# Patient Record
Sex: FEMALE | Race: WHITE | Employment: FULL TIME | URBAN - METROPOLITAN AREA
[De-identification: names, ages, dates, MRNs, and addresses within clinical notes are randomized per-mention and may not be internally consistent; named-entity substitution may affect disease eponyms.]

---

## 2017-02-23 ENCOUNTER — TELEPHONE (OUTPATIENT)
Dept: INTERNAL MEDICINE CLINIC | Facility: CLINIC | Age: 42
End: 2017-02-23

## 2017-02-24 NOTE — TELEPHONE ENCOUNTER
Pt states that she is going to be traveling this year to [facility] and [facility] and she has noticed that every time she has traveled in the past her feet get very swollen to the point where she cant put on her shoes.  Pt is asking if there is something she can t

## 2017-02-28 ENCOUNTER — OFFICE VISIT (OUTPATIENT)
Dept: INTERNAL MEDICINE CLINIC | Facility: CLINIC | Age: 42
End: 2017-02-28

## 2017-02-28 VITALS
SYSTOLIC BLOOD PRESSURE: 103 MMHG | DIASTOLIC BLOOD PRESSURE: 69 MMHG | BODY MASS INDEX: 35.55 KG/M2 | TEMPERATURE: 99 F | HEIGHT: 69 IN | HEART RATE: 75 BPM | WEIGHT: 240 LBS

## 2017-02-28 DIAGNOSIS — Z71.85 IMMUNIZATION COUNSELING: Primary | ICD-10-CM

## 2017-02-28 PROCEDURE — 99213 OFFICE O/P EST LOW 20 MIN: CPT | Performed by: INTERNAL MEDICINE

## 2017-02-28 PROCEDURE — 99212 OFFICE O/P EST SF 10 MIN: CPT | Performed by: INTERNAL MEDICINE

## 2017-02-28 NOTE — PROGRESS NOTES
HPI:    Patient ID: Lakeshia Alvarez is a 36year old female. HPI She is here today , has question regarding leg swelling . She is traveling to [place] and [place] , but she has question regarding leg swelling . According to her lately when ever she travels (ZITHROMAX) 250 MG Oral Tab Take two tablets by mouth today, then one daily. Take with food and eat some yogurt after Disp: 6 tablet Rfl: 0   Montelukast Sodium (SINGULAIR) 10 MG Oral Tab Take 1 tablet (10 mg total) by mouth nightly.  Disp: 30 tablet Rfl: 2 Eyes: Conjunctivae and EOM are normal. Pupils are equal, round, and reactive to light. Right eye exhibits no discharge. Left eye exhibits no discharge. No scleral icterus. Neck: Normal range of motion. Neck supple. No JVD present.  No tracheal tendernes Referrals:  None        [id number]

## 2017-02-28 NOTE — PATIENT INSTRUCTIONS
immunization counseling  (primary encounter diagnosis) will refer to occupational health for travel vaccination  Leg swelling during flight- advised to walk every one hour, in seat exercise to help contraction of calf muscles , elastic stocking can help to

## 2017-04-18 ENCOUNTER — OFFICE VISIT (OUTPATIENT)
Dept: INTERNAL MEDICINE CLINIC | Facility: CLINIC | Age: 42
End: 2017-04-18

## 2017-04-18 ENCOUNTER — TELEPHONE (OUTPATIENT)
Dept: INTERNAL MEDICINE CLINIC | Facility: CLINIC | Age: 42
End: 2017-04-18

## 2017-04-18 VITALS
TEMPERATURE: 99 F | DIASTOLIC BLOOD PRESSURE: 60 MMHG | HEIGHT: 69 IN | OXYGEN SATURATION: 94 % | WEIGHT: 243.81 LBS | HEART RATE: 80 BPM | SYSTOLIC BLOOD PRESSURE: 92 MMHG | BODY MASS INDEX: 36.11 KG/M2

## 2017-04-18 DIAGNOSIS — R63.5 WEIGHT GAIN: ICD-10-CM

## 2017-04-18 DIAGNOSIS — Z12.39 BREAST CANCER SCREENING: ICD-10-CM

## 2017-04-18 DIAGNOSIS — H53.8 BLURRING OF VISION: ICD-10-CM

## 2017-04-18 DIAGNOSIS — Z23 NEED FOR VACCINATION: ICD-10-CM

## 2017-04-18 DIAGNOSIS — R82.90 ABNORMAL URINE: ICD-10-CM

## 2017-04-18 DIAGNOSIS — R21 RASH: ICD-10-CM

## 2017-04-18 DIAGNOSIS — Z00.00 ROUTINE MEDICAL EXAM: Primary | ICD-10-CM

## 2017-04-18 PROCEDURE — 90471 IMMUNIZATION ADMIN: CPT | Performed by: INTERNAL MEDICINE

## 2017-04-18 PROCEDURE — 81003 URINALYSIS AUTO W/O SCOPE: CPT | Performed by: INTERNAL MEDICINE

## 2017-04-18 PROCEDURE — 99396 PREV VISIT EST AGE 40-64: CPT | Performed by: INTERNAL MEDICINE

## 2017-04-18 PROCEDURE — 90715 TDAP VACCINE 7 YRS/> IM: CPT | Performed by: INTERNAL MEDICINE

## 2017-04-18 PROCEDURE — 36415 COLL VENOUS BLD VENIPUNCTURE: CPT | Performed by: INTERNAL MEDICINE

## 2017-04-18 RX ORDER — CLOTRIMAZOLE AND BETAMETHASONE DIPROPIONATE 10; .64 MG/G; MG/G
CREAM TOPICAL
Qty: 60 G | Refills: 0 | Status: SHIPPED | OUTPATIENT
Start: 2017-04-18 | End: 2018-07-23

## 2017-04-19 DIAGNOSIS — E78.00 ELEVATED CHOLESTEROL: Primary | ICD-10-CM

## 2017-04-19 RX ORDER — TRIAMTERENE AND HYDROCHLOROTHIAZIDE 37.5; 25 MG/1; MG/1
1 CAPSULE ORAL EVERY MORNING
Qty: 20 CAPSULE | Refills: 0 | Status: SHIPPED | OUTPATIENT
Start: 2017-04-19 | End: 2018-07-23

## 2017-04-19 NOTE — PROGRESS NOTES
Quick Note:    CBC Normal (white blood cells and red blood cells and platelets), except slight anemia. Would add on ferritin, TIBC, iron, b12. To add on. Urine is clear. Lipid (choilesterol) is worse, careful with diet.  Stay away from red meat, shellfish

## 2017-04-19 NOTE — PROGRESS NOTES
HPI:    Patient ID: Trung Cadet is a 36year old female. Rash  This is a new problem. The current episode started 1 to 4 weeks ago (When returned from vacation. Wearing bathing suit and wet skin. Simiilar to fungal in the past. ).  The problem has be CREATSERUM 0.78 12/04/2015   CREATSERUM 0.85 10/24/2014       General Health        Domestic Abuse: No     Depression Screening (PHQ-2/PHQ-9): Over the LAST 2 WEEKS   Little interest or pleasure in doing things (over the last two weeks)?: Not at all    Fee Tuberculosis Screen if high risk No components found for: PPDINDURAT      ALLERGIES:   No Known Allergies    CURRENT MEDICATIONS:     Current Outpatient Prescriptions:  clotrimazole-betamethasone 1-0.05 % External Cream Q12hrs. For 7 days.  Disp: 60 g Rfl: Cardiovascular: Negative for chest pain, palpitations and leg swelling. Gastrointestinal: Negative for nausea, vomiting, abdominal pain, diarrhea, constipation, blood in stool, abdominal distention, anal bleeding, rectal pain and anorexia.    Endocrine: N BP 92/60 mmHg  Pulse 80  Temp(Src) 98.8 °F (37.1 °C) (Tympanic)  Ht 5' 9\" (1.753 m)  Wt 243 lb 12.8 oz (110.587 kg)  BMI 35.99 kg/m2  SpO2 94%  LMP 03/22/2017 (Exact Date)  Breastfeeding? No   Patient's last menstrual period was 03/22/2017 (exact date). Mouth/Throat: Uvula is midline, oropharynx is clear and moist and mucous membranes are normal. Mucous membranes are not pale, not dry and not cyanotic. She does not have dentures. No oral lesions. No trismus in the jaw.  No dental abscesses, uvula swelling Pulmonary/Chest: Effort normal and breath sounds normal. No accessory muscle usage or stridor. No apnea, no tachypnea and no bradypnea. She is not intubated. No respiratory distress. She has no decreased breath sounds. She has no wheezes.  She has no rhonch Skin: Skin is warm and dry. No rash noted. She is not diaphoretic. No erythema. No pallor. Psychiatric: She has a normal mood and affect.  Her speech is normal and behavior is normal. Judgment and thought content normal. Cognition and memory are normal. -     clotrimazole-betamethasone 1-0.05 % External Cream; Q12hrs. For 7 days. The patient indicates understanding of these issues and agrees to the plan.   Diet counseling perfomed  Exercise counseling perfomed    [id number]

## 2017-04-19 NOTE — PATIENT INSTRUCTIONS
ASSESSMENT/PLAN:   Routine medical exam  (primary encounter diagnosis) Check urine. Check blood. Blurring of vision F/U eye MD.     Weight gain Check blood first.     Breast cancer screening Check mammogram. Continue self breast exam every month.      R

## 2017-04-20 DIAGNOSIS — D50.9 IRON DEFICIENCY ANEMIA, UNSPECIFIED IRON DEFICIENCY ANEMIA TYPE: Primary | ICD-10-CM

## 2017-11-03 ENCOUNTER — TELEPHONE (OUTPATIENT)
Dept: OPHTHALMOLOGY | Facility: CLINIC | Age: 42
End: 2017-11-03

## 2017-11-03 NOTE — TELEPHONE ENCOUNTER
Pt. States that she is having blurred vision, which is getting worse and can't focus, so she is requesting to schedule an appt. Sooner then 1st avail. , for NP / EE.

## 2017-11-03 NOTE — TELEPHONE ENCOUNTER
Spoke to pt and states that she has been having blurred vision at near over the past few months; getting worse. Pt does not wear any glasses and has not tried OTC readers year. She is not sure what power to get OTC.  Pt would like to come in for a dilated e

## 2017-11-07 ENCOUNTER — HOSPITAL ENCOUNTER (OUTPATIENT)
Dept: MAMMOGRAPHY | Age: 42
Discharge: HOME | End: 2017-11-07
Attending: INTERNAL MEDICINE
Payer: COMMERCIAL

## 2017-11-07 DIAGNOSIS — Z12.39 BREAST CANCER SCREENING: ICD-10-CM

## 2017-11-07 PROCEDURE — 77067 SCR MAMMO BI INCL CAD: CPT | Performed by: INTERNAL MEDICINE

## 2017-11-16 ENCOUNTER — APPOINTMENT (OUTPATIENT)
Dept: LAB | Facility: HOSPITAL | Age: 42
End: 2017-11-16
Attending: INTERNAL MEDICINE
Payer: COMMERCIAL

## 2017-11-16 ENCOUNTER — OFFICE VISIT (OUTPATIENT)
Dept: OPHTHALMOLOGY | Facility: CLINIC | Age: 42
End: 2017-11-16

## 2017-11-16 DIAGNOSIS — H43.393 FLOATER, VITREOUS, BILATERAL: ICD-10-CM

## 2017-11-16 DIAGNOSIS — Z98.890 HX OF LASIK: ICD-10-CM

## 2017-11-16 DIAGNOSIS — H52.203 MYOPIA OF BOTH EYES WITH ASTIGMATISM AND PRESBYOPIA: ICD-10-CM

## 2017-11-16 DIAGNOSIS — E78.00 ELEVATED CHOLESTEROL: ICD-10-CM

## 2017-11-16 DIAGNOSIS — H52.4 MYOPIA OF BOTH EYES WITH ASTIGMATISM AND PRESBYOPIA: ICD-10-CM

## 2017-11-16 DIAGNOSIS — H02.886 MEIBOMIAN GLAND DYSFUNCTION (MGD) OF BOTH EYES: Primary | ICD-10-CM

## 2017-11-16 DIAGNOSIS — R82.90 ABNORMAL URINE: ICD-10-CM

## 2017-11-16 DIAGNOSIS — H52.13 MYOPIA OF BOTH EYES WITH ASTIGMATISM AND PRESBYOPIA: ICD-10-CM

## 2017-11-16 DIAGNOSIS — D50.9 IRON DEFICIENCY ANEMIA, UNSPECIFIED IRON DEFICIENCY ANEMIA TYPE: ICD-10-CM

## 2017-11-16 DIAGNOSIS — H02.883 MEIBOMIAN GLAND DYSFUNCTION (MGD) OF BOTH EYES: Primary | ICD-10-CM

## 2017-11-16 PROCEDURE — 36415 COLL VENOUS BLD VENIPUNCTURE: CPT

## 2017-11-16 PROCEDURE — 80061 LIPID PANEL: CPT

## 2017-11-16 PROCEDURE — 84466 ASSAY OF TRANSFERRIN: CPT

## 2017-11-16 PROCEDURE — 81001 URINALYSIS AUTO W/SCOPE: CPT

## 2017-11-16 PROCEDURE — 82728 ASSAY OF FERRITIN: CPT

## 2017-11-16 PROCEDURE — 83540 ASSAY OF IRON: CPT

## 2017-11-16 PROCEDURE — 99212 OFFICE O/P EST SF 10 MIN: CPT | Performed by: OPHTHALMOLOGY

## 2017-11-16 PROCEDURE — 92015 DETERMINE REFRACTIVE STATE: CPT | Performed by: OPHTHALMOLOGY

## 2017-11-16 PROCEDURE — 99243 OFF/OP CNSLTJ NEW/EST LOW 30: CPT | Performed by: OPHTHALMOLOGY

## 2017-11-16 NOTE — PATIENT INSTRUCTIONS
Myopia of both eyes with astigmatism and presbyopia  Recommend glasses; patient can try progressives, or she will just try +1.50 over the counter for reading.      Meibomian gland dysfunction (MGD) of both eyes  Patient was instructed to use warm compresses

## 2017-11-16 NOTE — PROGRESS NOTES
Laurie Blind is a 36year old female.     HPI:     HPI     Consult    Additional comments: Consult per Dr. Chasity Reed for blurred vision           Comments   Pt is here for a complete exam.  Pt complains of eyes feeling tired and  blurred vision in both eyes Base Eye Exam     Visual Acuity (Snellen - Linear)       Right Left    Dist sc 20/20 20/20 -1    Near sc 20/20 20/30          Tonometry (Applanation, 8:52 AM)       Right Left    Pressure 15 16          Pupils       Pupils    Right PERRL    Left PERRL gland dysfunction (MGD) of both eyes  Patient was instructed to use warm compresses to the eyelids twice a day everyday.     Instructions for warm compress use:   Patient should place wash compresses on both eyelids for 5 minutes every morning and every nig

## 2017-11-16 NOTE — ASSESSMENT & PLAN NOTE
Recommend glasses; patient can try progressives, or she will just try +1.50 over the counter for reading.

## 2017-11-18 PROBLEM — E78.00 HIGH CHOLESTEROL: Status: ACTIVE | Noted: 2017-11-18

## 2017-11-18 PROBLEM — D50.0 IRON DEFICIENCY ANEMIA DUE TO CHRONIC BLOOD LOSS: Status: ACTIVE | Noted: 2017-11-18

## 2018-03-26 ENCOUNTER — OFFICE VISIT (OUTPATIENT)
Dept: INTERNAL MEDICINE CLINIC | Facility: CLINIC | Age: 43
End: 2018-03-26

## 2018-03-26 VITALS
HEART RATE: 68 BPM | SYSTOLIC BLOOD PRESSURE: 123 MMHG | HEIGHT: 69 IN | BODY MASS INDEX: 34.36 KG/M2 | TEMPERATURE: 98 F | OXYGEN SATURATION: 99 % | WEIGHT: 232 LBS | DIASTOLIC BLOOD PRESSURE: 57 MMHG

## 2018-03-26 DIAGNOSIS — E78.00 HIGH CHOLESTEROL: ICD-10-CM

## 2018-03-26 DIAGNOSIS — N93.8 DUB (DYSFUNCTIONAL UTERINE BLEEDING): ICD-10-CM

## 2018-03-26 DIAGNOSIS — Z12.4 PAP SMEAR FOR CERVICAL CANCER SCREENING: Primary | ICD-10-CM

## 2018-03-26 DIAGNOSIS — D50.0 IRON DEFICIENCY ANEMIA DUE TO CHRONIC BLOOD LOSS: ICD-10-CM

## 2018-03-26 DIAGNOSIS — IMO0002 MASS: ICD-10-CM

## 2018-03-26 DIAGNOSIS — K64.8 OTHER HEMORRHOIDS: ICD-10-CM

## 2018-03-26 PROCEDURE — 99212 OFFICE O/P EST SF 10 MIN: CPT | Performed by: INTERNAL MEDICINE

## 2018-03-26 PROCEDURE — 99396 PREV VISIT EST AGE 40-64: CPT | Performed by: INTERNAL MEDICINE

## 2018-03-26 PROCEDURE — 99214 OFFICE O/P EST MOD 30 MIN: CPT | Performed by: INTERNAL MEDICINE

## 2018-03-26 PROCEDURE — 36415 COLL VENOUS BLD VENIPUNCTURE: CPT | Performed by: INTERNAL MEDICINE

## 2018-03-26 NOTE — PATIENT INSTRUCTIONS
ASSESSMENT/PLAN:   Pap smear for cervical cancer screening  (primary encounter diagnosis) Check pap and HPV. Mass R side nose. Looks benign. F/U dermatology. Dub (dysfunctional uterine bleeding) Monitor cycles. Check pap. Check blood.      High chol

## 2018-03-26 NOTE — PROGRESS NOTES
HPI:    Patient ID: Juan Pablo Chapin is a 39year old female.   Juan Pablo Chapin is a 39year old female who presents for a complete physical exam.   HPI:   Patient presents with:  Menstrual Problem: Menstrual cycle arriving too often       Wt Readings from L NL at 28 y/o. H/O hemoorhoids.        Past Surgical History:  No date: COLONOSCOPY  2012: LASIK Bilateral      Comment: does not remember 's name who did surgery  No date: TONSILLECTOMY   Family History   Problem Relation Age of Onset   • Cancer Materna The patient's primary symptoms include vaginal bleeding. The patient's pertinent negatives include no genital itching, genital lesions, genital odor, genital rash, missed menses, pelvic pain or vaginal discharge.  Primary symptoms comment: Usu. 2-18-18 to 2 Gastrointestinal: Negative for abdominal distention, abdominal pain, anal bleeding, blood in stool, constipation, diarrhea, nausea, rectal pain and vomiting. Anal itching. Especially when spicey foods. Loves spice.     Endocrine: Negative for cold in Family History   Problem Relation Age of Onset   • Cancer Maternal Grandmother    • Glaucoma Maternal Grandmother    • apnea [OTHER] Brother    • Diabetes Neg    • Macular degeneration Neg       Social History: Smoking status: Never Smoker Mouth/Throat: Uvula is midline, oropharynx is clear and moist and mucous membranes are normal. Mucous membranes are not pale, not dry and not cyanotic. She does not have dentures. No oral lesions. No trismus in the jaw.  No dental abscesses, uvula swelling Pulmonary/Chest: Effort normal and breath sounds normal. No accessory muscle usage or stridor. No apnea, no tachypnea and no bradypnea. No respiratory distress. She has no decreased breath sounds. She has no wheezes. She has no rhonchi. She has no rales.  Prasad Prophet She has no cervical adenopathy. Right cervical: No superficial cervical, no deep cervical and no posterior cervical adenopathy present. Left cervical: No superficial cervical, no deep cervical and no posterior cervical adenopathy present.

## 2018-04-02 NOTE — PROGRESS NOTES
CBC Normal (white blood cells and platelets), but red amount of red blood cells has declined and iron storage is very low. May be just from heavy cycles.   Would recommend iron 325 mg twice a day with vitamin C 500 IUs twice a day to help absorb iron and r

## 2018-04-05 ENCOUNTER — TELEPHONE (OUTPATIENT)
Dept: INTERNAL MEDICINE CLINIC | Facility: CLINIC | Age: 43
End: 2018-04-05

## 2018-04-05 NOTE — TELEPHONE ENCOUNTER
Patient would like to see you tomorrow she has a severe cough,cold and fever only is requesting to see you

## 2018-04-06 ENCOUNTER — OFFICE VISIT (OUTPATIENT)
Dept: INTERNAL MEDICINE CLINIC | Facility: CLINIC | Age: 43
End: 2018-04-06

## 2018-04-06 VITALS
OXYGEN SATURATION: 99 % | HEIGHT: 69 IN | WEIGHT: 229 LBS | HEART RATE: 80 BPM | TEMPERATURE: 98 F | BODY MASS INDEX: 33.92 KG/M2 | DIASTOLIC BLOOD PRESSURE: 56 MMHG | SYSTOLIC BLOOD PRESSURE: 90 MMHG

## 2018-04-06 DIAGNOSIS — R05.9 COUGH: ICD-10-CM

## 2018-04-06 DIAGNOSIS — D50.0 IRON DEFICIENCY ANEMIA DUE TO CHRONIC BLOOD LOSS: ICD-10-CM

## 2018-04-06 DIAGNOSIS — E78.00 HIGH CHOLESTEROL: Primary | ICD-10-CM

## 2018-04-06 PROCEDURE — 99214 OFFICE O/P EST MOD 30 MIN: CPT | Performed by: INTERNAL MEDICINE

## 2018-04-06 PROCEDURE — 99212 OFFICE O/P EST SF 10 MIN: CPT | Performed by: INTERNAL MEDICINE

## 2018-04-06 RX ORDER — AZITHROMYCIN 250 MG/1
TABLET, FILM COATED ORAL
Qty: 6 TABLET | Refills: 0 | Status: SHIPPED | OUTPATIENT
Start: 2018-04-06 | End: 2018-05-09 | Stop reason: ALTCHOICE

## 2018-04-06 RX ORDER — IPRATROPIUM BROMIDE AND ALBUTEROL SULFATE 2.5; .5 MG/3ML; MG/3ML
3 SOLUTION RESPIRATORY (INHALATION) ONCE
Status: SHIPPED | OUTPATIENT
Start: 2018-04-06

## 2018-04-06 RX ORDER — ALBUTEROL SULFATE 90 UG/1
2 AEROSOL, METERED RESPIRATORY (INHALATION) EVERY 4 HOURS PRN
Qty: 1 INHALER | Refills: 0 | Status: SHIPPED | OUTPATIENT
Start: 2018-04-06 | End: 2020-08-04

## 2018-04-06 RX ORDER — MONTELUKAST SODIUM 10 MG/1
10 TABLET ORAL NIGHTLY
Qty: 7 TABLET | Refills: 0 | Status: SHIPPED | OUTPATIENT
Start: 2018-04-06 | End: 2018-05-09

## 2018-04-06 NOTE — PATIENT INSTRUCTIONS
ASSESSMENT/PLAN:   High cholesterol  (primary encounter diagnosis) Doing good 3-18. Iron deficiency anemia due to chronic blood loss Check in 3 months. Check FOBT. F/U gyne. Dr. Julian Freeman.      CoughStart antibiotics ASAP and take as directed with food til

## 2018-04-06 NOTE — PROGRESS NOTES
HPI:    Patient ID: Opal Duenas is a 39year old female. URI    This is a new problem. The current episode started in the past 7 days. The problem has been gradually worsening.  The maximum temperature recorded prior to her arrival was 100.4 - 100.9 Eyes: Negative for photophobia, pain, discharge, redness, itching and visual disturbance. Respiratory: Positive for wheezing. Negative for apnea, cough, choking, chest tightness, shortness of breath and stridor.     Cardiovascular: Negative for chest p History:  No date: COLONOSCOPY  2012: LASIK Bilateral      Comment: does not remember 's name who did surgery  No date: TONSILLECTOMY   Family History   Problem Relation Age of Onset   • Cancer Maternal Grandmother    • Glaucoma Maternal Grandmother membranes are normal. Mucous membranes are not pale, not dry and not cyanotic. She does not have dentures. No oral lesions. No trismus in the jaw. No dental abscesses, uvula swelling or lacerations.  No oropharyngeal exudate, posterior oropharyngeal edema, to person, place, and time. Skin: Skin is warm and dry. She is not diaphoretic. Psychiatric: She has a normal mood and affect. Her behavior is normal.   Nursing note and vitals reviewed.            ASSESSMENT/PLAN:   High cholesterol  (primary encounter

## 2018-05-09 ENCOUNTER — OFFICE VISIT (OUTPATIENT)
Dept: OBGYN CLINIC | Facility: CLINIC | Age: 43
End: 2018-05-09

## 2018-05-09 VITALS
DIASTOLIC BLOOD PRESSURE: 54 MMHG | BODY MASS INDEX: 33 KG/M2 | SYSTOLIC BLOOD PRESSURE: 106 MMHG | HEART RATE: 86 BPM | WEIGHT: 225 LBS

## 2018-05-09 DIAGNOSIS — Z32.00 PREGNANCY EXAMINATION OR TEST, PREGNANCY UNCONFIRMED: ICD-10-CM

## 2018-05-09 DIAGNOSIS — N92.0 EXCESSIVE OR FREQUENT MENSTRUATION: Primary | ICD-10-CM

## 2018-05-09 PROCEDURE — 99214 OFFICE O/P EST MOD 30 MIN: CPT | Performed by: OBSTETRICS & GYNECOLOGY

## 2018-05-09 PROCEDURE — 81025 URINE PREGNANCY TEST: CPT | Performed by: OBSTETRICS & GYNECOLOGY

## 2018-05-09 NOTE — H&P
HPI:  The patient is a 76-year-old female who presents complaining of frequent menses. Patient states that since February she has been getting her period every 15 days. She states that she used to have regular monthly cycles.   However she states now sh HISTORY:  Family History   Problem Relation Age of Onset   • Cancer Maternal Grandmother    • Glaucoma Maternal Grandmother    • apnea [OTHER] Brother    • Diabetes Neg    • Macular degeneration Neg        MEDICATIONS:    Current Outpatient Prescriptions: PELVIS (TRANSVAGINAL PELVIS) (CPT=76830); Future  -     CHLAMYDIA/GONOCOCCUS, ATUL; Future  -     TRICH VAG BY ATUL;  Future  -     TRICH VAG BY ATUL  -     CHLAMYDIA/GONOCOCCUS, ATUL    Pregnancy examination or test, pregnancy unconfirmed  -     URINE PREGNANC

## 2018-07-23 ENCOUNTER — OFFICE VISIT (OUTPATIENT)
Dept: OBGYN CLINIC | Facility: CLINIC | Age: 43
End: 2018-07-23
Payer: COMMERCIAL

## 2018-07-23 VITALS
SYSTOLIC BLOOD PRESSURE: 113 MMHG | HEART RATE: 72 BPM | BODY MASS INDEX: 34 KG/M2 | WEIGHT: 227.19 LBS | DIASTOLIC BLOOD PRESSURE: 61 MMHG

## 2018-07-23 DIAGNOSIS — N92.0 MENORRHAGIA WITH REGULAR CYCLE: Primary | ICD-10-CM

## 2018-07-23 DIAGNOSIS — Z30.09 BIRTH CONTROL COUNSELING: ICD-10-CM

## 2018-07-23 PROCEDURE — 99213 OFFICE O/P EST LOW 20 MIN: CPT | Performed by: OBSTETRICS & GYNECOLOGY

## 2018-08-02 NOTE — H&P
HPI:  The patient is a 41-year-old female who presents today to discuss contraception. Patient's interested in birth control options to prevent pregnancy. I saw the patient last in May was complaining of excessive and frequent menstruation.   The plan at degeneration Neg        MEDICATIONS:    Current Outpatient Prescriptions:   •  Albuterol Sulfate HFA (PROAIR HFA) 108 (90 Base) MCG/ACT Inhalation Aero Soln, Inhale 2 puffs into the lungs every 4 (four) hours as needed for Wheezing or Shortness of Breath. ,

## 2018-08-08 ENCOUNTER — HOSPITAL ENCOUNTER (OUTPATIENT)
Dept: ULTRASOUND IMAGING | Facility: HOSPITAL | Age: 43
Discharge: HOME | End: 2018-08-08
Attending: OBSTETRICS & GYNECOLOGY
Payer: COMMERCIAL

## 2018-08-08 DIAGNOSIS — N92.0 EXCESSIVE OR FREQUENT MENSTRUATION: ICD-10-CM

## 2018-08-08 PROCEDURE — 76830 TRANSVAGINAL US NON-OB: CPT | Performed by: OBSTETRICS & GYNECOLOGY

## 2018-08-08 PROCEDURE — 76856 US EXAM PELVIC COMPLETE: CPT | Performed by: OBSTETRICS & GYNECOLOGY

## 2018-08-16 ENCOUNTER — TELEPHONE (OUTPATIENT)
Dept: OBGYN CLINIC | Facility: CLINIC | Age: 43
End: 2018-08-16

## 2018-08-16 DIAGNOSIS — Z32.00 PREGNANCY EXAMINATION OR TEST, PREGNANCY UNCONFIRMED: Primary | ICD-10-CM

## 2018-08-16 RX ORDER — MISOPROSTOL 200 UG/1
TABLET ORAL
Qty: 1 TABLET | Refills: 0 | Status: SHIPPED | OUTPATIENT
Start: 2018-08-16 | End: 2018-08-16

## 2018-08-16 RX ORDER — MISOPROSTOL 200 UG/1
TABLET ORAL
Qty: 1 TABLET | Refills: 0 | Status: SHIPPED | OUTPATIENT
Start: 2018-08-16 | End: 2019-05-01 | Stop reason: ALTCHOICE

## 2018-08-16 NOTE — TELEPHONE ENCOUNTER
PT NOTIFIED OF RESULTS AND RECS. SHE WILL COME TO [facility] FOR PREG TEST THE DAY BEFORE. PHARMACY CONFIRMED AND RX SENT. SUGGESTED TAKING 2-3 IBUPROFEN ABOUT 30-60 MINUTES PRIOR TO APPT. PT VERBALIZED UNDERSTANDING. ORDER PLACED AND RX SENT.

## 2018-08-16 NOTE — TELEPHONE ENCOUNTER
----- Message from Inna Lobo DO sent at 8/15/2018  1:14 PM CDT -----  Pelvic US normal.  Help schedule emb. Need cytotec the night before and serum HCG the day before as well.

## 2018-08-27 ENCOUNTER — TELEPHONE (OUTPATIENT)
Dept: OBGYN CLINIC | Facility: CLINIC | Age: 43
End: 2018-08-27

## 2018-08-27 NOTE — TELEPHONE ENCOUNTER
PT STATES PERIOD BEGAN A COUPLE DAYS EARLY, STARTED 8-23. HAS EMBX SCHEDULED FRI, 8-31. REASSURED PT THIS IS STILL A GOOD APPT SINCE SHE WILL BE DONE BLEEDING. REMINDED TO TAKE CYTOTEC AT BEDTIME THE NIGHT BEFORE AND DO SERUM PREG TEST THUR ANYTIME.   PT

## 2018-08-30 ENCOUNTER — APPOINTMENT (OUTPATIENT)
Dept: LAB | Facility: HOSPITAL | Age: 43
End: 2018-08-30
Attending: OBSTETRICS & GYNECOLOGY
Payer: COMMERCIAL

## 2018-08-30 DIAGNOSIS — Z32.00 PREGNANCY EXAMINATION OR TEST, PREGNANCY UNCONFIRMED: ICD-10-CM

## 2018-08-30 LAB — HCG SERPL QL: NEGATIVE

## 2018-08-30 PROCEDURE — 84703 CHORIONIC GONADOTROPIN ASSAY: CPT

## 2018-08-30 PROCEDURE — 36415 COLL VENOUS BLD VENIPUNCTURE: CPT

## 2018-08-31 ENCOUNTER — OFFICE VISIT (OUTPATIENT)
Dept: OBGYN CLINIC | Facility: CLINIC | Age: 43
End: 2018-08-31
Payer: COMMERCIAL

## 2018-08-31 VITALS
WEIGHT: 226.81 LBS | BODY MASS INDEX: 33 KG/M2 | DIASTOLIC BLOOD PRESSURE: 66 MMHG | HEART RATE: 84 BPM | SYSTOLIC BLOOD PRESSURE: 102 MMHG

## 2018-08-31 DIAGNOSIS — N92.0 MENORRHAGIA WITH REGULAR CYCLE: Primary | ICD-10-CM

## 2018-08-31 PROCEDURE — 58100 BIOPSY OF UTERUS LINING: CPT | Performed by: OBSTETRICS & GYNECOLOGY

## 2018-09-05 ENCOUNTER — TELEPHONE (OUTPATIENT)
Dept: OBGYN CLINIC | Facility: CLINIC | Age: 43
End: 2018-09-05

## 2018-09-05 NOTE — TELEPHONE ENCOUNTER
----- Message from Kim Collins DO sent at 9/5/2018  7:50 AM CDT -----  Biopsy negative. Pls notify. She needs to follow up for [facility] counseling.

## 2018-09-06 NOTE — TELEPHONE ENCOUNTER
Informed pt that Saranya Galindo stated biopsy negative. Informed pt that she needs to follow up for [facility] counseling. Pt made an appt to see Saranya Galindo.

## 2018-09-10 ENCOUNTER — OFFICE VISIT (OUTPATIENT)
Dept: OBGYN CLINIC | Facility: CLINIC | Age: 43
End: 2018-09-10
Payer: COMMERCIAL

## 2018-09-10 VITALS
WEIGHT: 229.19 LBS | SYSTOLIC BLOOD PRESSURE: 105 MMHG | BODY MASS INDEX: 34 KG/M2 | HEART RATE: 78 BPM | DIASTOLIC BLOOD PRESSURE: 68 MMHG

## 2018-09-10 DIAGNOSIS — Z12.31 SCREENING MAMMOGRAM, ENCOUNTER FOR: ICD-10-CM

## 2018-09-10 DIAGNOSIS — Z30.09 BIRTH CONTROL COUNSELING: Primary | ICD-10-CM

## 2018-09-10 PROCEDURE — 99213 OFFICE O/P EST LOW 20 MIN: CPT | Performed by: OBSTETRICS & GYNECOLOGY

## 2018-09-10 RX ORDER — ACETAMINOPHEN AND CODEINE PHOSPHATE 120; 12 MG/5ML; MG/5ML
0.35 SOLUTION ORAL DAILY
Qty: 3 PACKAGE | Refills: 0 | Status: SHIPPED | OUTPATIENT
Start: 2018-09-10 | End: 2018-10-08

## 2018-09-17 NOTE — H&P
HPI:  The patient is a 38 yo f who presents to [facility] for contraception and regulation of heavy menses. We rececently performed an EMB that was wnl. She has had a normal pelvic exam and US. She is sexually active with 1 parnter.   LPS: 3/25/18pap/hpv (apnea) Brother    • Diabetes Neg    • Macular degeneration Neg        MEDICATIONS:    Current Outpatient Medications:   •  Norethindrone (NOR-QD) 0.35 MG Oral Tab, Take 1 tablet (0.35 mg total) by mouth daily for 28 days. , Disp: 3 Package, Rfl: 0  •  reena minipill. Rx sent. To start with next cycle. Needs update mammo 1 yeare form last and f/u in 3 mo to discuss satisfaction and menses. Enforced importance of daily use, condoms during first month, and risks associated with hormones.   All questions answe

## 2018-11-05 ENCOUNTER — TELEPHONE (OUTPATIENT)
Dept: OBGYN CLINIC | Facility: CLINIC | Age: 43
End: 2018-11-05

## 2018-11-05 NOTE — TELEPHONE ENCOUNTER
C/O HAD 5 DAYS OF BLEEDING AND THEN 3 DAYS OF SPOTTING LAST PILL PACK. SAME THING HAPPENED THIS PACK BUT STARTED BACK UP WITH BRIGHT RED BLOOD YESTERDAY INSTEAD OF BROWN.   LOOKS LIKE SHE IS GOING TO GET A REGULAR BLEED AGAIN, LESS THAN 2 WEEKS FROM LAST P

## 2018-12-03 ENCOUNTER — OFFICE VISIT (OUTPATIENT)
Dept: OBGYN CLINIC | Facility: CLINIC | Age: 43
End: 2018-12-03
Payer: COMMERCIAL

## 2018-12-03 VITALS — DIASTOLIC BLOOD PRESSURE: 65 MMHG | SYSTOLIC BLOOD PRESSURE: 104 MMHG | HEART RATE: 80 BPM

## 2018-12-03 DIAGNOSIS — N92.0 MENORRHAGIA WITH REGULAR CYCLE: Primary | ICD-10-CM

## 2018-12-03 PROCEDURE — 99213 OFFICE O/P EST LOW 20 MIN: CPT | Performed by: OBSTETRICS & GYNECOLOGY

## 2018-12-03 RX ORDER — ACETAMINOPHEN AND CODEINE PHOSPHATE 120; 12 MG/5ML; MG/5ML
0.35 SOLUTION ORAL DAILY
Qty: 1 PACKAGE | Refills: 0 | Status: SHIPPED | OUTPATIENT
Start: 2018-12-03 | End: 2018-12-31

## 2018-12-08 NOTE — H&P
HPI:  The patient is a 38 yo F here to discuss satisfaction of minipill for menorrhagia treatment. Patient states that her first 2 cycles were heavy. She states that they were 18 and 24-day cycles with 5-10 days of bleeding.   She states that she is curre Social History Narrative      Not on file      FAMILY HISTORY:  Family History   Problem Relation Age of Onset   • Cancer Maternal Grandmother    • Glaucoma Maternal Grandmother    • Other (apnea) Brother    • Diabetes Neg    • Macular degeneration Neg continuing OCPs, Depakote, Nexplanon, and Mirena IUD. Risks and benefits of each approach were reviewed. After discussion patient wants to see how the rest of the cycle goes and continue one more month of minipill.   We did discuss that it can take 3-6 cy

## 2018-12-26 ENCOUNTER — TELEPHONE (OUTPATIENT)
Dept: OBGYN CLINIC | Facility: CLINIC | Age: 43
End: 2018-12-26

## 2018-12-26 RX ORDER — MISOPROSTOL 200 UG/1
200 TABLET ORAL ONCE
Qty: 1 TABLET | Refills: 0 | Status: SHIPPED | OUTPATIENT
Start: 2018-12-26 | End: 2018-12-26

## 2018-12-26 NOTE — TELEPHONE ENCOUNTER
Per JOSEPHINE's notes, pt to call for Mirena insertion. Pt is to take cytotec the night before and to a ucg in the office at the appt. Appt made on 12/28. Pharmacy verified. Rx for cytotec 200mcg sent to pharmacy.

## 2018-12-28 ENCOUNTER — TELEPHONE (OUTPATIENT)
Dept: OBGYN CLINIC | Facility: CLINIC | Age: 43
End: 2018-12-28

## 2018-12-28 RX ORDER — ACETAMINOPHEN AND CODEINE PHOSPHATE 120; 12 MG/5ML; MG/5ML
SOLUTION ORAL
Qty: 3 PACKAGE | Refills: 0 | Status: SHIPPED | OUTPATIENT
Start: 2018-12-28 | End: 2019-02-13

## 2018-12-28 NOTE — TELEPHONE ENCOUNTER
Pt was past 15 min tai and vicky could not see pt for iud insert. Pt was extremely upset and keeps insisting she needs a 90 day supply for bc then. Will not take no for an answer.

## 2018-12-28 NOTE — TELEPHONE ENCOUNTER
Okay to refill 3 month supply and she can reschedule IUD insertion if she wishes. She will need annual in February.

## 2018-12-28 NOTE — TELEPHONE ENCOUNTER
Informed pt that Saranya [address] stated okay to refill for 3 month supply and she can reschedule her IUD insertion if she wishes. Informed pt that she needs Annual in Feb.      Pt wants the rx for ocps sent. Pt will call back to reschedule IUD.   She is aware of the

## 2019-02-04 ENCOUNTER — TELEPHONE (OUTPATIENT)
Dept: OBGYN CLINIC | Facility: CLINIC | Age: 44
End: 2019-02-04

## 2019-02-04 DIAGNOSIS — Z32.00 PREGNANCY EXAMINATION OR TEST, PREGNANCY UNCONFIRMED: Primary | ICD-10-CM

## 2019-02-04 NOTE — TELEPHONE ENCOUNTER
Pt states she has been seeing [address] for this issue for some time now. Pt reports starting period on 1/21/19 and that period \"felt different and I had a lot of cramping\".  Pt reports light bleeding for the first 3 days and then heavy bleeding for the next 5

## 2019-02-06 RX ORDER — MISOPROSTOL 200 UG/1
200 TABLET ORAL ONCE
Qty: 1 TABLET | Refills: 0 | Status: SHIPPED | OUTPATIENT
Start: 2019-02-06 | End: 2019-02-06

## 2019-02-06 NOTE — TELEPHONE ENCOUNTER
She needs to be aware she can have irregular bleeding with 3-6 months with any form of BC she starts, no different than with the IUD. I can't ensure the IUD will work right away.   Since on progesterone, okay for serum HCG and placement

## 2019-02-06 NOTE — TELEPHONE ENCOUNTER
Pt informed of JOSEPHINEs recs. Pt stated \"as long as this will work, I am fine with the IUD\". Pt stated she has been bleeding since 1/21 and has been taking her pills at the same time every day and not missing any pills.  Message to JOSEPHINE--ok for pt to schedule

## 2019-02-06 NOTE — TELEPHONE ENCOUNTER
Pt informed of below. Pt stated she will go to hcg qual the day before IUD insertion after work. Order placed. Spoke with JOSEPHINE and verbal for pt to take 200mcg cytotec the night prior to insertion. RX sent.  Pt also advised to take 600mg ibuprofen 30-60 min

## 2019-02-06 NOTE — TELEPHONE ENCOUNTER
Pt informed of MAZs recs below and verbalized understanding. Pt sttaed that it is hard for her to get to [place] to do her labs and then come back the next day for the IUD insertion because she works in the city.  Pt asking if she can have blood preg test

## 2019-02-12 ENCOUNTER — APPOINTMENT (OUTPATIENT)
Dept: LAB | Facility: HOSPITAL | Age: 44
End: 2019-02-12
Attending: OBSTETRICS & GYNECOLOGY
Payer: COMMERCIAL

## 2019-02-12 ENCOUNTER — TELEPHONE (OUTPATIENT)
Dept: OBGYN CLINIC | Facility: CLINIC | Age: 44
End: 2019-02-12

## 2019-02-12 DIAGNOSIS — Z32.00 PREGNANCY EXAMINATION OR TEST, PREGNANCY UNCONFIRMED: ICD-10-CM

## 2019-02-12 LAB — HCG SERPL QL: NEGATIVE

## 2019-02-12 PROCEDURE — 84703 CHORIONIC GONADOTROPIN ASSAY: CPT

## 2019-02-12 PROCEDURE — 36415 COLL VENOUS BLD VENIPUNCTURE: CPT

## 2019-02-12 NOTE — TELEPHONE ENCOUNTER
Left message for pt to call back: needs to be reminded to get HCG done tonight in order for IUD insertion to be done tomorrow.

## 2019-02-13 ENCOUNTER — OFFICE VISIT (OUTPATIENT)
Dept: OBGYN CLINIC | Facility: CLINIC | Age: 44
End: 2019-02-13
Payer: COMMERCIAL

## 2019-02-13 ENCOUNTER — HOSPITAL ENCOUNTER (OUTPATIENT)
Dept: MAMMOGRAPHY | Age: 44
Discharge: HOME | End: 2019-02-13
Attending: OBSTETRICS & GYNECOLOGY
Payer: COMMERCIAL

## 2019-02-13 VITALS
DIASTOLIC BLOOD PRESSURE: 75 MMHG | WEIGHT: 227 LBS | SYSTOLIC BLOOD PRESSURE: 118 MMHG | HEART RATE: 85 BPM | BODY MASS INDEX: 34 KG/M2

## 2019-02-13 DIAGNOSIS — Z12.31 SCREENING MAMMOGRAM, ENCOUNTER FOR: ICD-10-CM

## 2019-02-13 DIAGNOSIS — Z30.430 ENCOUNTER FOR IUD INSERTION: Primary | ICD-10-CM

## 2019-02-13 PROCEDURE — 77067 SCR MAMMO BI INCL CAD: CPT | Performed by: OBSTETRICS & GYNECOLOGY

## 2019-02-13 PROCEDURE — 77063 BREAST TOMOSYNTHESIS BI: CPT | Performed by: OBSTETRICS & GYNECOLOGY

## 2019-02-13 PROCEDURE — 58300 INSERT INTRAUTERINE DEVICE: CPT | Performed by: OBSTETRICS & GYNECOLOGY

## 2019-02-14 NOTE — PROCEDURES
IUD Insertion     Pregnancy Results: negative from blood test   Birth control method(s) used: NorQD   LMP: 1/21/19  Consent signed. Procedure discussed with the patient in detail including indication, risks, benefits, alternatives and complications.     Pe

## 2019-02-28 ENCOUNTER — HOSPITAL ENCOUNTER (OUTPATIENT)
Dept: ULTRASOUND IMAGING | Facility: HOSPITAL | Age: 44
Discharge: HOME | End: 2019-02-28
Attending: OBSTETRICS & GYNECOLOGY
Payer: COMMERCIAL

## 2019-02-28 ENCOUNTER — HOSPITAL ENCOUNTER (OUTPATIENT)
Dept: MAMMOGRAPHY | Facility: HOSPITAL | Age: 44
Discharge: HOME | End: 2019-02-28
Attending: OBSTETRICS & GYNECOLOGY
Payer: COMMERCIAL

## 2019-02-28 DIAGNOSIS — R92.8 ABNORMAL MAMMOGRAM: ICD-10-CM

## 2019-02-28 PROCEDURE — 77061 BREAST TOMOSYNTHESIS UNI: CPT | Performed by: OBSTETRICS & GYNECOLOGY

## 2019-02-28 PROCEDURE — 76642 ULTRASOUND BREAST LIMITED: CPT | Performed by: OBSTETRICS & GYNECOLOGY

## 2019-02-28 PROCEDURE — 77065 DX MAMMO INCL CAD UNI: CPT | Performed by: OBSTETRICS & GYNECOLOGY

## 2019-02-28 NOTE — IMAGING NOTE
This nurse introduced self and role of breast coordinator. Discussed recommended breast biopsy with patient. Mrs. Heydi Rivas was recommended by Dr. Hopper Born  to have a Left Breast ultrasound guided biopsy.    Pt history discussed as below:  INDICATION DIAGNOSTIC CATEGORY 4--SUSPICIOUS       RECOMMENDATIONS:   ULTRASOUND-GUIDED CORE BIOPSY: LEFT BREAST      Dictated by (CST): Ochoa Almonte MD on 2/28/2019 at 14:07       Approved by (CST): Ochoa Almonte MD on 2/28/2019 at 17:57            Pt h preference is to hold these medications for  5days prior to biopsy. Mrs. Marylou Riggs denies taking. Reviewed US guided biopsy procedure as below and written instruction provided.   You will be lying on your back, potentially slightly toward one side, for t with comfort after the biopsy and decrease swelling. Reviewed results process with patient and shared that pathology results will be available within 2-3 business days of their biopsy.   Discussed results will be communicated by their ordering physician un

## 2019-03-04 ENCOUNTER — HOSPITAL ENCOUNTER (OUTPATIENT)
Dept: ULTRASOUND IMAGING | Facility: HOSPITAL | Age: 44
Discharge: HOME | End: 2019-03-04
Attending: OBSTETRICS & GYNECOLOGY
Payer: COMMERCIAL

## 2019-03-04 ENCOUNTER — HOSPITAL ENCOUNTER (OUTPATIENT)
Dept: MAMMOGRAPHY | Facility: HOSPITAL | Age: 44
Discharge: HOME | End: 2019-03-04
Attending: OBSTETRICS & GYNECOLOGY
Payer: COMMERCIAL

## 2019-03-04 ENCOUNTER — TELEPHONE (OUTPATIENT)
Dept: OBGYN CLINIC | Facility: CLINIC | Age: 44
End: 2019-03-04

## 2019-03-04 VITALS
HEART RATE: 81 BPM | SYSTOLIC BLOOD PRESSURE: 107 MMHG | WEIGHT: 195 LBS | BODY MASS INDEX: 28.88 KG/M2 | HEIGHT: 69 IN | DIASTOLIC BLOOD PRESSURE: 58 MMHG

## 2019-03-04 DIAGNOSIS — N63.20 LEFT BREAST MASS: ICD-10-CM

## 2019-03-04 DIAGNOSIS — N63.20 BREAST MASS, LEFT: ICD-10-CM

## 2019-03-04 DIAGNOSIS — N63.0 BREAST MASS SEEN ON MAMMOGRAM: Primary | ICD-10-CM

## 2019-03-04 NOTE — TELEPHONE ENCOUNTER
Ting Almendarez from the breast center states pt was in today for US guided bx but Dr Amena Argueta could not visualize area to determine if bx is needed. Dr Amena Argueta is recommending a left breast MRI to be done this week.  Pt is scheduled on Wed 3/6/19 (to hold slot) for M

## 2019-03-04 NOTE — IMAGING NOTE
1124:  Contacted Dr. Zacarias [place] office to request an order for MRI of the left breast.  Per RN Merlyn Del Valle, Dr. Taryn Bruce provided MRI order. Awaiting insurance authorization.   1206: Mrs. Xu Palomo contacted, requested that she contact the Radiology scheduler to com

## 2019-03-04 NOTE — TELEPHONE ENCOUNTER
[facility] breast care coordinator states pt is there now for a biopsy, needs modality of order changed.  Please advise

## 2019-03-04 NOTE — IMAGING NOTE
80: Mrs. Laverne Barbour arrived to ultrasound room #4 . Medications and allergies reviewed. Procedure explained and questions answered to  Ana Laura's satisfaction.     Hx taken: Left Breast Mass    0912:  Order verified and written consent obtained     09

## 2019-03-06 ENCOUNTER — HOSPITAL ENCOUNTER (OUTPATIENT)
Dept: MRI IMAGING | Facility: HOSPITAL | Age: 44
Discharge: HOME | End: 2019-03-06
Attending: OBSTETRICS & GYNECOLOGY
Payer: COMMERCIAL

## 2019-03-06 DIAGNOSIS — N63.0 BREAST MASS SEEN ON MAMMOGRAM: ICD-10-CM

## 2019-03-06 PROCEDURE — 77048 MRI BREAST C-+ W/CAD UNI: CPT | Performed by: OBSTETRICS & GYNECOLOGY

## 2019-03-06 PROCEDURE — A9575 INJ GADOTERATE MEGLUMI 0.1ML: HCPCS | Performed by: OBSTETRICS & GYNECOLOGY

## 2019-03-12 ENCOUNTER — TELEPHONE (OUTPATIENT)
Dept: OBGYN CLINIC | Facility: CLINIC | Age: 44
End: 2019-03-12

## 2019-03-12 DIAGNOSIS — R92.8 ABNORMAL MAMMOGRAM OF LEFT BREAST: Primary | ICD-10-CM

## 2019-03-12 NOTE — IMAGING NOTE
This rn contacted  Dr Mcwilliams office spoke with Eleanor Dumont. Informed Rut Kowalski that following recommendations by Dr García Hahn was being recommended for Monika Capone Stereotactic biopsy under tomosynthesis.  Rut Kowalski was informed that we do not do stereotactic biopsy u

## 2019-03-12 NOTE — TELEPHONE ENCOUNTER
CANDELARIA FROM BREAST CENTER CALLED TO SAY THAT PT NEEDS A STEREOTACTIC EMMIE BREAST BIOPSY BUT [facility]RAC[facility] IS UNABLE TO DO THIS. PT WILL NEED TO HAVE THIS DONE AT [facility].   WE CAN REFER PT TO CANDELARIA [phone] FOR DI

## 2019-03-12 NOTE — PROGRESS NOTES
Prob Benign appearing findings on mammogram but pt needs to have stereotactic biopsy of her left breast- please call pt and make sure that is scheduled, radiology recommends repeat bilateral MRI of breasts in 6 months,please order and call pt

## 2019-03-12 NOTE — TELEPHONE ENCOUNTER
----- Message from Raheem Bey MD sent at 3/11/2019  8:11 PM CDT -----  Prob Benign appearing findings on mammogram but pt needs to have stereotactic biopsy of her left breast- please call pt and make sure that is scheduled, radiology recommends repeat

## 2019-03-12 NOTE — TELEPHONE ENCOUNTER
PT NOTIFIED OF RESULT AND RECS AND VERBALIZED UNDERSTANDING. PT WILL CALL CANDELARIA SHEPHERD TO MAKE ARRANGEMENTS FOR OUTSIDE BIOPSY.

## 2019-03-13 ENCOUNTER — NURSE NAVIGATOR ENCOUNTER (OUTPATIENT)
Dept: MAMMOGRAPHY | Facility: HOSPITAL | Age: 44
End: 2019-03-13

## 2019-03-13 ENCOUNTER — TELEPHONE (OUTPATIENT)
Dept: MAMMOGRAPHY | Facility: HOSPITAL | Age: 44
End: 2019-03-13

## 2019-03-13 NOTE — TELEPHONE ENCOUNTER
RECEIVED CALL FROM CANDELARIA AT BREAST CENTER. THEY JUST FOUND OUT 2 HOURS AGO THAT [facility] WILL HAVE A EMMIE MACHINE TO DO TOMOSYNTHESIS BIOPSIES AS OF 4-2-19. CANDELARIA ALREADY SPOKE WITH THE PT AND SHE WANTS TO WAIT AND HAVE BIOPSY DONE AT [facility]. ORDER PLACED.   DON

## 2019-03-13 NOTE — IMAGING NOTE
This rn [facility] regarding need for stereotactic bx with ernie needed. We were  Just informed today that we have  a start date April 2 when we are having a new stereotactic tomosynthesis  machine placed at [facility].  Spoke with Dr Daria Mason OF BREAST CANCER.   A CLINICALLY SUSPICIOUS PALPABLE LUMP SHOULD BE BIOPSI     Dictated by (CST): Joshua Castanon MD on 3/07/2019 at 14:15       Approved by (CST): Joshua Castanon MD on 3/07/2019 at 15:58              Pt history discussed as below:  P located upon future breast imaging. After the clip is placed, the  Technologist  will place a dressing on the biopsy site. Additional mammography films will then be taken to assure correct placement of the marker.     Educated the patient they will be som

## 2019-03-13 NOTE — TELEPHONE ENCOUNTER
Attempted to reach Monika Capone to assist with information regarding obtaining stereotactic biopsy under ernie at [facility] or [facility] our facilty does not have this machine at this time.  Message left to return call for assistance Karla hobson

## 2019-04-08 ENCOUNTER — TELEPHONE (OUTPATIENT)
Dept: OBGYN CLINIC | Facility: CLINIC | Age: 44
End: 2019-04-08

## 2019-04-08 NOTE — TELEPHONE ENCOUNTER
Informed pt to keep appt for 4/18/19. Bleeding precautions reviewed with pt again. Pt verbalized understanding.

## 2019-04-08 NOTE — TELEPHONE ENCOUNTER
Pt had Mirena inserted on 2/13/19. Pt states she had spotting that started 1 week after having IUD inserted. Pt states the spotting lasted for 1 month. Pt states around 3/27/19 the bleeding started again with cramping.  Pt states the bleeding stopped about

## 2019-04-09 ENCOUNTER — HOSPITAL ENCOUNTER (OUTPATIENT)
Dept: MAMMOGRAPHY | Facility: HOSPITAL | Age: 44
Discharge: HOME | End: 2019-04-09
Attending: OBSTETRICS & GYNECOLOGY
Payer: COMMERCIAL

## 2019-04-16 ENCOUNTER — HOSPITAL ENCOUNTER (OUTPATIENT)
Dept: MAMMOGRAPHY | Facility: HOSPITAL | Age: 44
Discharge: HOME | End: 2019-04-16
Attending: OBSTETRICS & GYNECOLOGY
Payer: COMMERCIAL

## 2019-04-18 ENCOUNTER — OFFICE VISIT (OUTPATIENT)
Dept: OBGYN CLINIC | Facility: CLINIC | Age: 44
End: 2019-04-18
Payer: COMMERCIAL

## 2019-04-18 VITALS
DIASTOLIC BLOOD PRESSURE: 72 MMHG | BODY MASS INDEX: 34 KG/M2 | WEIGHT: 228.19 LBS | HEART RATE: 74 BPM | SYSTOLIC BLOOD PRESSURE: 115 MMHG

## 2019-04-18 DIAGNOSIS — Z30.431 IUD CHECK UP: Primary | ICD-10-CM

## 2019-04-18 PROCEDURE — 99213 OFFICE O/P EST LOW 20 MIN: CPT | Performed by: OBSTETRICS & GYNECOLOGY

## 2019-04-19 ENCOUNTER — HOSPITAL ENCOUNTER (OUTPATIENT)
Dept: MAMMOGRAPHY | Facility: HOSPITAL | Age: 44
Discharge: HOME | End: 2019-04-19
Attending: OBSTETRICS & GYNECOLOGY
Payer: COMMERCIAL

## 2019-04-19 VITALS
BODY MASS INDEX: 31.1 KG/M2 | HEIGHT: 69 IN | SYSTOLIC BLOOD PRESSURE: 123 MMHG | DIASTOLIC BLOOD PRESSURE: 52 MMHG | HEART RATE: 76 BPM | WEIGHT: 210 LBS

## 2019-04-19 DIAGNOSIS — R92.8 ABNORMAL MAMMOGRAM OF LEFT BREAST: ICD-10-CM

## 2019-04-19 PROCEDURE — 77065 DX MAMMO INCL CAD UNI: CPT | Performed by: OBSTETRICS & GYNECOLOGY

## 2019-04-19 PROCEDURE — 88342 IMHCHEM/IMCYTCHM 1ST ANTB: CPT | Performed by: OBSTETRICS & GYNECOLOGY

## 2019-04-19 PROCEDURE — 88305 TISSUE EXAM BY PATHOLOGIST: CPT | Performed by: OBSTETRICS & GYNECOLOGY

## 2019-04-19 PROCEDURE — 88360 TUMOR IMMUNOHISTOCHEM/MANUAL: CPT | Performed by: OBSTETRICS & GYNECOLOGY

## 2019-04-19 PROCEDURE — 19081 BX BREAST 1ST LESION STRTCTC: CPT | Performed by: OBSTETRICS & GYNECOLOGY

## 2019-04-19 NOTE — IMAGING NOTE
History taken:  Other abnormal and inconclusive findings on diagnostic imaging of breast.  38year-old female recalled from screening mammogram for questionable area of architectural distortion seen only on the CC view with tomosynthesis.     Dr Tamie Mccray he

## 2019-04-19 NOTE — PROCEDURES
[facility]  Procedure Note    Hugojohn Santororossana Patient Status:  Outpatient    1975 MRN [id number]   Location [address] [facility] Attending Silvia Lombardi MD   Hosp Day # 0 PCP Carlos Sanz.  Charo Hassan MD     Procedure: L

## 2019-04-22 ENCOUNTER — TELEPHONE (OUTPATIENT)
Dept: OBGYN CLINIC | Facility: CLINIC | Age: 44
End: 2019-04-22

## 2019-04-22 PROBLEM — C50.912 INFILTRATING DUCTAL CARCINOMA OF LEFT BREAST (HCC): Status: ACTIVE | Noted: 2019-04-22

## 2019-04-22 NOTE — TELEPHONE ENCOUNTER
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 Message also forwarded to [address] [place] [address]  as fyi please see message below.

## 2019-04-22 NOTE — TELEPHONE ENCOUNTER
Just notified by Dr Martin Leon in path dept that pt has infiltrating ductal breast cancer. Please call breast center to make sure that they are going to notify this patient.   I have not seen her in over 3 years

## 2019-04-22 NOTE — TELEPHONE ENCOUNTER
Message forwarded to [employer] to please call pt with results per CAP. Please refer pt to surgeon Dr Jovanny Meléndez and oncologist Dr Sylvia Conner. Thank you. (Received call from [employer] stating she will need an order to call pt to notify of result. Also needs to know which surgeon and oncologist our doctor is referring pt to.  States Dr Vale Becerra is out of clinic until mid-June.)

## 2019-04-23 ENCOUNTER — TELEPHONE (OUTPATIENT)
Dept: HEMATOLOGY/ONCOLOGY | Facility: HOSPITAL | Age: 44
End: 2019-04-23

## 2019-04-23 NOTE — TELEPHONE ENCOUNTER
Phoned patient s/p left breast tomosynthesis guided biopsy. Patient denies breast complaints at this time.   Shared with patient that Dr. Amber Cid has referred her to the Breast Biopsy Result Clinic to discuss results with the Radiologist.  Discussed with neptali

## 2019-04-23 NOTE — TELEPHONE ENCOUNTER
Phoned patient for scheduled appointment with Breast Biopsy Result Clinic. Patient spoke with Dr. Julianna Santamaria, Radiologist, who provided patient with the malignant pathology results. Patient tearful. Emotional support provided to patient.   Shared with p

## 2019-04-24 ENCOUNTER — OFFICE VISIT (OUTPATIENT)
Dept: SURGERY | Facility: CLINIC | Age: 44
End: 2019-04-24
Payer: COMMERCIAL

## 2019-04-24 ENCOUNTER — TELEPHONE (OUTPATIENT)
Dept: OBGYN CLINIC | Facility: CLINIC | Age: 44
End: 2019-04-24

## 2019-04-24 VITALS — BODY MASS INDEX: 32.58 KG/M2 | HEIGHT: 69 IN | WEIGHT: 220 LBS

## 2019-04-24 DIAGNOSIS — C50.412 CARCINOMA OF UPPER-OUTER QUADRANT OF LEFT BREAST IN FEMALE, ESTROGEN RECEPTOR POSITIVE (HCC): Primary | ICD-10-CM

## 2019-04-24 DIAGNOSIS — Z17.0 CARCINOMA OF UPPER-OUTER QUADRANT OF LEFT BREAST IN FEMALE, ESTROGEN RECEPTOR POSITIVE (HCC): Primary | ICD-10-CM

## 2019-04-24 PROCEDURE — 99244 OFF/OP CNSLTJ NEW/EST MOD 40: CPT | Performed by: SURGERY

## 2019-04-24 PROCEDURE — 99212 OFFICE O/P EST SF 10 MIN: CPT | Performed by: SURGERY

## 2019-04-24 NOTE — TELEPHONE ENCOUNTER
PT IS FOLLOWING UP ON THE IUD INSERTION, PT NEEDS TO REMOVE IUD DUE TO RESULTS OF MAMMOGRAM. PLS CALL PT TO FOLLOW UP AND ADVISE.

## 2019-04-25 ENCOUNTER — TELEPHONE (OUTPATIENT)
Dept: HEMATOLOGY/ONCOLOGY | Facility: HOSPITAL | Age: 44
End: 2019-04-25

## 2019-04-25 ENCOUNTER — TELEPHONE (OUTPATIENT)
Dept: OBGYN CLINIC | Facility: CLINIC | Age: 44
End: 2019-04-25

## 2019-04-25 NOTE — TELEPHONE ENCOUNTER
PT NEWLY DIAGNOSED WITH BREAST CANCER SO WILL NEED IUD REMOVED. PT HAS MULTIPLE APPTS NEXT WEEK ON MON, WED AND THUR. LINA WAS HOPING WE COULD FIND AN APPT WITH JOSEPHINE ON THE DAYS PT WILL BE IN THIS AREA SINCE SHE DRIVES IN FROM THE [facility].

## 2019-04-25 NOTE — TELEPHONE ENCOUNTER
Phoned patient to discuss left breast MRI biopsy recommendation and scheduling. Appointment scheduled for Thursday 5/2/19. Instructed patient to arrive at 6:30am to [facility].    Patient reports taking the following medications and over-the-counter on the biopsy site. Additional mammography films will then be taken to assure correct placement of the marker.     Educated patient to wear no deodorant, lotions, powders, or perfumes on the  day of exam.  Educated the patient they will be awake during thi

## 2019-04-25 NOTE — TELEPHONE ENCOUNTER
CALLED AND SPOKE WITH PT ABOUT POSSIBLE APPTS. SHE DECIDED TO TAKE APPT AT [facility] WITH JOSEPHINE ON 5-1 AT 4:10PM.  ENCOURAGED TO CALL BACK WITH ANY QUESTIONS OR CONCERNS.

## 2019-04-25 NOTE — TELEPHONE ENCOUNTER
Patient returned call. Patient reports her appointment with Dr. Farida Evans went well. Patient is eager to proceed with next steps in her care. Discussed scheduling appointments with Genetic Counselor, Medical Oncology, as well as left breast MRI biopsy.

## 2019-04-26 NOTE — H&P
History and Physical      Tarah Rawls is a 38year old female. HPI   Patient presents with:  Breast Cancer: Pt referred by Dr. Ila Cuba regarding left breast cancer. Pt states she went for annual mammogran, u/s then left breast bx.   Pt states she can Substance and Sexual Activity      Alcohol use:  Yes        Alcohol/week: 0.5 oz        Types: 1 Standard drinks or equivalent per week        Comment: OCC      Drug use: No      Sexual activity: Yes        Partners: Male    Family History   Problem Relatio 4/19/19: gd 2 IDC, at least 2mm, +ER and NJ, neg HER, 15% Ki67  MRI BX L 7:00 TBD      ASSESSMENT/PLAN   Assessment   Carcinoma of upper-outer quadrant of left breast in female, estrogen receptor positive (hcc)  (primary encounter diagnosis)    38year old

## 2019-04-27 NOTE — H&P
HPI:  The patient is a 25-year-old sexually active female who presents for follow-up of Mirena placement. The patient had a Mirena placed on 2/13/2018 with myself.   The patient states after placement there was 1 week without any vaginal bleeding and then Not on file        Inability: Not on file      Transportation needs:        Medical: Not on file        Non-medical: Not on file    Tobacco Use      Smoking status: Never Smoker      Smokeless tobacco: Never Used    Substance and Sexual Activity      Alcoh Allergies      Review of Systems:  Constitutional:  Denies fevers and chills   Cardiovascular:  denies chest pain or palpitations  Respiratory:  denies shortness of breath  Gastrointestinal:  denies heartburn, abdominal pain, diarrhea or constipation  Maeve questions were answered.

## 2019-04-29 ENCOUNTER — APPOINTMENT (OUTPATIENT)
Dept: HEMATOLOGY/ONCOLOGY | Facility: HOSPITAL | Age: 44
End: 2019-04-29
Attending: GENETIC COUNSELOR, MS
Payer: COMMERCIAL

## 2019-04-29 ENCOUNTER — GENETICS ENCOUNTER (OUTPATIENT)
Dept: GENETICS | Facility: HOSPITAL | Age: 44
End: 2019-04-29
Attending: GENETIC COUNSELOR, MS
Payer: COMMERCIAL

## 2019-04-29 DIAGNOSIS — C50.912 INFILTRATING DUCTAL CARCINOMA OF LEFT BREAST (HCC): Primary | ICD-10-CM

## 2019-04-29 PROCEDURE — 36415 COLL VENOUS BLD VENIPUNCTURE: CPT

## 2019-04-29 PROCEDURE — 96040 MEDICAL GENETICS COUNSELING EA 30 MIN: CPT | Performed by: GENETIC COUNSELOR, MS

## 2019-04-29 NOTE — PROGRESS NOTES
Reason for visit: Mrs. Nadiya Day is a 54-year-old woman referred to genetic counseling due to her recent diagnosis of breast cancer as well as a family history of cancer.   She was seen today to discuss the option of genetic testing and how this may help with her paternal grandfather passed away from metastatic prostate cancer in his [de-identified]. She has limited information about the rest of her paternal side of her family due to family dynamics.   Her heritage is [place] on both sides of the family and she is unaware hereditary breast cancer. Mutations in other genes have also been associated with an increased risk for breast cancer - but mutations in these other genes are statistically less often seen in hereditary breast cancer.     We discussed the benefits and Cena Aschoff the genetic testing. My office will call Mrs. Du as soon as results are received; post-test counseling can be scheduled at that time. Thank you for referring Mrs. Du to [facility]; please do not hesitate to contact my office if you have any q

## 2019-05-01 ENCOUNTER — OFFICE VISIT (OUTPATIENT)
Dept: OBGYN CLINIC | Facility: CLINIC | Age: 44
End: 2019-05-01
Payer: COMMERCIAL

## 2019-05-01 ENCOUNTER — OFFICE VISIT (OUTPATIENT)
Dept: HEMATOLOGY/ONCOLOGY | Facility: HOSPITAL | Age: 44
End: 2019-05-01
Attending: GENETIC COUNSELOR, MS
Payer: COMMERCIAL

## 2019-05-01 VITALS
BODY MASS INDEX: 33.33 KG/M2 | DIASTOLIC BLOOD PRESSURE: 45 MMHG | SYSTOLIC BLOOD PRESSURE: 120 MMHG | WEIGHT: 225 LBS | RESPIRATION RATE: 16 BRPM | TEMPERATURE: 99 F | HEART RATE: 69 BPM | HEIGHT: 69 IN | OXYGEN SATURATION: 98 %

## 2019-05-01 VITALS
SYSTOLIC BLOOD PRESSURE: 103 MMHG | HEART RATE: 70 BPM | WEIGHT: 224.81 LBS | DIASTOLIC BLOOD PRESSURE: 64 MMHG | BODY MASS INDEX: 33 KG/M2

## 2019-05-01 DIAGNOSIS — D50.0 IRON DEFICIENCY ANEMIA DUE TO CHRONIC BLOOD LOSS: ICD-10-CM

## 2019-05-01 DIAGNOSIS — Z30.432 ENCOUNTER FOR IUD REMOVAL: ICD-10-CM

## 2019-05-01 DIAGNOSIS — N92.0 MENORRHAGIA WITH REGULAR CYCLE: Primary | ICD-10-CM

## 2019-05-01 DIAGNOSIS — C50.412 MALIGNANT NEOPLASM OF UPPER-OUTER QUADRANT OF LEFT BREAST IN FEMALE, ESTROGEN RECEPTOR POSITIVE (HCC): ICD-10-CM

## 2019-05-01 DIAGNOSIS — C50.412 MALIGNANT NEOPLASM OF UPPER-OUTER QUADRANT OF LEFT BREAST IN FEMALE, ESTROGEN RECEPTOR POSITIVE (HCC): Primary | ICD-10-CM

## 2019-05-01 DIAGNOSIS — Z17.0 MALIGNANT NEOPLASM OF UPPER-OUTER QUADRANT OF LEFT BREAST IN FEMALE, ESTROGEN RECEPTOR POSITIVE (HCC): Primary | ICD-10-CM

## 2019-05-01 DIAGNOSIS — Z17.0 MALIGNANT NEOPLASM OF UPPER-OUTER QUADRANT OF LEFT BREAST IN FEMALE, ESTROGEN RECEPTOR POSITIVE (HCC): ICD-10-CM

## 2019-05-01 PROCEDURE — 99213 OFFICE O/P EST LOW 20 MIN: CPT | Performed by: OBSTETRICS & GYNECOLOGY

## 2019-05-01 PROCEDURE — 99245 OFF/OP CONSLTJ NEW/EST HI 55: CPT | Performed by: INTERNAL MEDICINE

## 2019-05-01 PROCEDURE — 58301 REMOVE INTRAUTERINE DEVICE: CPT | Performed by: OBSTETRICS & GYNECOLOGY

## 2019-05-01 NOTE — H&P
HPI:  The patient is a 39 yo sexually active female with history of HMB presents for IUD removal after recent dx of ER/NH+ infiltrating ductal breast CA of left breast.  Per Breast sx and med/onc needs IUD removed. Patient coping overall well with new dx. Yes        Alcohol/week: 4.8 oz        Types: 6 Cans of beer, 2 Glasses of wine per week        Frequency: 2-4 times a month        Drinks per session: 5 or 6        Binge frequency: Monthly      Drug use: No      Sexual activity: Yes        Partners:  Male abnormal vaginal discharge, vaginal itching  Musculoskeletal:  denies back pain. Skin/Breast:  Denies any breast pain, lumps, or discharge. Neurological:  denies headaches, extremity weakness  Psychiatric: denies depression or anxiety.        05/01/19  1 and then will d/w Hector White and Marisol Grady treatment plan. Pt reports that Dr. Marisol Grady may recommend ovarian suppression surgery.   If this is the case, we discussed that this will stop her periods, however she will be in a surgical menopause w/o the ability to hel

## 2019-05-01 NOTE — PROGRESS NOTES
CHARLENE       Vivian Lama is a 38year old female who is here today due to new diagnosis of Malignant neoplasm of upper-outer quadrant of left breast in female, estrogen receptor positive (hcc)  (primary encounter diagnosis)  Iron deficiency anemia due t Patient went for her routine mammogram on February 13, 2019 in the left breast on the lateral quadrant had an area of possible architectural distortion without any suspicious microcalcifications the patient then underwent additional views on February 28, 2 She has a Mirena IUD in place. This was placed due to menorrhagia. She has iron deficiency anemia due to the menorrhagia and irregular menses. Taking iron supplement QOD. States tired and fatigued and at times dizzy.   States that the iron pills did cau Current Outpatient Medications:  Albuterol Sulfate HFA (PROAIR HFA) 108 (90 Base) MCG/ACT Inhalation Aero Soln Inhale 2 puffs into the lungs every 4 (four) hours as needed for Wheezing or Shortness of Breath.  Disp: 1 Inhaler Rfl: 0     Allergies:   No Know Attends meetings of clubs or organizations: Not on file        Relationship status: Not on file      Intimate partner violence:        Fear of current or ex partner: Not on file        Emotionally abused: Not on file        Physically abused: Not on Neurological: She is alert. Skin: Skin is warm. She is not diaphoretic. No erythema. Psychiatric: She has a normal mood and affect. Vitals reviewed.           ASSESSMENT/PLAN:   Malignant neoplasm of upper-outer quadrant of left breast in female, estr Discussed NCCN guidelines for ER/NE + breast cancer that is LN negative and up to 3 LN positive with primary tumor 5 mm or greater that  recommend to have Oncotype Dx to determine if benefit from adjuvant chemotherapy in addition to adjuvant hormonal thera COMPARISON: [facility], [address] BREAST LEFT TARGETED (PXU=[id number]), 2/28/2019, 14:50. [address] [facility], [facility] SCREENING BILAT (CBP=[id number]), 11/07/2017, 15:14.   [facility], [facility]. [facility], [facility] not within the mammographic finding  further evaluation with MRI would be recommended. The axilla was scanned and was grossly unremarkable.   A small cyst is seen at 4 o'clock about 5 cm from the nipple measuring about 6 mm and another small complicated INDICATIONS:  Encounter for screening mammogram for malignant neoplasm of breast     TECHNIQUE:    Full field direct screening mammography was performed and images were reviewed with the R2 SHERRY [de-identified] CAD device.   3D tomosynthesis was performed and review Surgical Pathology                                Case: [id number]                                 Authorizing Provider:  Nick Daily MD        Collected:           04/19/2019 12:25 PM         Ordering Location:     [facility] 1+ (Negative): Weak and incomplete membrane staining in more than 10% of tumor cells  2+ (Equivocal): Weak to moderate complete membranous staining in more than 10% of tumor cells.   3+ (Positive): Strong complete staining in more than 10% of tumor cells

## 2019-05-02 ENCOUNTER — HOSPITAL ENCOUNTER (OUTPATIENT)
Dept: MRI IMAGING | Facility: HOSPITAL | Age: 44
Discharge: HOME | End: 2019-05-02
Attending: SURGERY
Payer: COMMERCIAL

## 2019-05-02 ENCOUNTER — HOSPITAL ENCOUNTER (OUTPATIENT)
Dept: MAMMOGRAPHY | Facility: HOSPITAL | Age: 44
Discharge: HOME | End: 2019-05-02
Attending: SURGERY
Payer: COMMERCIAL

## 2019-05-02 ENCOUNTER — TELEPHONE (OUTPATIENT)
Dept: HEMATOLOGY/ONCOLOGY | Facility: HOSPITAL | Age: 44
End: 2019-05-02

## 2019-05-02 VITALS — DIASTOLIC BLOOD PRESSURE: 51 MMHG | SYSTOLIC BLOOD PRESSURE: 105 MMHG | HEART RATE: 62 BPM | RESPIRATION RATE: 15 BRPM

## 2019-05-02 DIAGNOSIS — R92.8 ABNORMAL MAGNETIC RESONANCE IMAGING OF LEFT BREAST: ICD-10-CM

## 2019-05-02 DIAGNOSIS — C50.912 INFILTRATING DUCTAL CARCINOMA OF BREAST, LEFT (HCC): ICD-10-CM

## 2019-05-02 PROCEDURE — 19085 BX BREAST 1ST LESION MR IMAG: CPT | Performed by: SURGERY

## 2019-05-02 PROCEDURE — 88360 TUMOR IMMUNOHISTOCHEM/MANUAL: CPT | Performed by: SURGERY

## 2019-05-02 PROCEDURE — 77065 DX MAMMO INCL CAD UNI: CPT | Performed by: SURGERY

## 2019-05-02 PROCEDURE — 88305 TISSUE EXAM BY PATHOLOGIST: CPT | Performed by: SURGERY

## 2019-05-02 PROCEDURE — A9575 INJ GADOTERATE MEGLUMI 0.1ML: HCPCS | Performed by: SURGERY

## 2019-05-02 RX ORDER — LIDOCAINE HYDROCHLORIDE AND EPINEPHRINE 10; 10 MG/ML; UG/ML
20 INJECTION, SOLUTION INFILTRATION; PERINEURAL ONCE
Status: COMPLETED | OUTPATIENT
Start: 2019-05-02 | End: 2019-05-02

## 2019-05-02 RX ORDER — LIDOCAINE HYDROCHLORIDE 10 MG/ML
INJECTION, SOLUTION EPIDURAL; INFILTRATION; INTRACAUDAL; PERINEURAL
Status: COMPLETED
Start: 2019-05-02 | End: 2019-05-02

## 2019-05-02 RX ORDER — LIDOCAINE HYDROCHLORIDE AND EPINEPHRINE 10; 10 MG/ML; UG/ML
INJECTION, SOLUTION INFILTRATION; PERINEURAL
Status: COMPLETED
Start: 2019-05-02 | End: 2019-05-02

## 2019-05-02 RX ORDER — LIDOCAINE HYDROCHLORIDE 10 MG/ML
5 INJECTION, SOLUTION EPIDURAL; INFILTRATION; INTRACAUDAL; PERINEURAL ONCE
Status: COMPLETED | OUTPATIENT
Start: 2019-05-02 | End: 2019-05-02

## 2019-05-02 RX ORDER — SODIUM CHLORIDE 9 MG/ML
INJECTION, SOLUTION INTRAVENOUS
Status: DISCONTINUED
Start: 2019-05-02 | End: 2019-05-02

## 2019-05-02 RX ADMIN — LIDOCAINE HYDROCHLORIDE 2.5 ML: 10 INJECTION, SOLUTION EPIDURAL; INFILTRATION; INTRACAUDAL; PERINEURAL at 07:27:00

## 2019-05-02 RX ADMIN — LIDOCAINE HYDROCHLORIDE AND EPINEPHRINE 10 ML: 10; 10 INJECTION, SOLUTION INFILTRATION; PERINEURAL at 07:28:00

## 2019-05-02 NOTE — PROCEDURES
[facility]  Procedure Note    Rodolfo Palisades Patient Status:  Outpatient    1975 MRN [id number]   Location [facility] MRI Attending Della Olmos MD   Hosp Day # 0 PCP Tracey Escobar MD     Procedure: Left breast MRI guid

## 2019-05-02 NOTE — IMAGING NOTE
0171 To Radiology holding area hx as follow: HX LEFT BREAST BIOPSY WITH BREAST CA.     0650  IV 22 gauge started by Jay Hernandez, RN    3663 Dr Emma Aguilar here procedure explained questions answered .     1991 Consent verified and obtained  Site marked LEFT Breast

## 2019-05-02 NOTE — TELEPHONE ENCOUNTER
Pt called per Dr. Dori Pham request and notified to take OTC iron pill daily and OTC folic acid 185 mcg daily. Pt verbalizes understanding.

## 2019-05-06 ENCOUNTER — TELEPHONE (OUTPATIENT)
Dept: HEMATOLOGY/ONCOLOGY | Facility: HOSPITAL | Age: 44
End: 2019-05-06

## 2019-05-06 NOTE — TELEPHONE ENCOUNTER
Phoned patient to provide support s/p MRI guided biopsy. Patient shares that she is \"doing better than Friday\". Discussed next steps with patient. She shares that she is seeking a second opinion at [facility] in [facility] later this week.

## 2019-05-09 ENCOUNTER — TELEPHONE (OUTPATIENT)
Dept: GENETICS | Facility: HOSPITAL | Age: 44
End: 2019-05-09

## 2019-05-09 NOTE — TELEPHONE ENCOUNTER
LM for Karla that her genetic testing results yielded negative results for all 83 genes studied ([employer] Multi-Cancer Panel, [employer]) aside from 2 VUS. One VUS in NBN (c.2099C>T) and one VUS in PTCH1 (c.2015C>T).  Neither of these change any management mavis

## 2019-05-13 ENCOUNTER — TELEPHONE (OUTPATIENT)
Dept: HEMATOLOGY/ONCOLOGY | Facility: HOSPITAL | Age: 44
End: 2019-05-13

## 2019-05-13 NOTE — TELEPHONE ENCOUNTER
Phoned patient for continued support and follow up. Patient shares she spoke with her daughter over the weekend and shared her new diagnosis. Patient shares this went well, and that her daughter is coping well with the news.     Patient shares she had her

## 2019-06-04 ENCOUNTER — TELEPHONE (OUTPATIENT)
Dept: HEMATOLOGY/ONCOLOGY | Facility: HOSPITAL | Age: 44
End: 2019-06-04

## 2019-06-04 NOTE — TELEPHONE ENCOUNTER
Patient returned call. She shares that she has decided to pursue her care with [facility]. Emotional support provided. Encouraged patient to contact me should she need anything in the future.

## 2019-06-21 ENCOUNTER — OFFICE VISIT (OUTPATIENT)
Dept: INTERNAL MEDICINE CLINIC | Facility: CLINIC | Age: 44
End: 2019-06-21
Payer: COMMERCIAL

## 2019-06-21 VITALS
BODY MASS INDEX: 34 KG/M2 | OXYGEN SATURATION: 97 % | WEIGHT: 228 LBS | DIASTOLIC BLOOD PRESSURE: 78 MMHG | TEMPERATURE: 99 F | SYSTOLIC BLOOD PRESSURE: 110 MMHG | HEART RATE: 72 BPM

## 2019-06-21 DIAGNOSIS — E78.00 HIGH CHOLESTEROL: Primary | ICD-10-CM

## 2019-06-21 DIAGNOSIS — R21 RASH: ICD-10-CM

## 2019-06-21 DIAGNOSIS — D50.0 IRON DEFICIENCY ANEMIA DUE TO CHRONIC BLOOD LOSS: ICD-10-CM

## 2019-06-21 PROCEDURE — 99213 OFFICE O/P EST LOW 20 MIN: CPT | Performed by: INTERNAL MEDICINE

## 2019-06-21 PROCEDURE — 99396 PREV VISIT EST AGE 40-64: CPT | Performed by: INTERNAL MEDICINE

## 2019-06-21 RX ORDER — HYDROCODONE BITARTRATE AND ACETAMINOPHEN 5; 325 MG/1; MG/1
1 TABLET ORAL EVERY 6 HOURS PRN
COMMUNITY
End: 2020-08-04

## 2019-06-21 RX ORDER — GABAPENTIN 300 MG/1
300 CAPSULE ORAL NIGHTLY
COMMUNITY
End: 2020-08-04

## 2019-06-21 RX ORDER — AMOXICILLIN AND CLAVULANATE POTASSIUM 875; 125 MG/1; MG/1
1 TABLET, FILM COATED ORAL 2 TIMES DAILY
COMMUNITY
End: 2020-08-04

## 2019-06-21 RX ORDER — IBUPROFEN 600 MG/1
600 TABLET ORAL EVERY 6 HOURS PRN
COMMUNITY

## 2019-06-21 NOTE — PATIENT INSTRUCTIONS
ASSESSMENT/PLAN:   High cholesterol  (primary encounter diagnosis) Check blood. Iron deficiency anemia due to chronic blood loss if cannot tolerate iron take iron 325 mg twice a day with vitamin C 500 IUs twice a day and recheck levels in 3 months.

## 2019-06-21 NOTE — PROGRESS NOTES
HPI:    Patient ID: Trung Cadet is a 38year old female. Trung Cadet is a 38year old female who presents for a complete physical exam.   HPI:   Patient presents with:  Physical: breast cancer/surgery       No LMP recorded.      Wt Readings from [employer] Albuterol Sulfate HFA (PROAIR HFA) 108 (90 Base) MCG/ACT Inhalation Aero Soln Inhale 2 puffs into the lungs every 4 (four) hours as needed for Wheezing or Shortness of Breath.  Disp: 1 Inhaler Rfl: 0      Past Medical History:   Diagnosis Date   • Breast ca Eye exam done 7 yrs. Diifculty reading close, has readers. Hypertension  Patient is here for follow up of hypertension. BP at home: lower at home. Has been compliant with medications.   Exercise level: somewhat active and has been following low salt d Gastrointestinal: Negative for abdominal distention, abdominal pain, anal bleeding, blood in stool, constipation, diarrhea, nausea, rectal pain and vomiting. Endocrine: Negative for cold intolerance, heat intolerance, polydipsia, polyphagia and polyuria. • Breast cancer ([facility] [address] 04/19/2019   • Iron deficiency anemia 2017   • Varicose veins of both lower extremities       Past Surgical History:   Procedure Laterality Date   • BIOPSY OF BREAST, NEEDLE CORE Left 04/19/2019   • COLONOSCOPY  2009   • IR VARICOSE V Left Ear: Tympanic membrane, external ear and ear canal normal. No lacerations. No drainage, swelling or tenderness. No foreign bodies. No mastoid tenderness.  Tympanic membrane is not injected, not scarred, not perforated, not erythematous, not retracted a Neck: Trachea normal and phonation normal. Neck supple. Normal carotid pulses, no hepatojugular reflux and no JVD present. No tracheal tenderness present. Carotid bruit is not present. No tracheal deviation present.  No thyroid mass and no thyromegaly prese Head (left side): No submental, no submandibular, no preauricular, no posterior auricular and no occipital adenopathy present. She has no cervical adenopathy.         Right cervical: No superficial cervical, no deep cervical and no posterior cervic

## 2019-06-23 ENCOUNTER — APPOINTMENT (OUTPATIENT)
Dept: LAB | Facility: HOSPITAL | Age: 44
End: 2019-06-23
Attending: INTERNAL MEDICINE
Payer: COMMERCIAL

## 2019-06-23 PROCEDURE — 80053 COMPREHEN METABOLIC PANEL: CPT | Performed by: INTERNAL MEDICINE

## 2019-06-23 PROCEDURE — 84443 ASSAY THYROID STIM HORMONE: CPT | Performed by: INTERNAL MEDICINE

## 2019-06-23 PROCEDURE — 36415 COLL VENOUS BLD VENIPUNCTURE: CPT | Performed by: INTERNAL MEDICINE

## 2019-06-23 PROCEDURE — 82607 VITAMIN B-12: CPT | Performed by: INTERNAL MEDICINE

## 2019-06-23 PROCEDURE — 80061 LIPID PANEL: CPT | Performed by: INTERNAL MEDICINE

## 2019-09-27 ENCOUNTER — OFFICE VISIT (OUTPATIENT)
Dept: INTERNAL MEDICINE CLINIC | Facility: CLINIC | Age: 44
End: 2019-09-27
Payer: COMMERCIAL

## 2019-09-27 ENCOUNTER — TELEPHONE (OUTPATIENT)
Dept: INTERNAL MEDICINE CLINIC | Facility: CLINIC | Age: 44
End: 2019-09-27

## 2019-09-27 VITALS
SYSTOLIC BLOOD PRESSURE: 97 MMHG | TEMPERATURE: 98 F | HEART RATE: 69 BPM | OXYGEN SATURATION: 98 % | WEIGHT: 226 LBS | DIASTOLIC BLOOD PRESSURE: 62 MMHG | BODY MASS INDEX: 33 KG/M2

## 2019-09-27 DIAGNOSIS — D50.0 IRON DEFICIENCY ANEMIA DUE TO CHRONIC BLOOD LOSS: ICD-10-CM

## 2019-09-27 DIAGNOSIS — E78.00 HIGH CHOLESTEROL: Primary | ICD-10-CM

## 2019-09-27 DIAGNOSIS — E53.8 VITAMIN B 12 DEFICIENCY: ICD-10-CM

## 2019-09-27 PROCEDURE — 96372 THER/PROPH/DIAG INJ SC/IM: CPT | Performed by: INTERNAL MEDICINE

## 2019-09-27 PROCEDURE — 99214 OFFICE O/P EST MOD 30 MIN: CPT | Performed by: INTERNAL MEDICINE

## 2019-09-27 RX ORDER — TAMOXIFEN CITRATE 20 MG/1
20 TABLET ORAL DAILY
COMMUNITY
End: 2020-08-04

## 2019-09-27 RX ORDER — CYANOCOBALAMIN 1000 UG/ML
1000 INJECTION INTRAMUSCULAR; SUBCUTANEOUS ONCE
Status: COMPLETED | OUTPATIENT
Start: 2019-09-27 | End: 2019-09-27

## 2019-09-27 RX ADMIN — CYANOCOBALAMIN 1000 MCG: 1000 INJECTION INTRAMUSCULAR; SUBCUTANEOUS at 15:57:00

## 2019-09-27 NOTE — PROGRESS NOTES
HPI:    Patient ID: Cecile Khan is a 38year old female. HPI   2nd surgery at end of Oct. For breast with reconstruction. When wears bra, L side rubs. Has underwire. Exercise level: moderately active and has been following low salt diet.   Weight ibuprofen 600 MG Oral Tab Take 600 mg by mouth every 6 (six) hours as needed for Pain. Disp:  Rfl:    Amoxicillin-Pot Clavulanate 875-125 MG Oral Tab Take 1 tablet by mouth 2 (two) times daily.  Disp:  Rfl:    gabapentin 300 MG Oral Cap Take 300 mg by mouth Constitutional: She is oriented to person, place, and time. Vital signs are normal. She appears well-developed and well-nourished. She is active. Non-toxic appearance. She does not have a sickly appearance. She does not appear ill. No distress.  She is not Requested Prescriptions      No prescriptions requested or ordered in this encounter       Imaging & Referrals:  None     Refused flu vaccine.         AM#[id number]

## 2019-09-27 NOTE — PATIENT INSTRUCTIONS
ASSESSMENT/PLAN:   High cholesterol  (primary encounter diagnosis) Doing good 6-19. Iron deficiency anemia due to chronic blood loss Take iron 325 mg every 12 hrs. With vitamin C 500 iu 2 times a day til check blood in 3 months.      Vitamin b 12

## 2019-12-27 ENCOUNTER — OFFICE VISIT (OUTPATIENT)
Dept: INTERNAL MEDICINE CLINIC | Facility: CLINIC | Age: 44
End: 2019-12-27
Payer: COMMERCIAL

## 2019-12-27 VITALS
HEART RATE: 73 BPM | RESPIRATION RATE: 18 BRPM | TEMPERATURE: 98 F | DIASTOLIC BLOOD PRESSURE: 69 MMHG | HEIGHT: 69 IN | BODY MASS INDEX: 33.83 KG/M2 | WEIGHT: 228.38 LBS | SYSTOLIC BLOOD PRESSURE: 109 MMHG | OXYGEN SATURATION: 99 %

## 2019-12-27 DIAGNOSIS — Z71.85 IMMUNIZATION COUNSELING: ICD-10-CM

## 2019-12-27 DIAGNOSIS — Z17.0 MALIGNANT NEOPLASM OF UPPER-OUTER QUADRANT OF LEFT BREAST IN FEMALE, ESTROGEN RECEPTOR POSITIVE (HCC): ICD-10-CM

## 2019-12-27 DIAGNOSIS — C50.412 MALIGNANT NEOPLASM OF UPPER-OUTER QUADRANT OF LEFT BREAST IN FEMALE, ESTROGEN RECEPTOR POSITIVE (HCC): ICD-10-CM

## 2019-12-27 DIAGNOSIS — E78.00 HIGH CHOLESTEROL: Primary | ICD-10-CM

## 2019-12-27 PROCEDURE — 99213 OFFICE O/P EST LOW 20 MIN: CPT | Performed by: INTERNAL MEDICINE

## 2019-12-27 NOTE — PROGRESS NOTES
HPI:    Patient ID: Trung Cadet is a 38year old female. HPI   Saw oncology 10-18-19 [facility]. Lupron shot q3 months. On supplements and B 12. On 5 vitamins:  MVI, B 12, D.      Exercise level: not active and has not been follow Citrate 20 MG Oral Tab Take 20 mg by mouth daily. • ibuprofen 600 MG Oral Tab Take 600 mg by mouth every 6 (six) hours as needed for Pain. • Amoxicillin-Pot Clavulanate 875-125 MG Oral Tab Take 1 tablet by mouth 2 (two) times daily.      • gabapenti 73   Temp 98.2 °F (36.8 °C) (Oral)   Resp 18   Ht 5' 9\" (1.753 m)   Wt 228 lb 6.4 oz (103.6 kg)   LMP 10/27/2019   SpO2 99%   BMI 33.73 kg/m²   BP Readings from Last 3 Encounters:  12/27/19 : 109/69  09/27/19 : 97/62  06/21/19 : 110/78    Wt Readings from neoplasm of upper-outer quadrant of left breast in female, estrogen receptor positive (hcc) Doing good. Follow up with cancer centers. Immunization counseling Refused flu. No orders of the defined types were placed in this encounter.       Meds This

## 2019-12-27 NOTE — PATIENT INSTRUCTIONS
ASSESSMENT/PLAN:   High cholesterol  (primary encounter diagnosis) Stable levels. Malignant neoplasm of upper-outer quadrant of left breast in female, estrogen receptor positive (hcc) Doing good. Follow up with cancer centers.      Immunization coun

## 2020-03-12 ENCOUNTER — NURSE TRIAGE (OUTPATIENT)
Dept: OTHER | Age: 45
End: 2020-03-12

## 2020-03-12 ENCOUNTER — OFFICE VISIT (OUTPATIENT)
Dept: INTERNAL MEDICINE CLINIC | Facility: CLINIC | Age: 45
End: 2020-03-12
Payer: COMMERCIAL

## 2020-03-12 VITALS
RESPIRATION RATE: 18 BRPM | WEIGHT: 230 LBS | SYSTOLIC BLOOD PRESSURE: 94 MMHG | HEIGHT: 69 IN | DIASTOLIC BLOOD PRESSURE: 59 MMHG | TEMPERATURE: 98 F | OXYGEN SATURATION: 98 % | HEART RATE: 83 BPM | BODY MASS INDEX: 34.07 KG/M2

## 2020-03-12 DIAGNOSIS — I95.0 IDIOPATHIC HYPOTENSION: ICD-10-CM

## 2020-03-12 DIAGNOSIS — J02.9 SORE THROAT: Primary | ICD-10-CM

## 2020-03-12 DIAGNOSIS — R05.9 COUGH: ICD-10-CM

## 2020-03-12 LAB
CONTROL LINE PRESENT WITH A CLEAR BACKGROUND (YES/NO): YES YES/NO
KIT LOT #: NORMAL NUMERIC

## 2020-03-12 PROCEDURE — 87880 STREP A ASSAY W/OPTIC: CPT | Performed by: INTERNAL MEDICINE

## 2020-03-12 PROCEDURE — 99213 OFFICE O/P EST LOW 20 MIN: CPT | Performed by: INTERNAL MEDICINE

## 2020-03-12 NOTE — TELEPHONE ENCOUNTER
Action Requested: Summary for Provider     []  Critical Lab, Recommendations Needed  [] Need Additional Advice  []   FYI    []   Need Orders  [] Need Medications Sent to Pharmacy  []  Other     SUMMARY: Per protocol advised : OV today has appt at 3pm with

## 2020-03-12 NOTE — PROGRESS NOTES
HPI:    Patient ID: Zaria Layton is a 54year old female.     HPI  Comes in today with complaint of sore throat initially started few weeks ago after she came back from [place] then that subsided felt better than 2 days ago started with sore throat a HENT:   Head: Normocephalic and atraumatic. Mouth/Throat: No oropharyngeal exudate. Eyes: Pupils are equal, round, and reactive to light. Conjunctivae are normal. No scleral icterus. Neck: Normal range of motion. Neck supple.    Cardiovascular: Norm

## 2020-06-25 PROBLEM — Z00.00 ADULT GENERAL MEDICAL EXAMINATION: Status: ACTIVE | Noted: 2020-06-25

## 2020-06-25 PROBLEM — I83.10 VARICOSE VEINS OF LOWER EXTREMITY WITH INFLAMMATION: Status: ACTIVE | Noted: 2020-06-25

## 2020-06-25 RX ORDER — ANASTROZOLE 1 MG/1
TABLET ORAL
COMMUNITY
Start: 2020-02-23

## 2020-06-26 ENCOUNTER — OFFICE VISIT (OUTPATIENT)
Dept: INTERNAL MEDICINE CLINIC | Facility: CLINIC | Age: 45
End: 2020-06-26
Payer: COMMERCIAL

## 2020-06-26 VITALS
DIASTOLIC BLOOD PRESSURE: 68 MMHG | HEART RATE: 60 BPM | OXYGEN SATURATION: 98 % | TEMPERATURE: 98 F | WEIGHT: 230.38 LBS | RESPIRATION RATE: 18 BRPM | SYSTOLIC BLOOD PRESSURE: 109 MMHG | HEIGHT: 69 IN | BODY MASS INDEX: 34.12 KG/M2

## 2020-06-26 DIAGNOSIS — G25.81 RESTLESS LEG SYNDROME: ICD-10-CM

## 2020-06-26 DIAGNOSIS — Z17.0 MALIGNANT NEOPLASM OF UPPER-OUTER QUADRANT OF LEFT BREAST IN FEMALE, ESTROGEN RECEPTOR POSITIVE (HCC): ICD-10-CM

## 2020-06-26 DIAGNOSIS — D50.0 IRON DEFICIENCY ANEMIA DUE TO CHRONIC BLOOD LOSS: ICD-10-CM

## 2020-06-26 DIAGNOSIS — R21 RASH: ICD-10-CM

## 2020-06-26 DIAGNOSIS — G47.09 OTHER INSOMNIA: ICD-10-CM

## 2020-06-26 DIAGNOSIS — Z00.00 ADULT GENERAL MEDICAL EXAMINATION: ICD-10-CM

## 2020-06-26 DIAGNOSIS — K46.9 HERNIA OF ABDOMINAL CAVITY: ICD-10-CM

## 2020-06-26 DIAGNOSIS — E53.8 VITAMIN B 12 DEFICIENCY: ICD-10-CM

## 2020-06-26 DIAGNOSIS — H53.9 VISION CHANGES: ICD-10-CM

## 2020-06-26 DIAGNOSIS — E78.00 HIGH CHOLESTEROL: Primary | ICD-10-CM

## 2020-06-26 DIAGNOSIS — Z71.85 IMMUNIZATION COUNSELING: ICD-10-CM

## 2020-06-26 DIAGNOSIS — M79.672 PAIN OF LEFT HEEL: ICD-10-CM

## 2020-06-26 DIAGNOSIS — C50.412 MALIGNANT NEOPLASM OF UPPER-OUTER QUADRANT OF LEFT BREAST IN FEMALE, ESTROGEN RECEPTOR POSITIVE (HCC): ICD-10-CM

## 2020-06-26 PROCEDURE — 99396 PREV VISIT EST AGE 40-64: CPT | Performed by: INTERNAL MEDICINE

## 2020-06-26 PROCEDURE — 81003 URINALYSIS AUTO W/O SCOPE: CPT | Performed by: INTERNAL MEDICINE

## 2020-06-26 PROCEDURE — 99214 OFFICE O/P EST MOD 30 MIN: CPT | Performed by: INTERNAL MEDICINE

## 2020-06-26 RX ORDER — MULTIVIT WITH MINERALS/LUTEIN
1000 TABLET ORAL DAILY
COMMUNITY

## 2020-06-26 RX ORDER — ERGOCALCIFEROL 1.25 MG/1
CAPSULE ORAL DAILY
COMMUNITY

## 2020-06-26 RX ORDER — BETAMETHASONE DIPROPIONATE 0.5 MG/G
1 CREAM TOPICAL 2 TIMES DAILY
Qty: 45 G | Refills: 1 | Status: SHIPPED | OUTPATIENT
Start: 2020-06-26

## 2020-06-26 NOTE — PROGRESS NOTES
HPI:    Patient ID: Monroe Rangel is a 54year old female. Monroe Rangel is a 54year old female who presents for a complete physical exam.   HPI:   Patient presents with:  Physical: anual exam       Patient's last menstrual period was 10/27/2019.  O • betamethasone dipropionate 0.05 % External Cream Apply 1 Application topically 2 (two) times daily. 45 g 1   • anastrozole 1 MG Oral Tab tab TK 1 T PO ONE TIME D     • ibuprofen 600 MG Oral Tab Take 600 mg by mouth every 6 (six) hours as needed for Pain. Smokeless tobacco: Never Used    Substance and Sexual Activity      Alcohol use:  Yes        Alcohol/week: 8.0 standard drinks        Types: 6 Cans of beer, 2 Glasses of wine per week        Frequency: 2-4 times a month        Drinks per session: 5 or 6 This is a new problem. The current episode started more than 1 month ago. The problem occurs intermittently. The problem has been unchanged. Associated symptoms include arthralgias, a rash and a visual change.  Pertinent negatives include no abdominal pain, Endocrine: Negative for cold intolerance, heat intolerance, polydipsia, polyphagia and polyuria.    Genitourinary: Negative for decreased urine volume, difficulty urinating, dysuria, flank pain, frequency, hematuria, menstrual problem, pelvic pain, urgency, • mupirocin 2% Nasal Ointment 1 Application by Each Nare route 2 (two) times daily.      • Albuterol Sulfate HFA (PROAIR HFA) 108 (90 Base) MCG/ACT Inhalation Aero Soln Inhale 2 puffs into the lungs every 4 (four) hours as needed for Wheezing or Shortness o Constitutional: She is oriented to person, place, and time. Vital signs are normal. She appears well-developed and well-nourished. She is active. Non-toxic appearance. She does not have a sickly appearance. She does not appear ill. No distress.  She is not Eyes: Pupils are equal, round, and reactive to light. Conjunctivae, EOM and lids are normal. Right eye exhibits no chemosis, no discharge, no exudate and no hordeolum. No foreign body present in the right eye.  Left eye exhibits no chemosis, no discharge, n Abdominal: Soft. Bowel sounds are normal. She exhibits no distension, no fluid wave, no ascites and no mass. There is no hepatosplenomegaly, splenomegaly or hepatomegaly. There is no tenderness.  There is no rigidity, no rebound, no guarding and no CVA tend Left axillary: No pectoral and no lateral adenopathy present. Right: No supraclavicular adenopathy present. Left: No supraclavicular adenopathy present. Neurological: She is alert and oriented to person, place, and time.    Skin: Skin is Sig: Apply 1 Application topically 2 (two) times daily. Imaging & Referrals:  OPHTHALMOLOGY - INTERNAL  DERM - INTERNAL     RTC 6 months for follow up.

## 2020-06-26 NOTE — PATIENT INSTRUCTIONS
ASSESSMENT/PLAN:   High cholesterol  (primary encounter diagnosis) Check blood. Iron deficiency anemia due to chronic blood loss Check blood.      Malignant neoplasm of upper-outer quadrant of left breast in female, estrogen receptor positive (hcc) Stab more pain. What causes heel pain? Wearing shoes with poor cushioning can irritate the tissue in your heel (plantar fascia). Being overweight or standing for long periods can also irritate the tissue.  Running, walking, tennis, and other sports that put st bottom of your foot. It holds the foot bones in an arched position. Plantar fasciitis is a painful swelling of the plantar fascia. A heel spur is an overgrowth of bone where the plantar fascia attaches to the heel bone.  The heel spur itself usually doesn’ the toes move toward your knee. · Icing may help control heel pain. Apply an ice pack to the heel for 10 to 20 minutes as a preventive. Or ice your heel after a severe flare-up of symptoms. You may repeat this every 1 to 2 hours as needed.   · You may use  flat foot, high arch. Wearing high heels, loose shoes, or shoes with poor arch support for long periods of time adds to the risk. This problem is commonly found in runners and dancers.  It also found in people who stand on hard surfaces for long periods of medicines. Follow-up care  Follow up with your healthcare provider, or as advised. Call for an appointment if pain worsens or there is no relief after a few weeks of home treatment. Shoe inserts, a night splint, or a special boot may be required.   If X-r stretched.  You can lessen the strain on the plantar fascia and the chance of overuse by:  · Losing any excess weight  · Not running on hard or uneven ground  · Using orthoses, if recommended, in your shoes and house slippers  If surgery is needed  Your hea

## 2020-06-27 NOTE — PROGRESS NOTES
CBC Normal (white blood cells and red blood cells and platelets), 12 is okay. Iron and iron storage better but still low. Can you iron 325 once a day over-the-counter with vitamin C 500 IUs once a day to help absorb iron and recheck in 3 months again.   O

## 2020-08-04 ENCOUNTER — OFFICE VISIT (OUTPATIENT)
Dept: INTERNAL MEDICINE CLINIC | Facility: CLINIC | Age: 45
End: 2020-08-04
Payer: COMMERCIAL

## 2020-08-04 VITALS
HEIGHT: 67.75 IN | DIASTOLIC BLOOD PRESSURE: 62 MMHG | OXYGEN SATURATION: 96 % | SYSTOLIC BLOOD PRESSURE: 92 MMHG | BODY MASS INDEX: 35.17 KG/M2 | TEMPERATURE: 98 F | HEART RATE: 60 BPM | WEIGHT: 229.38 LBS

## 2020-08-04 DIAGNOSIS — E78.00 HIGH CHOLESTEROL: ICD-10-CM

## 2020-08-04 DIAGNOSIS — Z17.0 MALIGNANT NEOPLASM OF UPPER-OUTER QUADRANT OF LEFT BREAST IN FEMALE, ESTROGEN RECEPTOR POSITIVE (HCC): ICD-10-CM

## 2020-08-04 DIAGNOSIS — H92.02 LEFT EAR PAIN: ICD-10-CM

## 2020-08-04 DIAGNOSIS — D50.0 IRON DEFICIENCY ANEMIA DUE TO CHRONIC BLOOD LOSS: Primary | ICD-10-CM

## 2020-08-04 DIAGNOSIS — C50.412 MALIGNANT NEOPLASM OF UPPER-OUTER QUADRANT OF LEFT BREAST IN FEMALE, ESTROGEN RECEPTOR POSITIVE (HCC): ICD-10-CM

## 2020-08-04 PROCEDURE — 99214 OFFICE O/P EST MOD 30 MIN: CPT | Performed by: INTERNAL MEDICINE

## 2020-08-04 PROCEDURE — 3078F DIAST BP <80 MM HG: CPT | Performed by: INTERNAL MEDICINE

## 2020-08-04 PROCEDURE — 3074F SYST BP LT 130 MM HG: CPT | Performed by: INTERNAL MEDICINE

## 2020-08-04 PROCEDURE — 3008F BODY MASS INDEX DOCD: CPT | Performed by: INTERNAL MEDICINE

## 2020-08-04 RX ORDER — FOLIC ACID 1 MG/1
TABLET ORAL
COMMUNITY
Start: 2020-08-03

## 2020-08-04 RX ORDER — NEOMYCIN SULFATE, POLYMYXIN B SULFATE, HYDROCORTISONE 3.5; 10000; 1 MG/ML; [USP'U]/ML; MG/ML
SOLUTION/ DROPS AURICULAR (OTIC)
Qty: 1 BOTTLE | Refills: 0 | Status: SHIPPED | OUTPATIENT
Start: 2020-08-04

## 2020-08-04 RX ORDER — AMOXICILLIN AND CLAVULANATE POTASSIUM 875; 125 MG/1; MG/1
1 TABLET, FILM COATED ORAL 2 TIMES DAILY
Qty: 14 TABLET | Refills: 0 | Status: SHIPPED | OUTPATIENT
Start: 2020-08-04 | End: 2020-08-11

## 2020-08-04 NOTE — PROGRESS NOTES
HPI:    Patient ID: Amairani Naranjo is a 54year old female. Exercise level: moderately active and has been following low salt diet. Weight has been stable.     Wt Readings from Last 3 Encounters:  08/04/20 : 229 lb 6.4 oz (104.1 kg)  06/26/20 : 230 l sinus pressure, sinus pain, sneezing, sore throat, tinnitus, trouble swallowing and voice change. Eyes: Negative for photophobia, pain, discharge, redness, itching and visual disturbance.    Respiratory: Negative for apnea, cough, choking, chest tightnes ABLATION(CPT=36478) Bilateral 2019   • LASIK Bilateral 2012   • MIRENA, IUD  02/13/2019   • TONSILLECTOMY  1984      Family History   Problem Relation Age of Onset   • Glaucoma Maternal Grandmother    • Heart Disorder Maternal Grandmother    • Colon Cancer hearing is noted. Left Ear: Tympanic membrane and ear canal normal. No lacerations. There is swelling. No drainage or tenderness. No foreign bodies. No mastoid tenderness.  Tympanic membrane is not injected, not scarred, not perforated, not erythematous, tenderness. Abdominal: Soft. There is no tenderness. Musculoskeletal:         General: No edema. Lymphadenopathy:        Head (right side): No submental, no submandibular, no preauricular, no posterior auricular and no occipital adenopathy present. mouth 2 (two) times daily for 7 days. Imaging & Referrals:  None      Copy of mammogram from cancer centers . From 2-20.

## 2020-08-04 NOTE — PATIENT INSTRUCTIONS
ASSESSMENT/PLAN:   Iron deficiency anemia due to chronic blood loss  (primary encounter diagnosis) Check blood 12-20.      Malignant neoplasm of upper-outer quadrant of left breast in female, estrogen receptor positive (hcc) Check records of mammogram.

## 2020-08-07 ENCOUNTER — OFFICE VISIT (OUTPATIENT)
Dept: OPTOMETRY | Facility: CLINIC | Age: 45
End: 2020-08-07
Payer: COMMERCIAL

## 2020-08-07 DIAGNOSIS — H02.883 MEIBOMIAN GLAND DYSFUNCTION (MGD) OF BOTH EYES: Primary | ICD-10-CM

## 2020-08-07 DIAGNOSIS — H02.886 MEIBOMIAN GLAND DYSFUNCTION (MGD) OF BOTH EYES: Primary | ICD-10-CM

## 2020-08-07 DIAGNOSIS — H52.4 ASTIGMATISM WITH PRESBYOPIA, BILATERAL: ICD-10-CM

## 2020-08-07 DIAGNOSIS — H52.203 ASTIGMATISM WITH PRESBYOPIA, BILATERAL: ICD-10-CM

## 2020-08-07 PROCEDURE — 92012 INTRM OPH EXAM EST PATIENT: CPT | Performed by: OPTOMETRIST

## 2020-08-07 NOTE — PROGRESS NOTES
Zenon West is a 54year old female. HPI:     HPI     Patient is in for an annual eye exam. Feels that she needs more power in her progressives. She is on new medications for cancer treatment and wanted to know if they could affect her eyes.     Last Cream Apply 1 Application topically 2 (two) times daily. 45 g 1   • anastrozole 1 MG Oral Tab tab TK 1 T PO ONE TIME D     • ibuprofen 600 MG Oral Tab Take 600 mg by mouth every 6 (six) hours as needed for Pain.          Allergies:  No Known Allergies    RO Sphere Cylinder Axis Dist VA Add    Right +0.50 -1.00 010 20/20 +1.50    Left +0.75 -1.25 005 20/20 +1.50          Final Rx       Sphere Cylinder Axis Dist VA Add    Right +0.50 -1.00 010 20/20 +1.50    Left +0.75 -1.25 005 20/20 +1.50    Type:  Progressi

## 2020-08-07 NOTE — PATIENT INSTRUCTIONS
Astigmatism with presbyopia, bilateral  Copy of current RX given. Meibomian gland dysfunction (MGD) of both eyes  I instructed patient to use warm compresses to keep their lids and lashes clear of oil and debris.

## 2021-03-29 ENCOUNTER — IMMUNIZATION (OUTPATIENT)
Dept: LAB | Facility: HOSPITAL | Age: 46
End: 2021-03-29
Attending: HOSPITALIST
Payer: COMMERCIAL

## 2021-03-29 DIAGNOSIS — Z23 NEED FOR VACCINATION: Primary | ICD-10-CM

## 2021-03-29 PROCEDURE — 0011A SARSCOV2 VAC 100MCG/0.5ML IM: CPT

## 2021-04-26 ENCOUNTER — IMMUNIZATION (OUTPATIENT)
Dept: LAB | Facility: HOSPITAL | Age: 46
End: 2021-04-26
Attending: EMERGENCY MEDICINE
Payer: COMMERCIAL

## 2021-04-26 DIAGNOSIS — Z23 NEED FOR VACCINATION: Primary | ICD-10-CM

## 2021-04-26 PROCEDURE — 0012A SARSCOV2 VAC 100MCG/0.5ML IM: CPT

## 2021-10-30 ENCOUNTER — E-VISIT (OUTPATIENT)
Dept: TELEHEALTH | Age: 46
End: 2021-10-30

## 2021-10-30 DIAGNOSIS — H10.30 ACUTE BACTERIAL CONJUNCTIVITIS, UNSPECIFIED LATERALITY: Primary | ICD-10-CM

## 2021-10-30 PROCEDURE — 99421 OL DIG E/M SVC 5-10 MIN: CPT | Performed by: NURSE PRACTITIONER

## 2021-10-31 RX ORDER — POLYMYXIN B SULFATE AND TRIMETHOPRIM 1; 10000 MG/ML; [USP'U]/ML
1 SOLUTION OPHTHALMIC EVERY 4 HOURS
Qty: 1 EACH | Refills: 0 | Status: SHIPPED | OUTPATIENT
Start: 2021-10-31 | End: 2021-11-03

## 2021-10-31 NOTE — PROGRESS NOTES
Patient requested an E-Visit. After reviewing history, symptoms and issuing a prescription (9) minutes of time was accrued. Please see E-Visit for further information.

## 2021-10-31 NOTE — PATIENT INSTRUCTIONS
Bacterial Conjunctivitis    You have an infection in the membranes covering the white part of the eye. This part of the eye is called the conjunctiva. The infection is called conjunctivitis.  The most common symptoms of conjunctivitis include a thick, pus educational content on 4/1/2020  © 6710-6155 [employer]. All rights reserved. This information is not intended as a substitute for professional medical care. Always follow your healthcare professional's instructions.

## 2021-11-02 ENCOUNTER — NURSE TRIAGE (OUTPATIENT)
Dept: INTERNAL MEDICINE CLINIC | Facility: CLINIC | Age: 46
End: 2021-11-02

## 2021-11-02 ENCOUNTER — TELEPHONE (OUTPATIENT)
Dept: OPHTHALMOLOGY | Facility: CLINIC | Age: 46
End: 2021-11-02

## 2021-11-02 NOTE — TELEPHONE ENCOUNTER
Spoke to patient. States she received a voicemail from ophthalmology department. Patient tried to call ophthalmology back and office was closed. Patient will try to call again in the morning. Patient was also advised ER and verbalized understanding.

## 2021-11-02 NOTE — TELEPHONE ENCOUNTER
LMTCB regarding nurse triage phone call, see below:        EMILIE Shine    11/2/21 2:44 PM  Note  Action Requested: Summary for Provider      []? Critical Lab, Recommendations Needed  []? Need Additional Advice  []? FYI    []?    Need O

## 2021-11-02 NOTE — TELEPHONE ENCOUNTER
Action Requested: Summary for Provider     []  Critical Lab, Recommendations Needed  [] Need Additional Advice  []   FYI    []   Need Orders  [] Need Medications Sent to Pharmacy  []  Other     SUMMARY: left eye felt \"off\" Thursday, like something was in

## 2021-11-03 ENCOUNTER — OFFICE VISIT (OUTPATIENT)
Dept: OPHTHALMOLOGY | Facility: CLINIC | Age: 46
End: 2021-11-03
Payer: COMMERCIAL

## 2021-11-03 DIAGNOSIS — B30.9 VIRAL CONJUNCTIVITIS OF BOTH EYES: Primary | ICD-10-CM

## 2021-11-03 PROCEDURE — 99214 OFFICE O/P EST MOD 30 MIN: CPT | Performed by: OPHTHALMOLOGY

## 2021-11-03 RX ORDER — PREDNISOLONE ACETATE 10 MG/ML
SUSPENSION/ DROPS OPHTHALMIC
Qty: 1 EACH | Refills: 0 | Status: SHIPPED | OUTPATIENT
Start: 2021-11-03 | End: 2021-11-09

## 2021-11-03 NOTE — ASSESSMENT & PLAN NOTE
Stop Polymixin drops. Use warm compresses for discharge and crusting. Use cool compresses as needed for swelling and comfort. Start Pred forte drops:   Use one drop 4 times a day in both eyes for one week, then discontinue. Warned of contagion.   Riya Rodriguez

## 2021-11-03 NOTE — PATIENT INSTRUCTIONS
Viral conjunctivitis of both eyes  Stop Polymixin drops. Use warm compresses for discharge and crusting. Use cool compresses as needed for swelling and comfort.   Start Pred forte drops:   Use one drop 4 times a day in both eyes for one week, then disco

## 2021-11-03 NOTE — TELEPHONE ENCOUNTER
Conversation: Acute  (Newest Message First)  November 3, 2021    Lilia Cain    8:45 AM  Note  Start of symptoms: 10/28/21   Which eye?   Left eye  Redness- red  Discharge - no, tearing eye  Pain- did on 10/30/21, but okay  Photophobia- yes  Blurred

## 2021-11-03 NOTE — TELEPHONE ENCOUNTER
Start of symptoms: 10/28/21   Which eye? Left eye  Redness- red  Discharge - no, tearing eye  Pain- did on 10/30/21, but okay  Photophobia- yes  Blurred vision? Distorted vision   Contacts? no  Recent cold? No   Recent contact with conjunctivitis?  No   It

## 2021-11-03 NOTE — PROGRESS NOTES
Evie Hamilton is a 51year old female. HPI:     HPI     Consult     Comments: Consult per Dr. Peterson Ledezma patient here for an urgent visit per Dr. Maira Silva.   She complains of redness, and discomfort in the left eye since 10/30/ Medications   Medication Sig Dispense Refill   • prednisoLONE 1 % Ophthalmic Suspension Use 1 drop 4 times a day in both eyes for 7 days, then discontinue 1 each 0   • folic acid 1 MG Oral Tab      • Leuprolide Acetate, 3 Month, (LUPRON DEPOT, 3-MONTH, IM) Wearing Rx       Sphere Cylinder Axis Add    Right -0.50 +1.00 106 +1.50    Left -0.50 +1.25 091 +1.50    Type: Progressive bifocal                 ASSESSMENT/PLAN:     Diagnoses and Plan:     Viral conjunctivitis of both eyes  Stop Polymixin drops.     Use

## 2021-11-08 ENCOUNTER — TELEPHONE (OUTPATIENT)
Dept: OPHTHALMOLOGY | Facility: CLINIC | Age: 46
End: 2021-11-08

## 2021-11-08 NOTE — TELEPHONE ENCOUNTER
Spoke to pt and asked what the issue was and pt states she saw [facility] on 11/3/21 and was prescribed Pred Forte drops for viral conjunctivitis and pt states the drops helped clear the redness, pain and swelling but pt feels vision is \"off\".  Pt states she can

## 2021-11-09 ENCOUNTER — OFFICE VISIT (OUTPATIENT)
Dept: OPHTHALMOLOGY | Facility: CLINIC | Age: 46
End: 2021-11-09
Payer: COMMERCIAL

## 2021-11-09 DIAGNOSIS — H52.4 ASTIGMATISM WITH PRESBYOPIA, BILATERAL: ICD-10-CM

## 2021-11-09 DIAGNOSIS — H52.203 ASTIGMATISM WITH PRESBYOPIA, BILATERAL: ICD-10-CM

## 2021-11-09 DIAGNOSIS — B30.9 VIRAL CONJUNCTIVITIS OF BOTH EYES: Primary | ICD-10-CM

## 2021-11-09 PROCEDURE — 99213 OFFICE O/P EST LOW 20 MIN: CPT | Performed by: OPHTHALMOLOGY

## 2021-11-09 PROCEDURE — 92015 DETERMINE REFRACTIVE STATE: CPT | Performed by: OPHTHALMOLOGY

## 2021-11-09 RX ORDER — PREDNISOLONE ACETATE 10 MG/ML
SUSPENSION/ DROPS OPHTHALMIC
Qty: 1 EACH | Refills: 0 | Status: SHIPPED | OUTPATIENT
Start: 2021-11-09

## 2021-11-09 NOTE — PROGRESS NOTES
Alfredo Dobbins is a 51year old female. HPI:     HPI     Patient is here for an urgent visit. She is using Pred Forte OU QID as directed for viral conjunctivitis OU. She says pain, redness, swelling and tearing OU have resolved.     She complains that ONE TIME D     • Neomycin-Polymyxin-HC 1 % Otic Solution 1 gtt qid X 5 days. 1 Bottle 0   • betamethasone dipropionate 0.05 % External Cream Apply 1 Application topically 2 (two) times daily.  45 g 1   • ibuprofen 600 MG Oral Tab Take 600 mg by mouth every Right -0.75 +0.75 105 20/20 +1.75 20/20    Left -0.75 +1.25 090 20/20 +1.75 20/20    Type: Progressive bifocal                 ASSESSMENT/PLAN:     Diagnoses and Plan:     Viral conjunctivitis of both eyes  Resolved. Stop Pred forte in the right eye.

## 2021-11-09 NOTE — PATIENT INSTRUCTIONS
Viral conjunctivitis of both eyes  Resolved. Stop Pred forte in the right eye. Continue Pred forte 2-3 times per day in the left eye (up to 4 times a day maximally) ; continue Pred forte 2-3 times a day in the left eye until next visit in 1 month.

## 2021-11-09 NOTE — ASSESSMENT & PLAN NOTE
Resolved. Stop Pred forte in the right eye. Continue Pred forte 2-3 times per day in the left eye (up to 4 times a day maximally) ; continue Pred forte 2-3 times a day in the left eye until next visit in 1 month.       Patient told she can use the Pred f

## 2021-12-07 ENCOUNTER — TELEPHONE (OUTPATIENT)
Dept: OPHTHALMOLOGY | Facility: CLINIC | Age: 46
End: 2021-12-07

## 2021-12-07 NOTE — TELEPHONE ENCOUNTER
Patient says she canceled today's appointment due to work, but she says that her left eye is 100% back to normal.  She weaned off of Pred forte in the left eye as directed and stopped them about 1 week ago.   I told her that I will let Dr. Emerita Arce know her

## 2023-08-13 NOTE — TELEPHONE ENCOUNTER
Called to confirm pt to have stereotactic  Under ernie biopsy  Tuesday April 9 2019 check in at 1045 am . Kaela informed to change appt to April 9 Health Maintenance Due   Topic Date Due   • COVID-19 Vaccine (1) Never done   • DTaP/Tdap/Td Vaccine (1 - Tdap) Never done   • Shingles Vaccine (1 of 2) Never done   • Hepatitis C Screening  Never done   • Breast Cancer Screening  05/11/2020   • Cervical Cancer Screen 30-64 -  10/04/2021       Patient is due for topics as listed above but is not proceeding with Immunization(s) COVID-19, Dtap/Tdap/Td and Shingles at this time. Patient declined Mammogram.   29-year-old female, presents with acute onset of right flank pain rating to the right lower quadrant today.  With severe.  Patient nontoxic-appearing, vital signs stable, afebrile.  On exam no abdominal tenderness with mild right CVA tenderness.  Labs grossly unremarkable.  Urinalysis shows large amount of blood.  The patient is LMP 1 week ago so unlikely to be her menstruation.  No signs of infection on the urine.  CT shows no obstructive stone and no signs of appendicitis.  Ultrasound shows a right adnexal cyst without signs of torsion.  Clinical picture of low suspicion for PID.  History and exam suggestive most likely recently passed stone.  On reexam patient feels significant improvement of pain.  Will discharge with PMD follow-up in 2 to 3 days and ibuprofen as needed.  Discussed red flags to come back to the hospital.

## 2023-11-30 LAB
AMB EXT BILIRUBIN, TOTAL: 0.4 MG/DL
AMB EXT BUN: 13 MG/DL
AMB EXT CALCIUM: 6.7
AMB EXT CARBON DIOXIDE: 19
AMB EXT CHLORIDE: 108
AMB EXT CHOL/HDL RATIO: 5.1
AMB EXT CHOLESTEROL, TOTAL: 230 MG/DL
AMB EXT CMP ALT: 28 U/L
AMB EXT CMP AST: 19 U/L
AMB EXT CREATININE: 0.88 MG/DL
AMB EXT EGFR NON-AA: 81
AMB EXT GLUCOSE: 98 MG/DL
AMB EXT HDL CHOLESTEROL: 45 MG/DL
AMB EXT HEMATOCRIT: 40.7
AMB EXT HEMOGLOBIN: 13.6
AMB EXT LDL CHOLESTEROL, DIRECT: 161 MG/DL
AMB EXT MCV: 93
AMB EXT PLATELETS: 186
AMB EXT POSTASSIUM: 4.3 MMOL/L
AMB EXT SODIUM: 141 MMOL/L
AMB EXT TOTAL PROTEIN: 6.7
AMB EXT TRIGLYCERIDES: 1 MG/DL
AMB EXT TSH: 1.74 MIU/ML
AMB EXT WBC: 4.4 X10(3)UL

## 2023-12-01 ENCOUNTER — MED REC SCAN ONLY (OUTPATIENT)
Dept: INTERNAL MEDICINE CLINIC | Facility: CLINIC | Age: 48
End: 2023-12-01

## 2023-12-02 ENCOUNTER — TELEPHONE (OUTPATIENT)
Dept: INTERNAL MEDICINE CLINIC | Facility: CLINIC | Age: 48
End: 2023-12-02

## 2023-12-02 DIAGNOSIS — E78.00 HIGH CHOLESTEROL: Primary | ICD-10-CM

## 2023-12-02 NOTE — TELEPHONE ENCOUNTER
Received labs from health physical Sugar is normal kidney and liver functions look okay. Electrolytes look okay. White blood cells and cell counts look okay. Thyroid looks okay. However cholesterol is elevated. Total cholesterol 230 should be less than 200. HDL is low which is score \"the good cholesterol should be above 50. HDL improves with cardiovascular sustained aerobic exercise at least 30 minutes 3-4 times a week. LDL is very high at 161 normal should be less than 100. Triglycerides are good at 130. Normal should be less than 150. Would recommend a cholesterol-lowering medicine i.e. atorvastatin 20 mg at night. Recheck lipid in 3 months. Orders written. Not sure if she wants me to send atorvastatin to the pharmacy?

## 2023-12-04 NOTE — TELEPHONE ENCOUNTER
Okay to do blood work in April. Before the appointment if she wants that we will have the results back. Orders written.

## 2023-12-04 NOTE — TELEPHONE ENCOUNTER
LAST VISIT with Dr Alessandra Alvares  8/4/2020. Informed of Dr Alberto Torres note below, patient prefers to do DIET, EXERCISE and lose weight , instead of the cholesterol medication. She also asked if she could do the repeat blood test on her office visit next year , which she is scheduled on 4/1/24.            Future Appointments   Date Time Provider Chasidy Eedn   4/1/2024  2:00 PM Donte Zepeda MD [id number] [place] [id number]

## 2024-02-17 ENCOUNTER — TELEPHONE (OUTPATIENT)
Dept: INTERNAL MEDICINE CLINIC | Facility: CLINIC | Age: 49
End: 2024-02-17

## 2024-02-17 ENCOUNTER — OFFICE VISIT (OUTPATIENT)
Dept: INTERNAL MEDICINE CLINIC | Facility: CLINIC | Age: 49
End: 2024-02-17

## 2024-02-17 VITALS
TEMPERATURE: 98 F | HEIGHT: 69 IN | DIASTOLIC BLOOD PRESSURE: 77 MMHG | BODY MASS INDEX: 36.68 KG/M2 | SYSTOLIC BLOOD PRESSURE: 125 MMHG | WEIGHT: 247.63 LBS | HEART RATE: 61 BPM

## 2024-02-17 DIAGNOSIS — R63.4 WEIGHT LOSS: ICD-10-CM

## 2024-02-17 DIAGNOSIS — M79.641 BILATERAL HAND PAIN: ICD-10-CM

## 2024-02-17 DIAGNOSIS — H52.203 ASTIGMATISM WITH PRESBYOPIA, BILATERAL: ICD-10-CM

## 2024-02-17 DIAGNOSIS — M79.642 BILATERAL HAND PAIN: ICD-10-CM

## 2024-02-17 DIAGNOSIS — Z00.00 ADULT GENERAL MEDICAL EXAMINATION: ICD-10-CM

## 2024-02-17 DIAGNOSIS — H52.13 MYOPIA OF BOTH EYES WITH ASTIGMATISM AND PRESBYOPIA: ICD-10-CM

## 2024-02-17 DIAGNOSIS — H93.8X3 CRACKLING SOUND IN BOTH EARS: ICD-10-CM

## 2024-02-17 DIAGNOSIS — Z17.0 MALIGNANT NEOPLASM OF UPPER-OUTER QUADRANT OF LEFT BREAST IN FEMALE, ESTROGEN RECEPTOR POSITIVE  (HCC): ICD-10-CM

## 2024-02-17 DIAGNOSIS — H02.886 MEIBOMIAN GLAND DYSFUNCTION (MGD) OF BOTH EYES: ICD-10-CM

## 2024-02-17 DIAGNOSIS — E78.00 HIGH CHOLESTEROL: ICD-10-CM

## 2024-02-17 DIAGNOSIS — Z12.4 PAP SMEAR FOR CERVICAL CANCER SCREENING: ICD-10-CM

## 2024-02-17 DIAGNOSIS — C50.412 MALIGNANT NEOPLASM OF UPPER-OUTER QUADRANT OF LEFT BREAST IN FEMALE, ESTROGEN RECEPTOR POSITIVE  (HCC): ICD-10-CM

## 2024-02-17 DIAGNOSIS — D50.0 IRON DEFICIENCY ANEMIA DUE TO CHRONIC BLOOD LOSS: ICD-10-CM

## 2024-02-17 DIAGNOSIS — Z12.11 COLON CANCER SCREENING: Primary | ICD-10-CM

## 2024-02-17 DIAGNOSIS — H52.203 MYOPIA OF BOTH EYES WITH ASTIGMATISM AND PRESBYOPIA: ICD-10-CM

## 2024-02-17 DIAGNOSIS — H43.393 FLOATER, VITREOUS, BILATERAL: ICD-10-CM

## 2024-02-17 DIAGNOSIS — H52.4 MYOPIA OF BOTH EYES WITH ASTIGMATISM AND PRESBYOPIA: ICD-10-CM

## 2024-02-17 DIAGNOSIS — H02.883 MEIBOMIAN GLAND DYSFUNCTION (MGD) OF BOTH EYES: ICD-10-CM

## 2024-02-17 DIAGNOSIS — H52.4 ASTIGMATISM WITH PRESBYOPIA, BILATERAL: ICD-10-CM

## 2024-02-17 DIAGNOSIS — I83.11 VARICOSE VEINS OF RIGHT LOWER EXTREMITY WITH INFLAMMATION: ICD-10-CM

## 2024-02-17 PROBLEM — B30.9 VIRAL CONJUNCTIVITIS OF BOTH EYES: Status: RESOLVED | Noted: 2021-11-03 | Resolved: 2024-02-17

## 2024-02-17 LAB
APPEARANCE: CLEAR
BILIRUBIN: NEGATIVE
CRP SERPL-MCNC: 0.4 MG/DL (ref ?–1)
ERYTHROCYTE [SEDIMENTATION RATE] IN BLOOD: 35 MM/HR
GLUCOSE (URINE DIPSTICK): NEGATIVE MG/DL
KETONES (URINE DIPSTICK): NEGATIVE MG/DL
MULTISTIX LOT#: ABNORMAL NUMERIC
NITRITE, URINE: NEGATIVE
OCCULT BLOOD: NEGATIVE
PH, URINE: 5.5 (ref 4.5–8)
PROTEIN (URINE DIPSTICK): NEGATIVE MG/DL
RHEUMATOID FACT SERPL-ACNC: <10 IU/ML (ref ?–14)
SPECIFIC GRAVITY: 1.03 (ref 1–1.03)
URINE-COLOR: YELLOW
UROBILINOGEN,SEMI-QN: 0.2 MG/DL (ref 0–1.9)

## 2024-02-17 PROCEDURE — 99214 OFFICE O/P EST MOD 30 MIN: CPT | Performed by: INTERNAL MEDICINE

## 2024-02-17 PROCEDURE — 90677 PCV20 VACCINE IM: CPT | Performed by: INTERNAL MEDICINE

## 2024-02-17 PROCEDURE — 90471 IMMUNIZATION ADMIN: CPT | Performed by: INTERNAL MEDICINE

## 2024-02-17 PROCEDURE — 81003 URINALYSIS AUTO W/O SCOPE: CPT | Performed by: INTERNAL MEDICINE

## 2024-02-17 PROCEDURE — 99396 PREV VISIT EST AGE 40-64: CPT | Performed by: INTERNAL MEDICINE

## 2024-02-17 RX ORDER — TOPIRAMATE 25 MG/1
50 TABLET ORAL EVERY EVENING
Qty: 60 TABLET | Refills: 3 | Status: SHIPPED | OUTPATIENT
Start: 2024-02-17

## 2024-02-17 RX ORDER — AMOXICILLIN 500 MG/1
500 TABLET, FILM COATED ORAL 2 TIMES DAILY
COMMUNITY
Start: 2024-02-05

## 2024-02-17 NOTE — PROGRESS NOTES
HPI:    Patient ID: Karla Du is a 49 year old female.    Karla Du is a 49 year old female who presents for a complete physical exam.   HPI:     Chief Complaint   Patient presents with    Physical     Patient states she is here for Annual Physical Examination.         No LMP recorded. (Menstrual status: Other).  No more.  Still vaginal bleeding since diagnosed with breast cancer and started on medications.     /77 (BP Location: Right arm, Patient Position: Sitting, Cuff Size: adult)   Pulse 61   Temp 98.4 °F (36.9 °C) (Oral)   Ht 5' 9\" (1.753 m)   Wt 247 lb 9.6 oz (112.3 kg)   BMI 36.56 kg/m²    Wt Readings from Last 4 Encounters:   02/17/24 247 lb 9.6 oz (112.3 kg)   08/04/20 229 lb 6.4 oz (104.1 kg)   06/26/20 230 lb 6.4 oz (104.5 kg)   03/12/20 230 lb (104.3 kg)     Body mass index is 36.56 kg/m².     Labs:   Lab Results   Component Value Date/Time    WBC 4.4 11/30/2023 12:00 AM    HGB 13.6 11/30/2023 12:00 AM     11/30/2023 12:00 AM      Lab Results   Component Value Date/Time    GLU 98 11/30/2023 12:00 AM     06/26/2020 11:28 AM    K 4.3 11/30/2023 12:00 AM     11/30/2023 12:00 AM    CO2 19 11/30/2023 12:00 AM    CREATSERUM 0.88 11/30/2023 12:00 AM    CA 6.7 11/30/2023 12:00 AM    ALB 3.5 06/26/2020 11:28 AM    TP 6.7 11/30/2023 12:00 AM    ALKPHO 96 06/26/2020 11:28 AM    AST 19 11/30/2023 12:00 AM    ALT 28 11/30/2023 12:00 AM    BILT 0.4 11/30/2023 12:00 AM    TSH 1.740 11/30/2023 12:00 AM        Lab Results   Component Value Date/Time    CHOLEST 230 11/30/2023 12:00 AM    HDL 45 11/30/2023 12:00 AM    TRIG 1 11/30/2023 12:00 AM     (H) 06/26/2020 11:28 AM    NONHDLC 155 (H) 06/26/2020 11:28 AM       No results found for: \"A1C\"   No results found for: \"VITD\"      Health Maintenance   Topic Date Due    Mammogram  03/06/2020       Current Outpatient Medications   Medication Sig Dispense Refill    amoxicillin 500 MG Oral Tab Take 1 tablet (500 mg total) by mouth  2 (two) times daily.      topiramate 25 MG Oral Tab Take 2 tablets (50 mg total) by mouth every evening. 60 tablet 3    folic acid 1 MG Oral Tab       Leuprolide Acetate, 3 Month, (LUPRON DEPOT, 3-MONTH, IM) Inject into the muscle.      anastrozole 1 MG Oral Tab tab TK 1 T PO ONE TIME D        Past Medical History:   Diagnosis Date    Viral conjunctivitis of both eyes 11/3/2021      Past Surgical History:   Procedure Laterality Date    BIOPSY OF BREAST, NEEDLE CORE Left 2019    COLONOSCOPY  2009    IR VARICOSE VEIN ENDOVENOUS LASER ABLATION(CPT=36478) Bilateral 2019    LASIK Bilateral     MIRENA, IUD  2019    TONSILLECTOMY  1984      Family History   Problem Relation Age of Onset    Glaucoma Maternal Grandmother     Heart Disorder Maternal Grandmother     Colon Cancer Maternal Grandmother 65    Other (apnea) Brother     Other (Other) Father         unsure if cancer.    Prostate Cancer Paternal Grandfather     Diabetes Neg     Macular degeneration Neg       Social History:  Social History     Socioeconomic History    Marital status:     Number of children: 1   Occupational History    Occupation:    Tobacco Use    Smoking status: Never    Smokeless tobacco: Never   Substance and Sexual Activity    Alcohol use: Yes     Alcohol/week: 8.0 standard drinks of alcohol     Types: 6 Cans of beer, 2 Glasses of wine per week    Drug use: No    Sexual activity: Yes     Partners: Male         OB History    Para Term  AB Living   1 1 1 0 0 1   SAB IAB Ectopic Multiple Live Births   0 0 0 0 1        Hx of Pap: all Paps normal           Labs:   Lab Results   Component Value Date/Time    GLU 98 2023 12:00 AM     2020 11:28 AM    K 4.3 2023 12:00 AM     2023 12:00 AM    CO2 19 2023 12:00 AM    CREATSERUM 0.88 2023 12:00 AM    CA 6.7 2023 12:00 AM    AST 19 2023 12:00 AM    ALT 28 2023 12:00 AM    TSH 1.740 2023 12:00  AM        Lab Results   Component Value Date/Time    CHOLEST 230 11/30/2023 12:00 AM    HDL 45 11/30/2023 12:00 AM    TRIG 1 11/30/2023 12:00 AM     (H) 06/26/2020 11:28 AM    NONHDLC 155 (H) 06/26/2020 11:28 AM           Saw dermatologist in 2020 get a full skin exam and nothing concerning.  Has not noticed any changes with months.    Saw eye doctor recently.  Check pressure everything.    Was exercising an hour 3 times a week and eating very healthy.  Eats clean.  Does not eat out much.  Cooks mostly.  Yet was not losing much weight.  Did notice leg muscles are Tomur but close are tighter.  Frustrated at not being able to lose weight.  Wants some help..    Finger Pain   The pain is present in the left fingers and right fingers. This is a new problem. The current episode started 1 to 4 weeks ago. There has been no history of extremity trauma. The problem occurs constantly. The problem has been rapidly improving. The quality of the pain is described as aching. The pain is at a severity of 5/10. The pain is moderate. Associated symptoms include an inability to bear weight, joint locking, joint swelling, a limited range of motion and stiffness. Pertinent negatives include no fever, itching, numbness or tingling. The symptoms are aggravated by activity. She has tried nothing for the symptoms. Family history does not include gout or rheumatoid arthritis. Her past medical history is significant for osteoarthritis. There is no history of diabetes, gout or rheumatoid arthritis.   Ear Problem   This is a chronic (Not sure which ear.  Last was 2 months ago.  Off-and-on episodes where hears crackling in the ear.  Saw ear nose and throat in the past and everything was normal per patient.) problem. The current episode started more than 1 year ago. The problem has been waxing and waning. There has been no fever. The pain is at a severity of 0/10. The patient is experiencing no pain. Pertinent negatives include no  abdominal pain, coughing, diarrhea, ear discharge, headaches, hearing loss, neck pain, rash, rhinorrhea, sore throat or vomiting. She has tried nothing for the symptoms.         Review of Systems   Constitutional:  Positive for unexpected weight change. Negative for activity change, appetite change, chills, diaphoresis, fatigue and fever.   HENT:  Negative for congestion, dental problem, drooling, ear discharge, ear pain, facial swelling, hearing loss, mouth sores, nosebleeds, postnasal drip, rhinorrhea, sinus pressure, sinus pain, sneezing, sore throat, tinnitus, trouble swallowing and voice change.    Eyes:  Negative for photophobia, pain, discharge, redness, itching and visual disturbance.   Respiratory:  Negative for apnea, cough, choking, chest tightness, shortness of breath, wheezing and stridor.    Cardiovascular:  Negative for chest pain, palpitations and leg swelling.   Gastrointestinal:  Negative for abdominal distention, abdominal pain, anal bleeding, blood in stool, constipation, diarrhea, nausea, rectal pain and vomiting.   Endocrine: Negative for cold intolerance, heat intolerance, polydipsia, polyphagia and polyuria.   Genitourinary:  Negative for decreased urine volume, difficulty urinating, dysuria, flank pain, frequency, hematuria, menstrual problem, pelvic pain, urgency, vaginal bleeding, vaginal discharge and vaginal pain.   Musculoskeletal:  Positive for arthralgias, joint swelling and stiffness. Negative for gout and neck pain.   Skin:  Negative for itching and rash.   Neurological:  Negative for dizziness, tingling, tremors, seizures, syncope, facial asymmetry, speech difficulty, weakness, light-headedness, numbness and headaches.   Psychiatric/Behavioral:  Negative for agitation, behavioral problems, confusion, decreased concentration, dysphoric mood, hallucinations, self-injury, sleep disturbance and suicidal ideas. The patient is not nervous/anxious and is not hyperactive.    All other  systems reviewed and are negative.        Current Outpatient Medications   Medication Sig Dispense Refill    amoxicillin 500 MG Oral Tab Take 1 tablet (500 mg total) by mouth 2 (two) times daily.      topiramate 25 MG Oral Tab Take 2 tablets (50 mg total) by mouth every evening. 60 tablet 3    folic acid 1 MG Oral Tab       Leuprolide Acetate, 3 Month, (LUPRON DEPOT, 3-MONTH, IM) Inject into the muscle.      anastrozole 1 MG Oral Tab tab TK 1 T PO ONE TIME D       Allergies:No Known Allergies    HISTORY:  Past Medical History:   Diagnosis Date    Viral conjunctivitis of both eyes 11/3/2021      Past Surgical History:   Procedure Laterality Date    BIOPSY OF BREAST, NEEDLE CORE Left 04/19/2019    COLONOSCOPY  2009    IR VARICOSE VEIN ENDOVENOUS LASER ABLATION(CPT=36478) Bilateral 2019    LASIK Bilateral 2012    MIRENA, IUD  02/13/2019    TONSILLECTOMY  1984      Family History   Problem Relation Age of Onset    Glaucoma Maternal Grandmother     Heart Disorder Maternal Grandmother     Colon Cancer Maternal Grandmother 65    Other (apnea) Brother     Other (Other) Father         unsure if cancer.    Prostate Cancer Paternal Grandfather     Diabetes Neg     Macular degeneration Neg       Social History:   Social History     Socioeconomic History    Marital status:     Number of children: 1   Occupational History    Occupation:    Tobacco Use    Smoking status: Never    Smokeless tobacco: Never   Substance and Sexual Activity    Alcohol use: Yes     Alcohol/week: 8.0 standard drinks of alcohol     Types: 6 Cans of beer, 2 Glasses of wine per week    Drug use: No    Sexual activity: Yes     Partners: Male        Exercise level: exercises 1 times a  week (was working out with [email or web address] and cut back recently) and has been following low salt diet.  Weight has been up. Cooks more. Eatting . Eats later in PM. Gets home from gym and eats afterwards and bed at 10 PM. No sweets but occ. chips.   Wt  Readings from Last 3 Encounters:   02/17/24 247 lb 9.6 oz (112.3 kg)   08/04/20 229 lb 6.4 oz (104.1 kg)   06/26/20 230 lb 6.4 oz (104.5 kg)     BP Readings from Last 3 Encounters:   02/17/24 125/77   08/04/20 92/62   06/26/20 109/68       Labs:   Lab Results   Component Value Date/Time    GLU 98 11/30/2023 12:00 AM     06/26/2020 11:28 AM    K 4.3 11/30/2023 12:00 AM     11/30/2023 12:00 AM    CO2 19 11/30/2023 12:00 AM    CREATSERUM 0.88 11/30/2023 12:00 AM    CA 6.7 11/30/2023 12:00 AM    AST 19 11/30/2023 12:00 AM    ALT 28 11/30/2023 12:00 AM    TSH 1.740 11/30/2023 12:00 AM        Lab Results   Component Value Date/Time    CHOLEST 230 11/30/2023 12:00 AM    HDL 45 11/30/2023 12:00 AM    TRIG 1 11/30/2023 12:00 AM     (H) 06/26/2020 11:28 AM    NONHDLC 155 (H) 06/26/2020 11:28 AM          PHYSICAL EXAM:   /77 (BP Location: Right arm, Patient Position: Sitting, Cuff Size: adult)   Pulse 61   Temp 98.4 °F (36.9 °C) (Oral)   Ht 5' 9\" (1.753 m)   Wt 247 lb 9.6 oz (112.3 kg)   BMI 36.56 kg/m²   BP Readings from Last 3 Encounters:   02/17/24 125/77   08/04/20 92/62   06/26/20 109/68     Wt Readings from Last 3 Encounters:   02/17/24 247 lb 9.6 oz (112.3 kg)   08/04/20 229 lb 6.4 oz (104.1 kg)   06/26/20 230 lb 6.4 oz (104.5 kg)       Physical Exam  Vitals and nursing note reviewed.   Constitutional:       General: She is not in acute distress.     Appearance: Normal appearance. She is well-developed and well-groomed. She is not ill-appearing, toxic-appearing or diaphoretic.      Interventions: She is not intubated.  HENT:      Head: Normocephalic and atraumatic.      Right Ear: Tympanic membrane, ear canal and external ear normal. No decreased hearing noted. No laceration, drainage, swelling or tenderness. No middle ear effusion. There is no impacted cerumen. No foreign body. No mastoid tenderness. No PE tube. No hemotympanum. Tympanic membrane is not injected, scarred, perforated,  erythematous, retracted or bulging. Tympanic membrane has normal mobility.      Left Ear: Tympanic membrane, ear canal and external ear normal. No decreased hearing noted. No laceration, drainage, swelling or tenderness.  No middle ear effusion. There is no impacted cerumen. No foreign body. No mastoid tenderness. No PE tube. No hemotympanum. Tympanic membrane is not injected, scarred, perforated, erythematous, retracted or bulging. Tympanic membrane has normal mobility.      Nose:      Right Sinus: No maxillary sinus tenderness or frontal sinus tenderness.      Left Sinus: No maxillary sinus tenderness or frontal sinus tenderness.      Mouth/Throat:      Lips: Pink. No lesions.      Mouth: Mucous membranes are moist. No injury, lacerations, oral lesions or angioedema.      Dentition: Normal dentition. Does not have dentures. No dental tenderness, gingival swelling, dental abscesses or gum lesions.      Tongue: No lesions. Tongue does not deviate from midline.      Palate: No mass and lesions.      Pharynx: Oropharynx is clear. Uvula midline. No pharyngeal swelling, oropharyngeal exudate, posterior oropharyngeal erythema or uvula swelling.      Tonsils: No tonsillar exudate or tonsillar abscesses.   Eyes:      General: Lids are normal. No scleral icterus.        Right eye: No foreign body, discharge or hordeolum.         Left eye: No foreign body, discharge or hordeolum.      Extraocular Movements: Extraocular movements intact.      Right eye: Normal extraocular motion and no nystagmus.      Left eye: Normal extraocular motion and no nystagmus.      Conjunctiva/sclera: Conjunctivae normal.      Right eye: Right conjunctiva is not injected. No chemosis, exudate or hemorrhage.     Left eye: Left conjunctiva is not injected. No chemosis, exudate or hemorrhage.     Pupils: Pupils are equal, round, and reactive to light.   Neck:      Thyroid: No thyroid mass, thyromegaly or thyroid tenderness.      Vascular: Normal  carotid pulses. No carotid bruit, hepatojugular reflux or JVD.      Trachea: Trachea and phonation normal. No tracheal tenderness, tracheostomy, abnormal tracheal secretions or tracheal deviation.   Cardiovascular:      Rate and Rhythm: Normal rate and regular rhythm.      Pulses: Normal pulses.           Carotid pulses are 2+ on the right side and 2+ on the left side.       Radial pulses are 2+ on the right side and 2+ on the left side.        Dorsalis pedis pulses are 2+ on the right side and 2+ on the left side.        Posterior tibial pulses are 2+ on the right side and 2+ on the left side.      Heart sounds: Normal heart sounds, S1 normal and S2 normal.   Pulmonary:      Effort: Pulmonary effort is normal. No tachypnea, bradypnea, accessory muscle usage, prolonged expiration, respiratory distress or retractions. She is not intubated.      Breath sounds: Normal breath sounds and air entry. No stridor, decreased air movement or transmitted upper airway sounds. No decreased breath sounds, wheezing, rhonchi or rales.   Chest:      Chest wall: No tenderness.   Breasts:     Breasts are symmetrical.      Right: No swelling, bleeding, inverted nipple, mass, nipple discharge, skin change or tenderness.      Left: No swelling, bleeding, inverted nipple, mass, nipple discharge, skin change or tenderness.       Abdominal:      General: Bowel sounds are normal. There is no distension.      Palpations: Abdomen is soft. Abdomen is not rigid. There is no fluid wave, hepatomegaly, splenomegaly or mass.      Tenderness: There is no abdominal tenderness. There is no right CVA tenderness, left CVA tenderness, guarding or rebound.   Genitourinary:     Exam position: Supine.      Pubic Area: No rash.       Labia:         Right: No rash, tenderness, lesion or injury.         Left: No rash, tenderness, lesion or injury.       Urethra: No prolapse, urethral pain, urethral swelling or urethral lesion.      Vagina: Normal. No signs of  injury and foreign body. No vaginal discharge, erythema, tenderness, bleeding or lesions.      Cervix: No cervical motion tenderness, discharge, friability, lesion, erythema, cervical bleeding or eversion.      Uterus: Not deviated, not enlarged, not fixed, not tender and no uterine prolapse.       Adnexa:         Right: No mass, tenderness or fullness.          Left: No mass, tenderness or fullness.     Musculoskeletal:      Right wrist: No swelling, deformity, effusion, lacerations, tenderness, bony tenderness, snuff box tenderness or crepitus. Normal range of motion. Normal pulse.      Left wrist: No swelling, deformity, effusion, lacerations, tenderness, bony tenderness, snuff box tenderness or crepitus. Normal range of motion. Normal pulse.      Cervical back: Neck supple. No tenderness.      Right lower leg: No edema.      Left lower leg: No edema.      Comments: Positive Finkelstein sign bilateral thumbs.  Swelling of DIP bilateral thumbs.  No erythema no warmth.   Lymphadenopathy:      Head:      Right side of head: No submental, submandibular, tonsillar, preauricular, posterior auricular or occipital adenopathy.      Left side of head: No submental, submandibular, tonsillar, preauricular, posterior auricular or occipital adenopathy.      Cervical: No cervical adenopathy.      Right cervical: No superficial, deep or posterior cervical adenopathy.     Left cervical: No superficial, deep or posterior cervical adenopathy.      Upper Body:      Right upper body: No supraclavicular, axillary or pectoral adenopathy.      Left upper body: No supraclavicular, axillary or pectoral adenopathy.   Skin:     General: Skin is warm and dry.      Coloration: Skin is not pale.      Findings: No erythema or rash.   Neurological:      Mental Status: She is alert and oriented to person, place, and time.   Psychiatric:         Mood and Affect: Mood normal.         Speech: Speech normal.         Behavior: Behavior normal.  Behavior is cooperative.              ASSESSMENT/PLAN:     Encounter Diagnoses   Name Primary?    Colon cancer screening FU GI colonoscopy.    Yes    Astigmatism with presbyopia, bilateral Stable. FU optho.        Adult general medical examination Check urine.        Floater, vitreous, bilateral Stable.  No new vision changes.  Follow-up with ophthalmology.       High cholesterol Check blood.        Iron deficiency anemia due to chronic blood loss Stable.        Malignant neoplasm of upper-o acute uter quadrant of left breast in female, estrogen receptor positive  (HCC) Check mammogram.  Patient gets through [facility] [place] formally call cancer centers of [place].  Will need to schedule with them after May 2024.  Continue self breast exam every month.         Meibomian gland dysfunction (MGD) of both eyes Stable.        Myopia of both eyes with astigmatism and presbyopia Stable.        Varicose veins of right lower extremity with inflammation Stable.        Crackling sound in both ears Stable.        Pap smear for cervical cancer screening Check pap and HPV.        Weight loss DW pt. of options. Try topamax 25 mg at night. Can  increase in 2 week to 50 mg at night. Discussed with patient of side effects and use of these medications.        Hand pain. Check Xrays and blood.     Vaccine counseling. PCV 20 today.Recommend shingles vaccine.  There is 2 doses of the vaccine  by 2 months 6 months apart.  Check on insurance coverage on shingles vaccine.  May be covered better at the pharmacy.  Separate shingles vaccine from all of the vaccines by at least 1 to 2 weeks.  Discussed about side effects of vaccine.     Orders Placed This Encounter   Procedures    Rheumatoid Arthritis Factor    Sed Rate, Westergren (Automated)    Cyclic Citrullinate Pep. IGG    C-Reactive Protein [E]    URINALYSIS, AUTO, W/O SCOPE    Prevnar 20 (PCV20) [90782]    ThinPrep PAP Smear [E]       Meds This Visit:  Requested Prescriptions      Signed Prescriptions Disp Refills    topiramate 25 MG Oral Tab 60 tablet 3     Sig: Take 2 tablets (50 mg total) by mouth every evening.       Imaging & Referrals:  GASTRO - INTERNAL  PCV20 VACCINE FOR INTRAMUSCULAR USE  XR HAND (2 VIEWS), RIGHT (CPT=73120)  XR HAND (2 VIEWS), LEFT (CPT=73120)      RTC 3 months for FU weight loss.

## 2024-02-17 NOTE — PATIENT INSTRUCTIONS
ASSESSMENT/PLAN:     Encounter Diagnoses   Name Primary?    Colon cancer screening FU GI colonoscopy.    Yes    Astigmatism with presbyopia, bilateral Stable. FU optho.        Adult general medical examination Check urine.        Floater, vitreous, bilateral Stable.        High cholesterol Check blood.        Iron deficiency anemia due to chronic blood loss Stable.        Malignant neoplasm of upper-o acute uter quadrant of left breast in female, estrogen receptor positive  (HCC) Check mammogram. Continue self breast exam every month.         Meibomian gland dysfunction (MGD) of both eyes Stable.        Myopia of both eyes with astigmatism and presbyopia Stable.        Varicose veins of right lower extremity with inflammation Stable.        Crackling sound in both ears Stable.        Pap smear for cervical cancer screening Check pap and HPV.        Weight loss DW pt. of options. Try topamax 25 mg at night. Can  increase in 2 week to 50 mg at night. Discussed with patient of side effects and use of these medications.        Hand pain. Check Xrays and blood.     Vaccine counseling. PCV 20 today. Recommend shingles vaccine.  There is 2 doses of the vaccine  by 2 months 6 months apart.  Check on insurance coverage on shingles vaccine.  May be covered better at the pharmacy.  Separate shingles vaccine from all of the vaccines by at least 1 to 2 weeks.  Discussed about side effects of vaccine.   Shingles (Herpes Zoster)     Talk to your healthcare provider about the shingles vaccine.   Shingles is also called herpes zoster. It's a painful skin rash caused by the herpes zoster virus. This is the same virus that causes chickenpox. After a person has chickenpox, the virus stays inactive in the nerve cells. Years later, the virus can become active again and travel along the nerve to the skin. Most people have shingles only once. But it's possible to have it more than once.  Who is at risk for shingles?  Anyone who  has ever had chickenpox can get shingles. But your risk is greater if you:  Are age 50 or older  Have an illness that weakens your immune system, such as HIV/AIDS  Have cancer, especially Hodgkin disease or lymphoma  Take medicines that weaken your immune system  What are the symptoms of shingles?  The first sign of shingles is often pain, burning, tingling, or itching on one part of your face or body. You may also feel as if you have the flu, with fever and chills.  A red rash with small blisters appears in a few days. The rash may look as follows:   The blisters can occur anywhere, but they’re most common on the back, chest, or belly (abdomen).  They usually appear on only one side of the body, spreading along the nerve pathway where the virus is reactivating.   The rash can also form around an eye, along one side of the face or neck, or in the mouth.  In a few people, often those with a weak immune system, shingles appear on more than one part of the body at once.  After a few days, the blisters become dry and form a crust. The crust falls off in days to weeks. The blisters generally don't leave scars. But they can in severe cases. Or if someone has a weak immune system.    How is shingles treated?  For most people, shingles heals on its own in a few days or weeks. But treatment is advised to help ease pain, speed healing, and reduce the risk of complications. Antiviral medicines are most often only prescribed if you are seen by a healthcare provider within the first 72 hours of having the rash. But antiviral medicines may be prescribed even after 72 hours if someone's immune system is weak. Or if the infection is extensive, severe, or isn't going away. To reduce symptoms:  Apply ice packs or cool compresses, or soak in a cool bath. To make an ice pack, put ice cubes in a plastic bag that seals at the top. Wrap the bag in a clean, thin towel or cloth. Never put ice or an ice pack directly on the skin.  Use  calamine lotion to calm itchy skin.  Ask your provider about over-the-counter pain relievers. If your pain is severe, your provider may prescribe stronger pain medicines.  What are possible complications of shingles?  Shingles often goes away with no lasting effects. But some people have complications during or after the infection comes out:  Postherpetic neuralgia. This is the most common complication. It's more likely as people age, especially after age 60. It' is nerve pain at the place where the rash used to be. It can range from mild to severe. It can last for only a few days, or for months or even years after you have had shingles. Antiviral medicines given during the first 72 hours of the rash can reduce the chance of postherpetic neuralgia. Other medicines can be prescribed to help ease the pain and improve quality of life.  Bacterial infection. Shingles blisters may get infected with bacteria. Depending on the severity of the infection, topical, oral or IV (intravenous) antibiotic medicine is used to treat the infection.  Eye problems. If you have shingles on the face, see your healthcare provider right away. Shingles can cause serious problems with vision, and even blindness.  In very rare cases, shingles can also lead to pneumonia, hearing problems, brain inflammation, or even death.   When to get medical care  Call your healthcare provider if you have any of these:  New symptoms or symptoms that don’t go away with treatment  A rash or blisters near your eye  More drainage, fever, or rash after treatment  Severe pain that doesn’t go away  How can shingles be prevented?  You can only get shingles if you have had chickenpox in the past. Someone who never had chickenpox can get the virus from you. But instead of have shingles, the person may get chickenpox. Until your blisters form scabs, don't have any contact with others, especially the following:  Pregnant women who have never had chickenpox or the  vaccine  Babies who were born early (premature) or who had low weight at birth  People with weak immune systems (such as people getting chemotherapy for cancer, people who have had organ transplants, or people with HIV infections), particularly if they have never had chicken pox.  The shingles vaccine  Two shingles vaccines are available to help prevent shingles or make it less painful:  Zoster vaccine live (ZVL)  Recombinant zoster vaccine (RZV). This is a newer vaccine that has been available since 2017.  You should get the shingles vaccine if you are healthy and age 50 or older, even if you've had shingles in the past. Two shots of the RZV vaccine are recommended. You should get the second RZV shot 2 to 6 months after the first. The vaccine makes it less likely that you will develop shingles. If you do develop shingles, your symptoms will likely be milder than if you hadn’t been vaccinated. RZV is also advised even if you had the older shingles vaccine in the past. That's because the RZV vaccine works better and protects you from shingles longer.  Talk with your healthcare provider about the best time to get vaccinated. Ask which vaccine is best for you based on your age and health conditions.  [employer] last reviewed this educational content on 6/1/2019  © 3138-7996 [employer]. All rights reserved. This information is not intended as a substitute for professional medical care. Always follow your healthcare professional's instructions.        Text SUPPORT1 to 62888 to learn if you may be eligible for financial support with your medication(s).    Msg & Data Rates May Apply. Msg freq varies. Terms apply. Text HELP for help. Text STOP to end.   Zoster Vaccine, Recombinant injection  Brand Name: SHINGRIX  What is this medicine?  ZOSTER VACCINE (ZOS ter vak SEEN) is used to prevent shingles in adults 50 years old and over. This vaccine is not used to treat shingles or nerve pain from shingles.  How  should I use this medicine?  This vaccine is for injection in a muscle. It is given by a health care professional.  Talk to your pediatrician regarding the use of this medicine in children. This medicine is not approved for use in children.  What side effects may I notice from receiving this medicine?  Side effects that you should report to your doctor or health care professional as soon as possible:  allergic reactions like skin rash, itching or hives, swelling of the face, lips, or tongue  breathing problems  Side effects that usually do not require medical attention (report these to your doctor or health care professional if they continue or are bothersome):  chills  headache  fever  nausea, vomiting  redness, warmth, pain, swelling or itching at site where injected  tiredness  What may interact with this medicine?    medicines that suppress your immune system  medicines to treat cancer  steroid medicines like prednisone or cortisone  What if I miss a dose?  Keep appointments for follow-up (booster) doses as directed. It is important not to miss your dose. Call your doctor or health care professional if you are unable to keep an appointment.  Where should I keep my medicine?  This vaccine is only given in a clinic, pharmacy, doctor's office, or other health care setting and will not be stored at home.  What should I tell my health care provider before I take this medicine?  They need to know if you have any of these conditions:  blood disorders or disease  cancer like leukemia or lymphoma  immune system problems or therapy  an unusual or allergic reaction to vaccines, other medications, foods, dyes, or preservatives  pregnant or trying to get pregnant  breast-feeding  What should I watch for while using this medicine?  Visit your doctor for regular check ups.  This vaccine, like all vaccines, may not fully protect everyone.  NOTE:This sheet is a summary. It may not cover all possible information. If you have  questions about this medicine, talk to your doctor, pharmacist, or health care provider. Copyright© 2020 [employer]         Orders Placed This Encounter   Procedures    Rheumatoid Arthritis Factor    Sed Rate, Westergren (Automated)    Cyclic Citrullinate Pep. IGG    C-Reactive Protein [E]    URINALYSIS, AUTO, W/O SCOPE    Prevnar 20 (PCV20) [75754]    ThinPrep PAP Smear [E]       Meds This Visit:  Requested Prescriptions     Signed Prescriptions Disp Refills    topiramate 25 MG Oral Tab 60 tablet 3     Sig: Take 2 tablets (50 mg total) by mouth every evening.       Imaging & Referrals:  GASTRO - INTERNAL  PCV20 VACCINE FOR INTRAMUSCULAR USE  XR HAND (2 VIEWS), RIGHT (CPT=73120)  XR HAND (2 VIEWS), LEFT (CPT=73120)      RTC 3 months for FU weight loss.

## 2024-02-18 NOTE — PROGRESS NOTES
Urine looks okay.  Sed rate which is a nonspecific marker of inflammation is mildly elevated.  Rheumatoid factor is negative.  C-reactive protein which is also a nonspecific marker of inflammation is normal.

## 2024-02-19 LAB — CCP IGG SERPL-ACNC: 1 U/ML (ref 0–6.9)

## 2024-02-29 ENCOUNTER — HOSPITAL ENCOUNTER (OUTPATIENT)
Dept: GENERAL RADIOLOGY | Facility: HOSPITAL | Age: 49
Discharge: HOME | End: 2024-02-29
Attending: INTERNAL MEDICINE
Payer: COMMERCIAL

## 2024-02-29 DIAGNOSIS — M79.642 BILATERAL HAND PAIN: ICD-10-CM

## 2024-02-29 DIAGNOSIS — M79.641 BILATERAL HAND PAIN: ICD-10-CM

## 2024-02-29 PROCEDURE — 73130 X-RAY EXAM OF HAND: CPT | Performed by: INTERNAL MEDICINE

## 2024-03-01 LAB
LAST PAP RESULT: NORMAL
PAP HISTORY (OTHER THAN LAST PAP): NORMAL

## 2024-03-22 ENCOUNTER — TELEPHONE (OUTPATIENT)
Dept: INTERNAL MEDICINE CLINIC | Facility: CLINIC | Age: 49
End: 2024-03-22

## 2024-03-22 DIAGNOSIS — Z78.9 WEIGHT LOSS ADVISED: Primary | ICD-10-CM

## 2024-03-22 NOTE — TELEPHONE ENCOUNTER
Action Requested: Summary for Provider     []  Critical Lab, Recommendations Needed  [x] Need Additional Advice  []   FYI    []   Need Orders  [] Need Medications Sent to Pharmacy  []  Other     SUMMARY: Patient stated Topiramate at 50 mg is not working    Current weight is  245    Asking for further guidance on other medication.    Pharmacy verified    Future Appointments   Date Time Provider Department Center   6/7/2024 12:00 PM [facility]   2/20/2025 10:00 AM Karis Ruff MD [id number] [place] [id number]

## 2024-03-25 NOTE — TELEPHONE ENCOUNTER
Can try Glucophage 250 mg which is a half of the 500 mg twice a day premeals a diabetic diet that helps with weight loss.  Can use in place of the Topamax.  Other option is fluoxetine 20 mg in the morning which is an antidepressant that help with weight loss.  Talked about.  Third option is to see the dietitian after couple sessions looking at going through bariatrics.  But they will require a couple sessions with dietitian and then also attending some classes.

## 2024-04-01 ENCOUNTER — NURSE TRIAGE (OUTPATIENT)
Dept: INTERNAL MEDICINE CLINIC | Facility: CLINIC | Age: 49
End: 2024-04-01

## 2024-04-01 ENCOUNTER — OFFICE VISIT (OUTPATIENT)
Dept: INTERNAL MEDICINE CLINIC | Facility: CLINIC | Age: 49
End: 2024-04-01

## 2024-04-01 VITALS
HEART RATE: 68 BPM | DIASTOLIC BLOOD PRESSURE: 44 MMHG | HEIGHT: 69 IN | RESPIRATION RATE: 17 BRPM | SYSTOLIC BLOOD PRESSURE: 117 MMHG | TEMPERATURE: 98 F | OXYGEN SATURATION: 98 % | WEIGHT: 249 LBS | BODY MASS INDEX: 36.88 KG/M2

## 2024-04-01 DIAGNOSIS — T14.8XXA MUSCULOSKELETAL STRAIN: ICD-10-CM

## 2024-04-01 DIAGNOSIS — M54.50 ACUTE BILATERAL LOW BACK PAIN WITHOUT SCIATICA: Primary | ICD-10-CM

## 2024-04-01 PROCEDURE — 99214 OFFICE O/P EST MOD 30 MIN: CPT | Performed by: INTERNAL MEDICINE

## 2024-04-01 RX ORDER — CYCLOBENZAPRINE HCL 5 MG
5 TABLET ORAL 2 TIMES DAILY PRN
Qty: 30 TABLET | Refills: 0 | Status: SHIPPED | OUTPATIENT
Start: 2024-04-01

## 2024-04-01 RX ORDER — METHYLPREDNISOLONE 4 MG/1
TABLET ORAL
Qty: 1 EACH | Refills: 0 | Status: SHIPPED | OUTPATIENT
Start: 2024-04-01

## 2024-04-01 NOTE — TELEPHONE ENCOUNTER
Action Requested: Summary for Provider     []  Critical Lab, Recommendations Needed  [] Need Additional Advice  []   FYI    []   Need Orders  [] Need Medications Sent to Pharmacy  []  Other     SUMMARY: appt made, back injury on Saturday, ibuprofen for relief, pain continues, heat therapy, advised cool therapy/bio freeze, appt made      Reason for call: Back Pain  Onset: Saturday                    Reason for Disposition   SEVERE back pain (e.g., excruciating, unable to do any normal activities) and not improved after pain medicine and CARE ADVICE    Protocols used: Back Pain-A-[place]

## 2024-04-01 NOTE — PROGRESS NOTES
Subjective:     Patient ID: Karla Du is a 49 year old female.    HPI    Pt comes     History/Other:   Review of Systems  Current Outpatient Medications   Medication Sig Dispense Refill    methylPREDNISolone (MEDROL) 4 MG Oral Tablet Therapy Pack As directed. 1 each 0    cyclobenzaprine 5 MG Oral Tab Take 1 tablet (5 mg total) by mouth 2 (two) times daily as needed for Muscle spasms. 30 tablet 0    metFORMIN 500 MG Oral Tab 1/2 tab pre meals q12hrs. 60 tablet 1    topiramate 25 MG Oral Tab Take 2 tablets (50 mg total) by mouth every evening. 60 tablet 3    folic acid 1 MG Oral Tab       Leuprolide Acetate, 3 Month, (LUPRON DEPOT, 3-MONTH, IM) Inject into the muscle.      anastrozole 1 MG Oral Tab tab TK 1 T PO ONE TIME D      amoxicillin 500 MG Oral Tab Take 1 tablet (500 mg total) by mouth 2 (two) times daily. (Patient not taking: Reported on 4/1/2024)       Allergies:No Known Allergies    Past Medical History:   Diagnosis Date    Cancer (HCC) March 2019    [place]reast Cancer    Viral conjunctivitis of both eyes 11/03/2021      Past Surgical History:   Procedure Laterality Date    BIOPSY OF BREAST, NEEDLE CORE Left 04/19/2019    COLONOSCOPY  2009    IR VARICOSE VEIN ENDOVENOUS LASER ABLATION(CPT=36478) Bilateral 2019    LASIK Bilateral 2012    MIRENA, IUD  02/13/2019    TONSILLECTOMY  1984      Family History   Problem Relation Age of Onset    Glaucoma Maternal Grandmother     Heart Disorder Maternal Grandmother     Colon Cancer Maternal Grandmother 65    Cancer Maternal Grandmother     Other (apnea) Brother     Other (Other) Father         unsure if cancer.    Prostate Cancer Paternal Grandfather     Diabetes Neg     Macular degeneration Neg       Social History:   Social History     Socioeconomic History    Marital status:     Number of children: 1   Occupational History    Occupation:    Tobacco Use    Smoking status: Never    Smokeless tobacco: Never   Substance and Sexual Activity     Alcohol use: Yes     Alcohol/week: 2.0 standard drinks of alcohol     Types: 2 Glasses of wine per week    Drug use: Never    Sexual activity: Yes     Partners: Male        Objective:   Physical Exam    Assessment & Plan:   1. Acute bilateral low back pain without sciatica    2. Musculoskeletal strain        No orders of the defined types were placed in this encounter.      Meds This Visit:  Requested Prescriptions     Signed Prescriptions Disp Refills    methylPREDNISolone (MEDROL) 4 MG Oral Tablet Therapy Pack 1 each 0     Sig: As directed.    cyclobenzaprine 5 MG Oral Tab 30 tablet 0     Sig: Take 1 tablet (5 mg total) by mouth 2 (two) times daily as needed for Muscle spasms.       Imaging & Referrals:  None

## 2024-04-01 NOTE — PROGRESS NOTES
Subjective:   Patient ID: Karla Du is a 49 year old female.    HPI    Pt comes in with complaint of low back pain  Since Saturday night, had bent over too fast and the back pain started afterwards, pain mostly in left side. Does not shoot down the leg   Has used Ibuprofen 800mg, heat therapy.  Started to feel slightly better so she stopped the Ibuprofen, but pain is started to creep up.   Patient also asking if she can get a note so she can work from home since  she is having hard time driving due to the pain          History/Other:   Review of Systems   Constitutional: Negative.    HENT: Negative.     Eyes: Negative.    Respiratory: Negative.     Cardiovascular: Negative.    Gastrointestinal: Negative.    Genitourinary: Negative.    Musculoskeletal:  Positive for back pain.   Skin: Negative.    Neurological: Negative.    Psychiatric/Behavioral: Negative.       Current Outpatient Medications   Medication Sig Dispense Refill    methylPREDNISolone (MEDROL) 4 MG Oral Tablet Therapy Pack As directed. 1 each 0    cyclobenzaprine 5 MG Oral Tab Take 1 tablet (5 mg total) by mouth 2 (two) times daily as needed for Muscle spasms. 30 tablet 0    metFORMIN 500 MG Oral Tab 1/2 tab pre meals q12hrs. 60 tablet 1    topiramate 25 MG Oral Tab Take 2 tablets (50 mg total) by mouth every evening. 60 tablet 3    folic acid 1 MG Oral Tab       Leuprolide Acetate, 3 Month, (LUPRON DEPOT, 3-MONTH, IM) Inject into the muscle.      anastrozole 1 MG Oral Tab tab TK 1 T PO ONE TIME D      amoxicillin 500 MG Oral Tab Take 1 tablet (500 mg total) by mouth 2 (two) times daily. (Patient not taking: Reported on 4/1/2024)       Allergies:No Known Allergies    Objective:   Physical Exam  Vitals and nursing note reviewed.   Constitutional:       Appearance: She is well-developed.   HENT:      Head: Normocephalic and atraumatic.      Right Ear: External ear normal.      Left Ear: External ear normal.      Nose: Nose normal.   Eyes:       Conjunctiva/sclera: Conjunctivae normal.      Pupils: Pupils are equal, round, and reactive to light.   Cardiovascular:      Rate and Rhythm: Normal rate and regular rhythm.      Heart sounds: Normal heart sounds.   Pulmonary:      Effort: Pulmonary effort is normal.      Breath sounds: Normal breath sounds.   Abdominal:      General: Bowel sounds are normal.      Palpations: Abdomen is soft.   Genitourinary:     Vagina: Normal.   Musculoskeletal:         General: Tenderness present. Normal range of motion.      Cervical back: Normal range of motion and neck supple.      Comments: Low back pain, bl sides, Left > rt    Skin:     General: Skin is warm and dry.   Neurological:      Mental Status: She is alert and oriented to person, place, and time.      Deep Tendon Reflexes: Reflexes are normal and symmetric.   Psychiatric:         Behavior: Behavior normal.         Thought Content: Thought content normal.         Judgment: Judgment normal.         Assessment & Plan:   1. Acute bilateral low back pain without sciatica continue with warm compression will treat with steroids and muscle relaxant take as prescribed muscle relaxant can make you drowsy if it makes you drowsy only take it at night not drive if drowsy at all   2. Musculoskeletal strain as above           No orders of the defined types were placed in this encounter.      Meds This Visit:  Requested Prescriptions     Signed Prescriptions Disp Refills    methylPREDNISolone (MEDROL) 4 MG Oral Tablet Therapy Pack 1 each 0     Sig: As directed.    cyclobenzaprine 5 MG Oral Tab 30 tablet 0     Sig: Take 1 tablet (5 mg total) by mouth 2 (two) times daily as needed for Muscle spasms.       Imaging & Referrals:  None

## 2024-07-12 NOTE — TELEPHONE ENCOUNTER
Left detailed voicemail for Ms. Alegre to review the results of her hereditary cancer genetic testing.    Patient previously received genetic counseling. Please see progress note dated 2023.      Result and Interpretation:  Based upon Ms. Alegre's family history, she underwent testing for mutations in the following 70 genes through [facility]'s Multi-Cancer + RNA: AIP*, ALK, APC*, ROOPA*, AXIN2*, BAP1*, BARD1*, BLM*, BMPR1A*, BRCA1*, BRCA2*, BRIP1*, CDC73*, CDH1*, CDK4, CDKN1B*, CDKN2A, CHEK2*, CTNNA1*, DICER1*, EGFR, EPCAM, FH*, FLCN*, GREM1, HOXB13, KIT, LZTR1, MAX*, MBD4, MEN1*, MET, MITF, MLH1*, MSH2*, MSH3*, MSH6*, MUTYH*, NF1*, NF2*, NTHL1*, PALB2*, PDGFRA, PMS2*, POLD1*, POLE*, POT1*, WJWLD2G*, PTCH1*, PTEN*, RAD51C*, RAD51D*, RB1*, RET, SDHA*, SDHAF2*, SDHB*, SDHC*, SDHD*, SMAD4*, SMARCA4*, SMARCB1*, SMARCE1*, STK11*, SUFU*, AUJL207*, TP53*, TSC1*, TSC2*, VHL*  *indicates RNA analysis     The result of this testing is NEGATIVE. The patient was not found to carry a pathogenic or disease-causing mutation in the cancer susceptibility genes analyzed. There are several possible interpretations of this result, includin) It is possible that the patient’s relatives with cancer had a pathogenic mutation in one of these genes which the patient did not happen to inherit.  To address this possibility, we have encouraged the patient’s affected family members to consider genetic testing as well.  If any relatives are found to have a pathogenic mutation in one of these genes, patient’s result could be reclassified as a \"true negative\" result.      2) It is possible that there are other uncommon mutations in these genes and there is a mutation present which cannot be detected.    3) It is possible that the patient's family history of cancer is due to a mutation in a different cancer susceptibility gene that we have not tested for and/or that has yet to be discovered.      4) It is also possible that the cancers in  Please review; protocol failed/ has no protocol    No active /future labs noted   Please see message below for upcoming appointment.    Future Appointments   Date Time Provider Department Center   8/9/2024  2:00 PM Karis Ruff MD [facility]   2/20/2025 10:00 AM Karis Ruff MD [id number] [id number]       Requested Prescriptions   Pending Prescriptions Disp Refills    METFORMIN 500 MG Oral Tab [Pharmacy Med Name: METFORMIN 500MG TABLETS] 60 tablet 1     Sig: TAKE 1/2 TABLET BY MOUTH EVERY 12 HOURS, BEFORE MEALS       Diabetes Medication Protocol Failed - 7/9/2024 12:40 PM        Failed - Last A1C < 7.5 and within past 6 months     No results found for: \"A1C\"          Failed - Microalbumin procedure in past 12 months or taking ACE/ARB        Passed - In person appointment or virtual visit in the past 6 mos or appointment in next 3 mos     Recent Outpatient Visits              3 months ago Acute bilateral low back pain without sciatica    [facility] [place] [facility] [facility]    Office Visit    4 months ago Colon cancer screening    [facility] Karis Ruff MD    Office Visit    2 years ago Viral conjunctivitis of both eyes    [facility] [place] Fatima [facility]    Office Visit    2 years ago [facility]    [facility] Visit    2 years ago Acute bacterial conjunctivitis, unspecified laterality    [facility], Virtual Visit Chaz Roca APRN    E-Visit          Future Appointments         Provider Department Appt Notes    In 4 weeks Karis Ruff MD [facility] [place] per dr moore    In 7 months Karis Ruff MD [facility]                     Passed - EGFRCR or GFRNAA > 50      GFR Evaluation  GFRNAA: 81 , resulted on 11/30/2023          Passed - GFR in the past 12 months           Recent Outpatient Visits              3 months ago Acute bilateral low back pain without sciatica    [facility] [place] Agr[place] B, [place]    Office Visit    4 months ago Colon cancer screening    [facility] Karis Ruff MD    Office Visit    2 years [facility]    [facility]    2 years ago Viral conjunctivitis of both eyes    [facility], [place] Cronin MD    Office Visit    2 years ago Acute bacterial conjunctivitis, unspecified laterality    [facility], Virtual Visit Chaz Roca APRN    E-Visit          Future Appointments         Provider Department Appt Notes    In 4 weeks Karis Ruff MD [facility] [place] per dr moore    In 7 months Karis Ruff MD [facility]              the family are not hereditary, but potentially caused by a combination of inherited and environmental factors.          Please see scanned copy of result for additional details. Any advancement in our medical knowledge may alter our understanding of the results.    Risk Assessment:  Given the patient's reported personal and family histories, as well as the above genetic test results, we reviewed their cancer risks. Given the patient's family history of breast cancer and colon polyps, she may be at slightly increased risk to develop breast cancer and colon polyps.     We reviewed the following cancer screening options based on her above risks. Screening and risk-reduction options should be discussed with the patient's care team, who may further modify these options on a case-by-case basis.     Breast (RON breast cancer lifetime was risk: 11.7% compared to general population risk of 10.5%)   Monthly self-breast exam  Annual clinical breast exams   Annual screening mammogram  Uterine/Cervical/Ovarian:  Age-appropriate gynecologic care  Colon:  Colonoscopy every 5-10 years beginning at age 40  Skin:   Full body skin exams every 1-3 years   Other:  Age-appropriate cancer screening and risk reduction for other general population cancer risks    Implications for Family Members:   Based on María's negative test results it is assumed that her children are negative for maternally inherited variants in the genes for which María had negative testing. Note that her genetic test results do not take into account her children's paternal family history of cancer.     At this time, hereditary cancer genetic testing is still indicated for the patient's at-risk relatives. We encouraged her to share this information with her family members.     Plan:  Ms. Alegre's hereditary cancer genetic test results are NEGATIVE. They are encouraged to discuss this information with their healthcare team in more detail, including her referring  provider, Galileo Bell MD.  If anyone in the family is diagnosed with cancer or has additional hereditary cancer genetic testing, it may change our risk assessment. Therefore, we encouraged patient to contact our office periodically to update family history.   Recommendations for screening are subject to change over time.  We encouraged patient to maintain current contact information with our office and to check regularly with their care providers to ensure that the most current recommendations are being followed.      Jade Saavedra MS, [facility]   [phone]

## 2024-09-16 ENCOUNTER — TELEPHONE (OUTPATIENT)
Dept: INTERNAL MEDICINE CLINIC | Facility: CLINIC | Age: 49
End: 2024-09-16

## 2024-09-16 DIAGNOSIS — E78.2 MIXED HYPERLIPIDEMIA: ICD-10-CM

## 2024-09-16 DIAGNOSIS — R74.8 ELEVATED ALKALINE PHOSPHATASE LEVEL: ICD-10-CM

## 2024-09-16 DIAGNOSIS — R74.8 ELEVATED LIVER ENZYMES: Primary | ICD-10-CM

## 2024-09-16 NOTE — TELEPHONE ENCOUNTER
Electrolytes sugar kidney and liver functions are normal.  However liver enzymes are slightly elevated alkaline phosphatase and LDH.  Cholesterol is also elevated.  Total cholesterol 241 normal should less than 200.  Triglycerides are 146.  Normal should less than 150.  HDL should be above 50.  It is currently at 44.  HDL pose a cardiovascular sustained aerobic exercise at least 30 minutes 3-4 times a week.  However LDL is very high at 170.  Goal is less than 100.Careful with diet.  Avoid red meat, shellfish, pork.  Try not to alejandra foods.  Lower carb diet.  Exercise is most part at least 30 minutes 3-4 times a week sustained cardiovascular aerobic exercise getting that heart rate going and keeping it going for 30 minutes at a time.  If cannot do 30 will start with 10 minutes of brisk walking is good at each week build up 5 minutes more.  Recheck lipid in 3 months.  White cell count cell counts are normal.  Thyroid is normal.  Possibility of trying cholesterol-lowering medicine i.e. dose like atorvastatin 40 mg?  If okay can send to the pharmacy.  Repeat blood in 3 months.  Will need some other blood work nonfasting.  Orders placed.  Will also need blood work in 3 months to recheck lipid.  Orders placed for fasting.  Will need liver ultrasound.  Orders written.

## 2024-09-17 ENCOUNTER — MED REC SCAN ONLY (OUTPATIENT)
Dept: INTERNAL MEDICINE CLINIC | Facility: CLINIC | Age: 49
End: 2024-09-17

## 2024-12-02 NOTE — H&P
[facility] - Gastroenterology                                                                                                               Reason for consult: eval    Requesting physician or provider: Karis Ruff MD    Chief Complaint   Patient presents with    Colonoscopy Screening       HPI:   Karla Du is a 49 year old year-old female with history of breast ca:    she is here today for evaluation prior to cln    she moves her bowels daily and without recent change. she denies straining and/or incomplete evacuation. H/o brbpr related to known hemorrhoids. No melena.     she denies acid reflux and/or heartburn. she denies dysphagia, odynophagia and/or globus. she denies abdominal pain. she denies nausea and/or vomiting.  she denies recent change in appetite and/or unintentional weight loss.    Liver enzymes elevated 9/2024  Having ultrasound sat and labs with pcp    NSAIDS: no  Tobacco: no  Alcohol: seldom  Marijuana: no  Illicit drugs: no    No FH GI malignancy  Maternal gm had colon ca in her 60's or 70's    No history of adverse reaction to sedation  No SHEMAR  No anticoagulants  No pacemaker/defibrillator  No pain medications and/or sleep aides      [facility] and recalls having polyps  C-scope mid 2002's in [place] and she denies having polyps    She says procedures done because of fhx colon ca    Last EGD: no    Wt Readings from Last 6 Encounters:   12/04/24 245 lb (111.1 kg)   04/01/24 249 lb (112.9 kg)   02/17/24 247 lb 9.6 oz (112.3 kg)   08/04/20 229 lb 6.4 oz (104.1 kg)   06/26/20 230 lb 6.4 oz (104.5 kg)   03/12/20 230 lb (104.3 kg)        History, Medications, Allergies, ROS:      Past Medical History:    Cancer (HCC)    Brreast Cancer    Viral conjunctivitis of both eyes      Past Surgical History:   Procedure Laterality Date    Biopsy of breast, needle core Left 04/19/2019     Colonoscopy  2009    Ir varicose vein endovenous laser ablation(cpt=36478) Bilateral 2019    Lasik Bilateral 2012    Mirena, iud  02/13/2019    Tonsillectomy  1984      Family Hx:   Family History   Problem Relation Age of Onset    Other (Other) Father         unsure if cancer.    Glaucoma Maternal Grandmother     Heart Disorder Maternal Grandmother     Colon Cancer Maternal Grandmother 65    Cancer Maternal Grandmother         colon cancer    Prostate Cancer Paternal Grandfather     Other (apnea) Brother     Diabetes Neg     Macular degeneration Neg       Social History:   Social History     Socioeconomic History    Marital status:     Number of children: 1   Occupational History    Occupation:    Tobacco Use    Smoking status: Never    Smokeless tobacco: Never   Substance and Sexual Activity    Alcohol use: Yes     Alcohol/week: 2.0 standard drinks of alcohol     Types: 2 Glasses of wine per week    Drug use: Never    Sexual activity: Yes     Partners: Male        Medications (Active prior to today's visit):  Current Outpatient Medications   Medication Sig Dispense Refill    Biotin 1 MG Oral Cap Take by mouth.      Cholecalciferol (VITAMIN D3) 10 MCG (400 UNIT) Oral Cap Take by mouth.      Lactobacillus (PROBIOTIC ACIDOPHILUS) Oral Cap Take by mouth.      PEG 3350-KCl-Na Bicarb-NaCl (TRILYTE) 420 g Oral Recon Soln Take prep as directed by gastro office. May substitute with Trilyte/generic equivalent if needed. 4000 mL 0       Allergies:  Allergies[1]    ROS:   CONSTITUTIONAL: negative for fevers, chills, sweats and weight loss  EYES Negative for red eyes, yellow eyes, changes in vision  HEENT: Negative for dysphagia and hoarseness  RESPIRATORY: Negative for cough and shortness of breath  CARDIOVASCULAR: Negative for chest pain, palpitations  GASTROINTESTINAL: See HPI  GENITOURINARY: Negative for dysuria and frequency  MUSCULOSKELETAL: Negative for arthralgias and myalgias  NEUROLOGICAL: Negative  for dizziness and headaches  BEHAVIOR/PSYCH: Negative for anxiety and poor appetite    PHYSICAL EXAM:   Blood pressure 107/66, pulse 74, height 5' 9\" (1.753 m), weight 245 lb (111.1 kg), not currently breastfeeding.    GEN: WD/WN, NAD  HEENT: Supple symmetrical, trachea midline  CV: RRR, the extremities are warm and well perfused   LUNGS: No increased work of breathing  ABDOMEN: No scars, normal bowel sounds, soft, non-tender, non-distended no rebound or guarding, no masses, no hepatomegaly  MSK: No redness, no warmth, no swelling of joints  SKIN: No jaundice, no erythema, no rashes  HEMATOLOGIC: No bleeding, no bruising  NEURO: Alert and interactive, normal gait    Labs/Imaging/Procedures:     Patient's pertinent labs and imaging were reviewed and discussed with patient today.        .  ASSESSMENT/PLAN:   Karla Du is a 49 year old year-old female with history of breast ca:    #elevated liver function testing  No significant etoh, labs and ultrasound ordered by pcp and pt will be completing work-up.  Additional recommendations below    #crc screening  #brbpr  She reports maternal gm had colon ca and pt has had [facility] x [facility] for [facility] screening.  She recalls last cln 2009 and denies polyps.  She reports occasional brbpr attributed to known hemorrhoids. Not anemic 9/2024. Due for screening now.     -low fat diet  -healthy bmi  -monitor cholesterol, blood sugar with pcpo  -complete labs and ultrasound with pcp  -fibercon or citrucel    1. Schedule colonoscopy with MAC w/ general pool MD [Diagnosis: crc screening]    2.  bowel prep from pharmacy (split trilyte)    3.   For cardiology patients and patients on blood thinners:  Please contact your cardiology clinic for clearance to proceed with the endoscopic procedure. If you are on blood thinners, please also confirm with your cardiologic clinic that you are able to hold the blood thinner per our recommendations.\"    BLOOD THINNER ORDERS:  -Hold for  48 hours (Xarelto, Eliquis, Pradaxa, Savaysa)  -Hold for 3 days (Pletal)  -Hold for 5 days (Coumadin, Plavix, Brilinta, Aggrenox)  -Hold for 7 days (Effient)     For endocrinology insulin patients:    Please contact your endocrinology clinic for insulin adjustment orders prior to your endoscopic procedure.    4. Read all bowel prep instructions carefully. Bowel prep instructions can also be found online at:  [email or web address]     5. AVOID seeds, nuts, popcorn, raw fruits and vegetables for 3 days before procedure    6.  If you start any NEW medication after your visit today, please notify us. Certain medications (like iron or weight loss medications) will need to be held before the procedure, or the procedure cannot be performed safely.      Orders This Visit:  No orders of the defined types were placed in this encounter.      Meds This Visit:  Requested Prescriptions     Signed Prescriptions Disp Refills    PEG 3350-KCl-Na Bicarb-NaCl (TRILYTE) 420 g Oral Recon Soln 4000 mL 0     Sig: Take prep as directed by gastro office. May substitute with Trilyte/generic equivalent if needed.       Imaging & Referrals:  None    ENDOSCOPIC RISK BENEFIT DISCUSSION: I described the procedure in great detail with the patient. I discussed the risks and benefits, including but not limited to: bleeding, perforation, infection, anesthesia complications, and even death. Patient will be NPO after midnight and will have a person physically present at time of pick-up to drive patient home. Patient verbalized understanding and agrees to proceed with procedure as planned.      Stacie Luo, APRN   12/2/2024        This note was partially prepared using Dragon Medical voice recognition dictation software. As a result, errors may occur. When identified, these errors have been corrected. While every attempt is made to correct errors during dictation, discrepancies may still exist.          [1] No Known Allergies

## 2024-12-03 NOTE — TELEPHONE ENCOUNTER
CC:   Chief Complaint   Patient presents with    vaginal odor        History:  Miley Villalba is a 35 y.o. female who presents today for evaluation of the above problems.      HPI    Patient presents with complaint of abnormal vaginal odor.  Ongoing about 1 week.  She is sexually active-in a homosexual relationship.  Denies any change in soap or lotions.  Denies any urinary symptoms or vaginal discharge.  Denies any rash or redness to genitalia.  Patient concerned for BV.      No Known Allergies  Past Medical History:   Diagnosis Date    Other sleep apnea      Past Surgical History:   Procedure Laterality Date    KNEE CARTILAGE SURGERY Right      Family History   Problem Relation Age of Onset    Diabetes Father     Alzheimer's disease Mother     Melanoma Maternal Grandfather     Diabetes Maternal Aunt     Diabetes Maternal Uncle     Diabetes Paternal Aunt     Diabetes Paternal Uncle       reports that she quit smoking about 10 years ago. Her smoking use included cigarettes. She started smoking about 23 years ago. She has a 13 pack-year smoking history. She has been exposed to tobacco smoke. She has never used smokeless tobacco. She reports current alcohol use. She reports that she does not use drugs.      Current Outpatient Medications:     busPIRone (BUSPAR) 7.5 MG tablet, TAKE 1 TABLET BY MOUTH THREE TIMES DAILY, Disp: 270 tablet, Rfl: 1    citalopram (CeleXA) 40 MG tablet, Take 1 tablet by mouth once daily, Disp: 90 tablet, Rfl: 2    lamoTRIgine (LaMICtal) 25 MG tablet, TAKE 1 TABLET BY MOUTH AT BEDTIME FOR  MOOD  STABILIZER, Disp: 90 tablet, Rfl: 1    MELATONIN PO, Take  by mouth., Disp: , Rfl:     omeprazole (priLOSEC) 40 MG capsule, Take 1 capsule by mouth once daily, Disp: 90 capsule, Rfl: 3    OXcarbazepine (Trileptal) 150 MG tablet, Take 1 tablet by mouth 2 (Two) Times a Day., Disp: 60 tablet, Rfl: 0    prazosin (MINIPRESS) 1 MG capsule, TAKE 1 CAPSULE BY MOUTH ONCE DAILY AT NIGHT, Disp: 90 capsule, Rfl:  Lmtcb "2    OBJECTIVE:  /86 (BP Location: Left arm, Patient Position: Sitting, Cuff Size: Large Adult)   Pulse 74   Temp 97.8 °F (36.6 °C) (Temporal)   Ht 167.6 cm (66\")   Wt (!) 141 kg (311 lb)   SpO2 99%   BMI 50.20 kg/m²    Estimated body mass index is 50.2 kg/m² as calculated from the following:    Height as of this encounter: 167.6 cm (66\").    Weight as of this encounter: 141 kg (311 lb).                  Physical Exam  Vitals reviewed.   Constitutional:       General: She is not in acute distress.     Appearance: Normal appearance.   HENT:      Head: Normocephalic and atraumatic.   Cardiovascular:      Rate and Rhythm: Normal rate and regular rhythm.      Heart sounds: Normal heart sounds.   Pulmonary:      Effort: No respiratory distress.      Breath sounds: Normal breath sounds. No wheezing.   Abdominal:      Tenderness: There is no right CVA tenderness or left CVA tenderness.   Neurological:      Mental Status: She is alert and oriented to person, place, and time.   Psychiatric:         Mood and Affect: Mood normal.         Behavior: Behavior normal.              Assessment/Plan    Diagnoses and all orders for this visit:    1. Vaginal odor (Primary)  -     [employer] Miscellaneous - , Vagina    Will evaluate further with vaginal swab- will call patient once results and base treatment plan on that. Patient agrees with plan.             An After Visit Summary was printed and given to the patient at discharge.  Return if symptoms worsen or fail to improve.           "

## 2024-12-04 ENCOUNTER — TELEPHONE (OUTPATIENT)
Dept: GASTROENTEROLOGY | Facility: CLINIC | Age: 49
End: 2024-12-04

## 2024-12-04 ENCOUNTER — OFFICE VISIT (OUTPATIENT)
Dept: GASTROENTEROLOGY | Facility: CLINIC | Age: 49
End: 2024-12-04
Payer: COMMERCIAL

## 2024-12-04 VITALS
DIASTOLIC BLOOD PRESSURE: 66 MMHG | HEART RATE: 74 BPM | BODY MASS INDEX: 36.29 KG/M2 | WEIGHT: 245 LBS | HEIGHT: 69 IN | SYSTOLIC BLOOD PRESSURE: 107 MMHG

## 2024-12-04 DIAGNOSIS — R74.8 ELEVATED LIVER ENZYMES: ICD-10-CM

## 2024-12-04 DIAGNOSIS — Z12.11 COLON CANCER SCREENING: Primary | ICD-10-CM

## 2024-12-04 DIAGNOSIS — K62.5 BRBPR (BRIGHT RED BLOOD PER RECTUM): ICD-10-CM

## 2024-12-04 PROCEDURE — S0285 CNSLT BEFORE SCREEN COLONOSC: HCPCS | Performed by: NURSE PRACTITIONER

## 2024-12-04 RX ORDER — IBUPROFEN 800 MG
TABLET ORAL
COMMUNITY

## 2024-12-04 RX ORDER — CHOLECALCIFEROL (VITAMIN D3) 125 MCG
CAPSULE ORAL
COMMUNITY

## 2024-12-04 RX ORDER — POLYETHYLENE GLYCOL 3350, SODIUM CHLORIDE, SODIUM BICARBONATE, POTASSIUM CHLORIDE 420; 11.2; 5.72; 1.48 G/4L; G/4L; G/4L; G/4L
POWDER, FOR SOLUTION ORAL
Qty: 4000 ML | Refills: 0 | Status: SHIPPED | OUTPATIENT
Start: 2024-12-04

## 2024-12-04 RX ORDER — NICOTINE POLACRILEX 2 MG
GUM BUCCAL
COMMUNITY

## 2024-12-04 NOTE — TELEPHONE ENCOUNTER
Scheduled for:  Colonoscopy [address]    Provider Name:  Dr Sanchez    Date:  4/21/2025    Location:    [facility]    Sedation:  MAC    Time:  2:15 pm (Patient made aware [facility] will call the day before with procedure/arrival time)    Prep:  MAC    Meds/Allergies Reconciled?:  Physician reviewed     Diagnosis with codes:    Colon cancer screening [Z12.11]     Was patient informed to call insurance with codes (Y/N):  Yes, I confirmed [employer] insurance with the patient.     Referral sent?:  N/A    EM or [facility] notified?:  I sent an electronic request to Endo Scheduling and received a confirmation today.      Medication Orders:  This patient verbally confirmed that she is not taking:    Iron, blood thinners, BP meds, and is not diabetic    No latex allergy, No PCN allergy and does not have a pacemaker     Misc Orders:    Hold Semiglutide 7 days prior to procedure     Further instructions given by staff:   I discussed the prep instructions with the patient which she verbally understood and is aware that I will send the instructions today via [employer].    Advised patient:    You will not be able to drive, operate machinery or make critical decisions the day of your procedure. Please make arrangements for transportation. You must have a  (age 18 or older) to accompany you, stay in the facility for the duration of your procedure and drive you home after the procedure.  You cannot use public transportation (Uber, Lyft, Taxi). The procedure involves sedation, and you will not be allowed to leave unaccompanied. Your procedure will not proceed forward if you're unable to confirm your  planned to escort you home.    Advised Patient:    [facility] requires payment of copay and any patient responsibility at the time of registration.   The [facility] requires copay and 50% of the patient responsibility at the time of service for all Esophagogastroduodenoscopy and diagnostic Colonoscopies.     They do offer payment plans and Care Credit options if  unable to pay the full amount at the time of registration.     If you have any questions regarding your potential responsibility, please contact [facility] Insurance Department at [phone] option 1.    You may receive 4 bills related to your medical procedure:   [facility] (the facility)  The procedural physician  The anesthesiologist  The pathology lab (if applicable)

## 2024-12-04 NOTE — TELEPHONE ENCOUNTER
Schedulers, see providers orders below:     -Patient was seen in office today and was provided with written and verbal procedure prep instructions, including any medication adjustments.   -Patient was advised to call medical insurance for any questions on benefits and/or any out of pocket costs.   -Patient is aware that the GI schedulers will be calling to schedule procedure(s) and that providers may not have any availability until possibly end of the year.         1. Schedule colonoscopy with MAC w/ general pool MD [Diagnosis: crc screening]     2.  bowel prep from pharmacy ([facility])     3.   For cardiology patients and patients on blood thinners:  Please contact your cardiology clinic for clearance to proceed with the endoscopic procedure. If you are on blood thinners, please also confirm with your cardiologic clinic that you are able to hold the blood thinner per our recommendations.\"     BLOOD THINNER ORDERS:  -Hold for 48 hours (Xarelto, Eliquis, Pradaxa, Savaysa)  -Hold for 3 days (Pletal)  -Hold for 5 days (Coumadin, Plavix, Brilinta, Aggrenox)  -Hold for 7 days (Effient)      For endocrinology insulin patients:     Please contact your endocrinology clinic for insulin adjustment orders prior to your endoscopic procedure.     4. Read all bowel prep instructions carefully. Bowel prep instructions can also be found online at:  [email or web address]      5. AVOID seeds, nuts, popcorn, raw fruits and vegetables for 3 days before procedure     6.  If you start any NEW medication after your visit today, please notify us. Certain medications (like iron or weight loss medications) will need to be held before the procedure, or the procedure cannot be performed safely.

## 2024-12-04 NOTE — PATIENT INSTRUCTIONS
-low fat diet  -healthy bmi  -monitor cholesterol, blood sugar with pcpo  -complete labs and ultrasound with pcp  -fibercon or citrucel    1. Schedule colonoscopy with [facility] w/ general pool MD [Diagnosis: crc screening]    2.  bowel prep from pharmacy (split trilyte)    3.   For cardiology patients and patients on blood thinners:  Please contact your cardiology clinic for clearance to proceed with the endoscopic procedure. If you are on blood thinners, please also confirm with your cardiologic clinic that you are able to hold the blood thinner per our recommendations.\"    BLOOD THINNER ORDERS:  -Hold for 48 hours (Xarelto, Eliquis, Pradaxa, Savaysa)  -Hold for 3 days (Pletal)  -Hold for 5 days (Coumadin, Plavix, Brilinta, Aggrenox)  -Hold for 7 days (Effient)     For endocrinology insulin patients:    Please contact your endocrinology clinic for insulin adjustment orders prior to your endoscopic procedure.    4. Read all bowel prep instructions carefully. Bowel prep instructions can also be found online at:  [email or web address]     5. AVOID seeds, nuts, popcorn, raw fruits and vegetables for 3 days before procedure    6.  If you start any NEW medication after your visit today, please notify us. Certain medications (like iron or weight loss medications) will need to be held before the procedure, or the procedure cannot be performed safely.

## 2024-12-07 ENCOUNTER — LAB ENCOUNTER (OUTPATIENT)
Dept: LAB | Facility: HOSPITAL | Age: 49
End: 2024-12-07
Attending: INTERNAL MEDICINE
Payer: COMMERCIAL

## 2024-12-07 ENCOUNTER — HOSPITAL ENCOUNTER (OUTPATIENT)
Dept: ULTRASOUND IMAGING | Facility: HOSPITAL | Age: 49
Discharge: HOME | End: 2024-12-07
Attending: INTERNAL MEDICINE
Payer: COMMERCIAL

## 2024-12-07 DIAGNOSIS — R74.8 ELEVATED LIVER ENZYMES: ICD-10-CM

## 2024-12-07 DIAGNOSIS — R74.8 ELEVATED ALKALINE PHOSPHATASE LEVEL: ICD-10-CM

## 2024-12-07 DIAGNOSIS — E78.2 MIXED HYPERLIPIDEMIA: ICD-10-CM

## 2024-12-07 LAB
ALBUMIN SERPL-MCNC: 4.4 G/DL (ref 3.2–4.8)
ALBUMIN/GLOB SERPL: 1.6 {RATIO} (ref 1–2)
ALP LIVER SERPL-CCNC: 111 U/L
ALT SERPL-CCNC: 63 U/L
ANION GAP SERPL CALC-SCNC: 7 MMOL/L (ref 0–18)
AST SERPL-CCNC: 36 U/L (ref ?–34)
BILIRUB SERPL-MCNC: 0.4 MG/DL (ref 0.3–1.2)
BUN BLD-MCNC: 10 MG/DL (ref 9–23)
BUN/CREAT SERPL: 12.3 (ref 10–20)
CALCIUM BLD-MCNC: 9.7 MG/DL (ref 8.7–10.4)
CHLORIDE SERPL-SCNC: 111 MMOL/L (ref 98–112)
CHOLEST SERPL-MCNC: 180 MG/DL (ref ?–200)
CO2 SERPL-SCNC: 25 MMOL/L (ref 21–32)
CREAT BLD-MCNC: 0.81 MG/DL
DEPRECATED HBV CORE AB SER IA-ACNC: 105 NG/ML
EGFRCR SERPLBLD CKD-EPI 2021: 88 ML/MIN/1.73M2 (ref 60–?)
FASTING PATIENT LIPID ANSWER: YES
FASTING STATUS PATIENT QL REPORTED: YES
GLOBULIN PLAS-MCNC: 2.7 G/DL (ref 2–3.5)
GLUCOSE BLD-MCNC: 92 MG/DL (ref 70–99)
HAV AB SER QL IA: REACTIVE
HAV IGM SER QL: NONREACTIVE
HBV CORE AB SERPL QL IA: NONREACTIVE
HBV SURFACE AB SER QL: NONREACTIVE
HBV SURFACE AB SERPL IA-ACNC: <3.1 MIU/ML
HBV SURFACE AG SERPL QL IA: NONREACTIVE
HCV AB SERPL QL IA: NONREACTIVE
HDLC SERPL-MCNC: 40 MG/DL (ref 40–59)
LDLC SERPL CALC-MCNC: 121 MG/DL (ref ?–100)
NONHDLC SERPL-MCNC: 140 MG/DL (ref ?–130)
OSMOLALITY SERPL CALC.SUM OF ELEC: 295 MOSM/KG (ref 275–295)
POTASSIUM SERPL-SCNC: 4.1 MMOL/L (ref 3.5–5.1)
PROT SERPL-MCNC: 7.1 G/DL (ref 5.7–8.2)
PTH-INTACT SERPL-MCNC: 118.6 PG/ML (ref 18.5–88)
SODIUM SERPL-SCNC: 143 MMOL/L (ref 136–145)
TRIGL SERPL-MCNC: 101 MG/DL (ref 30–149)
TSI SER-ACNC: 1.24 UIU/ML (ref 0.55–4.78)
VIT D+METAB SERPL-MCNC: 37.5 NG/ML (ref 30–100)
VLDLC SERPL CALC-MCNC: 18 MG/DL (ref 0–30)

## 2024-12-07 PROCEDURE — 82390 ASSAY OF CERULOPLASMIN: CPT | Performed by: INTERNAL MEDICINE

## 2024-12-07 PROCEDURE — 82728 ASSAY OF FERRITIN: CPT

## 2024-12-07 PROCEDURE — 87340 HEPATITIS B SURFACE AG IA: CPT

## 2024-12-07 PROCEDURE — 80061 LIPID PANEL: CPT

## 2024-12-07 PROCEDURE — 86708 HEPATITIS A ANTIBODY: CPT

## 2024-12-07 PROCEDURE — 84075 ASSAY ALKALINE PHOSPHATASE: CPT | Performed by: INTERNAL MEDICINE

## 2024-12-07 PROCEDURE — 80053 COMPREHEN METABOLIC PANEL: CPT

## 2024-12-07 PROCEDURE — 86706 HEP B SURFACE ANTIBODY: CPT

## 2024-12-07 PROCEDURE — 36415 COLL VENOUS BLD VENIPUNCTURE: CPT

## 2024-12-07 PROCEDURE — 83970 ASSAY OF PARATHORMONE: CPT

## 2024-12-07 PROCEDURE — 84443 ASSAY THYROID STIM HORMONE: CPT

## 2024-12-07 PROCEDURE — 83516 IMMUNOASSAY NONANTIBODY: CPT | Performed by: INTERNAL MEDICINE

## 2024-12-07 PROCEDURE — 82784 ASSAY IGA/IGD/IGG/IGM EACH: CPT | Performed by: INTERNAL MEDICINE

## 2024-12-07 PROCEDURE — 84080 ASSAY ALKALINE PHOSPHATASES: CPT | Performed by: INTERNAL MEDICINE

## 2024-12-07 PROCEDURE — 86140 C-REACTIVE PROTEIN: CPT | Performed by: INTERNAL MEDICINE

## 2024-12-07 PROCEDURE — 76705 ECHO EXAM OF ABDOMEN: CPT | Performed by: INTERNAL MEDICINE

## 2024-12-07 PROCEDURE — 86709 HEPATITIS A IGM ANTIBODY: CPT

## 2024-12-07 PROCEDURE — 86704 HEP B CORE ANTIBODY TOTAL: CPT

## 2024-12-07 PROCEDURE — 86803 HEPATITIS C AB TEST: CPT

## 2024-12-07 PROCEDURE — 86364 TISS TRNSGLTMNASE EA IG CLAS: CPT | Performed by: INTERNAL MEDICINE

## 2024-12-07 PROCEDURE — 82306 VITAMIN D 25 HYDROXY: CPT

## 2024-12-07 PROCEDURE — 84466 ASSAY OF TRANSFERRIN: CPT | Performed by: INTERNAL MEDICINE

## 2024-12-07 PROCEDURE — 83540 ASSAY OF IRON: CPT | Performed by: INTERNAL MEDICINE

## 2024-12-07 PROCEDURE — 86235 NUCLEAR ANTIGEN ANTIBODY: CPT | Performed by: INTERNAL MEDICINE

## 2024-12-07 PROCEDURE — 80503 PATH CLIN CONSLTJ SF 5-20: CPT

## 2024-12-08 PROBLEM — E21.3 HYPERPARATHYROIDISM (HCC): Status: ACTIVE | Noted: 2024-12-08

## 2024-12-09 NOTE — PROGRESS NOTES
Iron storage looks okay.  Vitamin D level looks good.  Continue the same.  CMP Normal (electrolytes, sugar, kidney and liver functions), liver enzymes are elevated.  Awaiting liver ultrasound results.  Awaiting some more blood.  Lipid (choilesterol) is better but still elevated.  Goal LDL should be less than 100.  Goal HDL should be above 50.  HDL pose a cardiovascular sustained aerobic exercise at least 30 minutes 3-4 times a week. Careful with diet.  Avoid red meat, shellfish, pork.  Try not to alejandra foods.  Lower carb diet.  Exercise is most part at least 30 minutes 3-4 times a week sustained cardiovascular aerobic exercise getting that heart rate going and keeping it going for 30 minutes at a time.  If cannot do 30 will start with 10 minutes of brisk walking is good at each week build up 5 minutes more.  Recheck lipid in 3 months.  Orders written.  Thyroid is good.   Hepatitis panel shows hepatitis A is positive.  Old infection that has healed up and no longer contagious.  Not explain the elevated liver enzymes.  Hepatitis B and C are negative.    Parathyroid hormone is elevated.  Which may explain the elevated alkaline phosphatase.  Will check parathyroid scan.  Orders written.

## 2024-12-11 LAB
ALK PHOSPHATASE: 116 IU/L
BONE FRAC: 41 %
INTESTINAL FRAC: 0 %
LIVER FRAC: 59 %

## 2024-12-16 ENCOUNTER — OFFICE VISIT (OUTPATIENT)
Dept: INTERNAL MEDICINE CLINIC | Facility: CLINIC | Age: 49
End: 2024-12-16
Payer: COMMERCIAL

## 2024-12-16 VITALS
HEART RATE: 72 BPM | OXYGEN SATURATION: 96 % | WEIGHT: 239 LBS | HEIGHT: 69 IN | TEMPERATURE: 98 F | SYSTOLIC BLOOD PRESSURE: 110 MMHG | BODY MASS INDEX: 35.4 KG/M2 | DIASTOLIC BLOOD PRESSURE: 70 MMHG

## 2024-12-16 DIAGNOSIS — M25.561 CHRONIC PAIN OF RIGHT KNEE: ICD-10-CM

## 2024-12-16 DIAGNOSIS — Z71.85 IMMUNIZATION COUNSELING: ICD-10-CM

## 2024-12-16 DIAGNOSIS — R74.8 ELEVATED ALKALINE PHOSPHATASE LEVEL: ICD-10-CM

## 2024-12-16 DIAGNOSIS — E78.00 HIGH CHOLESTEROL: Primary | ICD-10-CM

## 2024-12-16 DIAGNOSIS — D50.0 IRON DEFICIENCY ANEMIA DUE TO CHRONIC BLOOD LOSS: ICD-10-CM

## 2024-12-16 DIAGNOSIS — Z12.11 COLON CANCER SCREENING: ICD-10-CM

## 2024-12-16 DIAGNOSIS — E21.3 HYPERPARATHYROIDISM (HCC): ICD-10-CM

## 2024-12-16 DIAGNOSIS — R74.8 ELEVATED LIVER ENZYMES: ICD-10-CM

## 2024-12-16 DIAGNOSIS — G89.29 CHRONIC PAIN OF RIGHT KNEE: ICD-10-CM

## 2024-12-16 PROCEDURE — 99215 OFFICE O/P EST HI 40 MIN: CPT | Performed by: INTERNAL MEDICINE

## 2024-12-16 NOTE — PATIENT INSTRUCTIONS
ASSESSMENT/PLAN:     Encounter Diagnoses   Name Primary?    High cholesterol Check blood.    Yes    Hyperparathyroidism (HCC) Check scan.   Right    Iron deficiency anemia due to chronic blood loss Check blood in 3 months. Fu GI for EGD.        Immunization counseling Recommend shingles vaccine.  There is 2 doses of the vaccine  by 2 months 6 months apart.  Check on insurance coverage on shingles vaccine.  May be covered better at the pharmacy.  Separate shingles vaccine from all of the vaccines by at least 1 to 2 weeks.  Discussed about side effects of vaccine. Holding covid booster and flu vaccines.        Colon cancer screening Scheduled.        Elevated liver enzymes Check blood in 3 months and US.        Elevated alkaline phosphatase level Awaiting parathyroid scan.       Weight loss. Losing weight. Sees weight loss clinic.     Tremors. Hold treament. Check on family history. Monitor.     R knee pain. Check Xrays. Try capasacium with ice with gloves up to 2 times a day. Limited excercises.        Orders Placed This Encounter   Procedures    CBC With Differential With Platelet    Iron And Tibc    Ferritin       Meds This Visit:  Requested Prescriptions      No prescriptions requested or ordered in this encounter       Imaging & Referrals:  [facility]STR[facility] - INTERNAL  XR KNEE (1 OR 2 VIEWS), RIGHT (CPT=73560)      Has set kain't.

## 2024-12-16 NOTE — PROGRESS NOTES
HPI:    Patient ID: Karla Du is a 49 year old female.    Exercise level: somewhat active (3 times a week, lifting weightsand goes to  and needs more cardio.) and has been following low salt diet.  Weight has been down. Eatting better since 10-24. Cooks more. Eatting out less. Being smarter about choices. On semaglutide. Controls hunger. Get constipation but increased fiber.   Wt Readings from Last 3 Encounters:   12/16/24 239 lb (108.4 kg)   12/04/24 245 lb (111.1 kg)   04/01/24 249 lb (112.9 kg)     BP Readings from Last 3 Encounters:   12/16/24 110/70   12/04/24 107/66 04/01/24 117/44     Labs:   Lab Results   Component Value Date/Time    GLU 92 12/07/2024 10:02 AM     12/07/2024 10:02 AM    K 4.1 12/07/2024 10:02 AM     12/07/2024 10:02 AM    CO2 25.0 12/07/2024 10:02 AM    CREATSERUM 0.81 12/07/2024 10:02 AM    CA 9.7 12/07/2024 10:02 AM    AST 36 (H) 12/07/2024 10:02 AM    ALT 63 (H) 12/07/2024 10:02 AM    TSH 1.243 12/07/2024 10:02 AM        Lab Results   Component Value Date/Time    CHOLEST 180 12/07/2024 10:02 AM    HDL 40 12/07/2024 10:02 AM    TRIG 101 12/07/2024 10:02 AM     (H) 12/07/2024 10:02 AM    NONHDLC 140 (H) 12/07/2024 10:02 AM            Wt Readings from Last 3 Encounters:   12/16/24 239 lb (108.4 kg)   12/04/24 245 lb (111.1 kg)   04/01/24 249 lb (112.9 kg)     BP Readings from Last 3 Encounters:   12/16/24 110/70   12/04/24 107/66   04/01/24 117/44     Labs:   Lab Results   Component Value Date/Time    GLU 92 12/07/2024 10:02 AM     12/07/2024 10:02 AM    K 4.1 12/07/2024 10:02 AM     12/07/2024 10:02 AM    CO2 25.0 12/07/2024 10:02 AM    CREATSERUM 0.81 12/07/2024 10:02 AM    CA 9.7 12/07/2024 10:02 AM    AST 36 (H) 12/07/2024 10:02 AM    ALT 63 (H) 12/07/2024 10:02 AM    TSH 1.243 12/07/2024 10:02 AM        Lab Results   Component Value Date/Time    CHOLEST 180 12/07/2024 10:02 AM    HDL 40 12/07/2024 10:02 AM    TRIG 101  12/07/2024 10:02 AM     (H) 12/07/2024 10:02 AM    NONHDLC 140 (H) 12/07/2024 10:02 AM          Cancer treatment X 5 yrs. was seen every 3 months.  Now will be going down to every 6 months.    R hand tremor X 2 months. When doing something. R handed. No weakness. No notices more when does something FHx.  Occasionally notices it.  Not consistent.    Hand Pain   Associated symptoms include an inability to bear weight, joint swelling, a limited range of motion and stiffness. Pertinent negatives include no fever, itching, joint locking, numbness or tingling. Family history does not include gout or rheumatoid arthritis. There is no history of diabetes, gout, osteoarthritis or rheumatoid arthritis.   Knee Pain   The pain is present in the right knee. This is a new problem. The current episode started more than 1 month ago. There has been no history of extremity trauma (Does squats for owrk out.). The quality of the pain is described as aching. The pain is at a severity of 5/10. Associated symptoms include an inability to bear weight, joint swelling, a limited range of motion and stiffness. Pertinent negatives include no fever, itching, joint locking, numbness or tingling. The symptoms are aggravated by activity. She has tried nothing for the symptoms. Family history does not include gout or rheumatoid arthritis. There is no history of diabetes, gout, osteoarthritis or rheumatoid arthritis.         Review of Systems   Constitutional: Negative.  Negative for activity change, appetite change, chills, diaphoresis, fatigue, fever and unexpected weight change.   HENT:  Negative for congestion, dental problem, drooling, ear discharge, ear pain, facial swelling, mouth sores, nosebleeds, postnasal drip, rhinorrhea, sinus pressure, sneezing, sore throat, trouble swallowing and voice change.    Eyes:  Negative for photophobia, pain, discharge, redness, itching and visual disturbance.   Respiratory: Negative.  Negative for  apnea, cough, choking, chest tightness, shortness of breath, wheezing and stridor.    Cardiovascular: Negative.  Negative for chest pain, palpitations and leg swelling.   Gastrointestinal:  Negative for abdominal distention, abdominal pain and nausea.   Endocrine: Negative for cold intolerance, heat intolerance, polydipsia, polyphagia and polyuria.   Musculoskeletal:  Positive for stiffness. Negative for gout and neck pain.   Skin:  Negative for itching.   Neurological:  Positive for tremors. Negative for dizziness, tingling, seizures, syncope, facial asymmetry, speech difficulty, weakness, light-headedness, numbness and headaches.   Hematological:  Negative for adenopathy.   Psychiatric/Behavioral:  Positive for agitation. Negative for behavioral problems, confusion, decreased concentration, dysphoric mood, hallucinations, self-injury, sleep disturbance and suicidal ideas. The patient is nervous/anxious. The patient is not hyperactive.         Not as much.    All other systems reviewed and are negative.        Current Outpatient Medications   Medication Sig Dispense Refill    Cholecalciferol (VITAMIN D3) 10 MCG (400 UNIT) Oral Cap Take by mouth.      Lactobacillus (PROBIOTIC ACIDOPHILUS) Oral Cap Take by mouth.      PEG 3350-KCl-Na Bicarb-NaCl (TRILYTE) 420 g Oral Recon Soln Take prep as directed by gastro office. May substitute with Trilyte/generic equivalent if needed. 4000 mL 0     Allergies:Allergies[1]    HISTORY:  Past Medical History:    Cancer (HCC)    Brreast Cancer    Viral conjunctivitis of both eyes      Past Surgical History:   Procedure Laterality Date    Biopsy of breast, needle core Left 04/19/2019    Colonoscopy  2009    Ir varicose vein endovenous laser ablation(cpt=36478) Bilateral 2019    Lasik Bilateral 2012    Mirena, iud  02/13/2019    Tonsillectomy  1984      Family History   Problem Relation Age of Onset    Other (Other) Father         unsure if cancer.    Glaucoma Maternal Grandmother      Heart Disorder Maternal Grandmother     Colon Cancer Maternal Grandmother 65    Cancer Maternal Grandmother         colon cancer    Prostate Cancer Paternal Grandfather     Other (apnea) Brother     Diabetes Neg     Macular degeneration Neg       Social History:   Social History     Socioeconomic History    Marital status:     Number of children: 1   Occupational History    Occupation:    Tobacco Use    Smoking status: Never    Smokeless tobacco: Never   Substance and Sexual Activity    Alcohol use: Yes     Alcohol/week: 2.0 standard drinks of alcohol     Types: 2 Glasses of wine per week    Drug use: Never    Sexual activity: Yes     Partners: Male        PHYSICAL EXAM:   /70 (BP Location: Right arm, Patient Position: Sitting, Cuff Size: adult)   Pulse 72   Temp 97.8 °F (36.6 °C) (Tympanic)   Ht 5' 9\" (1.753 m)   Wt 239 lb (108.4 kg)   SpO2 96%   BMI 35.29 kg/m²   BP Readings from Last 3 Encounters:   12/16/24 110/70   12/04/24 107/66   04/01/24 117/44     Wt Readings from Last 3 Encounters:   12/16/24 239 lb (108.4 kg)   12/04/24 245 lb (111.1 kg)   04/01/24 249 lb (112.9 kg)       Physical Exam  Vitals and nursing note reviewed.   Constitutional:       General: She is not in acute distress.     Appearance: Normal appearance. She is well-developed. She is not ill-appearing, toxic-appearing or diaphoretic.      Interventions: She is not intubated.  Neck:      Thyroid: No thyroid mass or thyromegaly.      Trachea: Trachea and phonation normal.   Cardiovascular:      Rate and Rhythm: Normal rate and regular rhythm.      Pulses: Normal pulses. No decreased pulses.           Carotid pulses are 2+ on the right side and 2+ on the left side.       Radial pulses are 2+ on the right side and 2+ on the left side.        Dorsalis pedis pulses are 2+ on the right side and 2+ on the left side.        Posterior tibial pulses are 2+ on the right side and 2+ on the left side.      Heart sounds: Normal  heart sounds, S1 normal and S2 normal.   Pulmonary:      Effort: Pulmonary effort is normal. No tachypnea, bradypnea, accessory muscle usage, prolonged expiration, respiratory distress or retractions. She is not intubated.      Breath sounds: Normal breath sounds and air entry. No stridor, decreased air movement or transmitted upper airway sounds. No decreased breath sounds, wheezing, rhonchi or rales.   Chest:      Chest wall: No tenderness.   Abdominal:      General: There is no distension.      Palpations: Abdomen is soft.      Tenderness: There is no abdominal tenderness.   Musculoskeletal:      Right knee: Swelling, effusion and crepitus present. No deformity, erythema, ecchymosis, lacerations or bony tenderness. Normal range of motion. Tenderness present. No patellar tendon tenderness. Normal alignment and normal patellar mobility.      Instability Tests: Anterior drawer test negative. Posterior drawer test negative.      Right lower leg: No edema.      Left lower leg: No edema.      Right ankle: No swelling, deformity, ecchymosis or lacerations. No tenderness. Normal range of motion. Anterior drawer test negative. Normal pulse.      Right Achilles Tendon: No tenderness or defects. Wagner's test negative.        Legs:    Skin:     General: Skin is warm and dry.   Neurological:      General: No focal deficit present.      Mental Status: She is alert and oriented to person, place, and time.      Cranial Nerves: No dysarthria or facial asymmetry.      Motor: No tremor or seizure activity.      Coordination: Coordination is intact. Romberg sign negative. Coordination normal. Finger-Nose-Finger Test normal.      Gait: Gait is intact. Gait and tandem walk normal.      Comments: No cogwheel rigidity.  Normal arm swing with walking.   Psychiatric:         Behavior: Behavior normal. Behavior is cooperative.         Thought Content: Thought content normal.              ASSESSMENT/PLAN:     Encounter Diagnoses   Name  Primary?    High cholesterol Check blood.    Yes    Hyperparathyroidism (HCC) Check scan.       Iron deficiency anemia due to chronic blood loss Check blood in 3 months. Fu GI for EGD.        Immunization counseling Recommend shingles vaccine.  There is 2 doses of the vaccine  by 2 months 6 months apart.  Check on insurance coverage on shingles vaccine.  May be covered better at the pharmacy.  Separate shingles vaccine from all of the vaccines by at least 1 to 2 weeks.  Discussed about side effects of vaccine. Holding covid booster and flu vaccines.        Colon cancer screening Scheduled.        Elevated liver enzymes Check blood in 3 months and US.        Elevated alkaline phosphatase level Awaiting parathyroid scan.       Weight loss. Losing weight. Sees weight loss clinic.     Tremors. Hold treament. Check on family history. Monitor.     R knee pain. Check Xrays. Try capasacium with ice with gloves up to 2 times a day. Limited excercises.        Orders Placed This Encounter   Procedures    CBC With Differential With Platelet    Iron And Tibc    Ferritin       Meds This Visit:  Requested Prescriptions      No prescriptions requested or ordered in this encounter       Imaging & Referrals:  [facility]STR[facility] - INTERNAL  XR KNEE (1 OR 2 VIEWS), RIGHT (CPT=73560)      Has set kain't.          [1] No Known Allergies

## 2024-12-23 ENCOUNTER — HOSPITAL ENCOUNTER (OUTPATIENT)
Dept: GENERAL RADIOLOGY | Age: 49
Discharge: HOME | End: 2024-12-23
Attending: INTERNAL MEDICINE
Payer: COMMERCIAL

## 2024-12-23 DIAGNOSIS — M25.561 CHRONIC PAIN OF RIGHT KNEE: ICD-10-CM

## 2024-12-23 DIAGNOSIS — G89.29 CHRONIC PAIN OF RIGHT KNEE: ICD-10-CM

## 2024-12-23 PROCEDURE — 73560 X-RAY EXAM OF KNEE 1 OR 2: CPT | Performed by: INTERNAL MEDICINE

## 2024-12-31 ENCOUNTER — TELEPHONE (OUTPATIENT)
Dept: INTERNAL MEDICINE CLINIC | Facility: CLINIC | Age: 49
End: 2024-12-31

## 2024-12-31 NOTE — TELEPHONE ENCOUNTER
Patient called asking if referral information for GI doctor, orthopedic doctor and the physical therapy scheduling sent in [facility]. Message sent

## 2024-12-31 NOTE — TELEPHONE ENCOUNTER
Patient was also advised results of knee x-ray    Karis Ruff MD  12/23/2024  7:39 PM CST       X-ray of the knee shows moderate osteoarthritis changes can follow-up with orthopedics or try a trial of physical therapy and follow-up.  Orders placed.

## 2025-01-13 ENCOUNTER — TELEPHONE (OUTPATIENT)
Facility: CLINIC | Age: 50
End: 2025-01-13

## 2025-01-13 DIAGNOSIS — R14.2 BELCHING: Primary | ICD-10-CM

## 2025-01-13 DIAGNOSIS — R10.84 GENERALIZED ABDOMINAL PAIN: ICD-10-CM

## 2025-01-13 NOTE — TELEPHONE ENCOUNTER
Patient is requesting to add an Esophagogastroduodenoscopy for the same day as her Colonoscopy.   Patient is scheduled 4/21/25.  Patient states that she saw her PCP and he requested it be add the procedure.  Patient was unable to give reason why she needed to add but requested Dr Ruff be contacted or to review 12/16/24 physical notes.  Please call

## 2025-01-14 NOTE — TELEPHONE ENCOUNTER
I returned the pts call.  She denies gerd, dysphagia, n/v, unintentional weight loss.  Has had mild, intermittent mid abd pain. Pain seemed to start after straining to have bm. Has been taking metamucil with daily bm.  No brb, melena. Is sometimes worse after eating  Has belching.     Us abd limited 12/7/24    CONCLUSION:      1. Increased echogenicity and heterogeneity of the liver compatible with fatty infiltration/intrinsic liver disease.      2. Cholelithiasis with no secondary signs of acute cholecystitis.      3. Poor visualization of the pancreas.       Recommend:  complete elastography per pcp  Repeat labs per pcp  Continue fiber and miralax as needed  Squatty potty  Reflux diet modification  Hpylori testing  Pepcid as needed  Cln/egd for work-up bryanna  Er if worsening symptoms

## 2025-01-14 NOTE — TELEPHONE ENCOUNTER
Stacie/Dr. Sanchez-    I tried calling patient to get more information regarding wanting to add on an EGD to colonoscopy procedure on 4/21/2025    Patient did not answer, left message for patient to call back    Per message below, patient saw Dr. Ruff on 12/16/2024 and she recommended it be added on (See OV note from 12/16/2024)    Please advise    Thank you

## 2025-01-20 ENCOUNTER — OFFICE VISIT (OUTPATIENT)
Dept: ORTHOPEDICS CLINIC | Facility: CLINIC | Age: 50
End: 2025-01-20
Payer: COMMERCIAL

## 2025-01-20 VITALS — SYSTOLIC BLOOD PRESSURE: 93 MMHG | HEART RATE: 73 BPM | DIASTOLIC BLOOD PRESSURE: 63 MMHG

## 2025-01-20 DIAGNOSIS — M23.91 INTERNAL DERANGEMENT OF RIGHT KNEE: ICD-10-CM

## 2025-01-20 DIAGNOSIS — M23.300 DEGENERATIVE TEAR OF LATERAL MENISCUS, RIGHT: ICD-10-CM

## 2025-01-20 DIAGNOSIS — E66.812 CLASS 2 SEVERE OBESITY DUE TO EXCESS CALORIES WITH SERIOUS COMORBIDITY AND BODY MASS INDEX (BMI) OF 35.0 TO 35.9 IN ADULT (HCC): ICD-10-CM

## 2025-01-20 DIAGNOSIS — M17.11 PRIMARY OSTEOARTHRITIS OF RIGHT KNEE: Primary | ICD-10-CM

## 2025-01-20 DIAGNOSIS — E66.01 CLASS 2 SEVERE OBESITY DUE TO EXCESS CALORIES WITH SERIOUS COMORBIDITY AND BODY MASS INDEX (BMI) OF 35.0 TO 35.9 IN ADULT (HCC): ICD-10-CM

## 2025-01-20 RX ORDER — TRIAMCINOLONE ACETONIDE 40 MG/ML
40 INJECTION, SUSPENSION INTRA-ARTICULAR; INTRAMUSCULAR ONCE
Status: COMPLETED | OUTPATIENT
Start: 2025-01-20 | End: 2025-01-20

## 2025-01-20 NOTE — PROGRESS NOTES
Per verbal order from Dr. Mckeon, draw up 5ml of 0.5% Marcaine and 1ml of Kenalog 40 for cortisone injection to right shoulder Kelsea DINO MANZO  Patient provided education handout for cortisone injection.

## 2025-01-20 NOTE — H&P
NURSING INTAKE COMMENTS:   Chief Complaint   Patient presents with    Consult     Pt. Complains of right knee pain x 1 year. Per. Pt. She feels like her right knee is bone to bone, pain gets worse with exercise or any type of bending activities. Rating pain 5/10. Denies any recent injuries.        HPI: This 49 year old female presents today with right knee pain in the lateral joint line predominately for about 1 year.  There was no specific trauma.  She also has crunching of both kneecaps.    She likes to exercise 3-4 times a week.  She is doing free weight squats.  She works in an office in human resources.  She lives in a condo with an elevator with her 28-year-old daughter.  She drives and does not use a cane or walker.  She denies diabetes.  She is trying to lose weight and current BMI is 36.  Her breast cancer is in remission.    Past Medical History:    Cancer (HCC)    Brreast Cancer    Viral conjunctivitis of both eyes     Past Surgical History:   Procedure Laterality Date    Biopsy of breast, needle core Left 04/19/2019    Colonoscopy  2009    Ir varicose vein endovenous laser ablation(cpt=36478) Bilateral 2019    Lasik Bilateral 2012    Mirena, iud  02/13/2019    Tonsillectomy  1984     Current Outpatient Medications   Medication Sig Dispense Refill    Cholecalciferol (VITAMIN D3) 10 MCG (400 UNIT) Oral Cap Take by mouth.      Lactobacillus (PROBIOTIC ACIDOPHILUS) Oral Cap Take by mouth.      PEG 3350-KCl-Na Bicarb-NaCl (TRILYTE) 420 g Oral Recon Soln Take prep as directed by gastro office. May substitute with Trilyte/generic equivalent if needed. (Patient not taking: Reported on 1/20/2025) 4000 mL 0     Allergies[1]  Family History   Problem Relation Age of Onset    Other (Other) Father         unsure if cancer.    Glaucoma Maternal Grandmother     Heart Disorder Maternal Grandmother     Colon Cancer Maternal Grandmother 65    Cancer Maternal Grandmother         colon cancer    Prostate Cancer Paternal  Grandfather     Other (apnea) Brother     Diabetes Neg     Macular degeneration Neg      No family Hx of DVT/PE    Social History     Occupational History    Occupation:    Tobacco Use    Smoking status: Never    Smokeless tobacco: Never   Vaping Use    Vaping status: Never Used   Substance and Sexual Activity    Alcohol use: Yes     Alcohol/week: 2.0 standard drinks of alcohol     Types: 2 Glasses of wine per week    Drug use: Never    Sexual activity: Yes     Partners: Male        Review of Systems:  GENERAL: feels generally well, no significant weight loss or weight gain  SKIN: no ulcerated or worrisome skin lesions  EYES:denies blurred vision or double vision  HEENT: denies new nasal congestion, sinus pain or ST  LUNGS: denies shortness of breath  CARDIOVASCULAR: denies chest pain  GI: no hematemesis, no worsening heartburn, no diarrhea  : no dysuria, no blood in urine, no difficulty urinating, no incontinence  MUSCULOSKELETAL: no other musculoskeletal complaints other than in HPI  NEURO: no numbness or tingling, no weakness or balance disorder  PSYCHE: no depression or anxiety  HEMATOLOGIC: no hx of blood dyscrasia, no Hx DVT/PE  ENDOCRINE: no thyroid or diabetes issues  ALL/ASTHMA: no new hx of severe allergy or asthma    Physical Examination:    BP 93/63   Pulse 73   Constitutional: appears well hydrated, alert and responsive, no acute distress noted  Extremities: Right knee without asymmetric warmth or effusion.  Right leg with healthy skin without pitting edema or calf tenderness.  Musculoskeletal: Good motion both knees 0 to 125 degrees.  The left knee has actually worse patellofemoral crepitus both audible and palpable but no tenderness.  The right knee has patellofemoral crepitus but no pain.  No medial joint line tenderness on the right side.  Only lateral joint line tenderness on flexion.  Yovana's tubercle and the hamstrings were nontender.  The fibular head was nontender.  No instability  to the cruciate or collateral ligaments.  Neurological: Normal motor and sensory right lower extremity.    Imaging: X-rays of the right knee show a little bit of joint space narrowing of the patellofemoral joint but the medial and lateral compartments are fairly well-preserved.  Tiny osteophytes.      XR KNEE (1 OR 2 VIEWS), RIGHT (CPT=73560)    Result Date: 12/23/2024  PROCEDURE: XR KNEE (1 OR 2 VIEWS), RIGHT (CPT=73560)  COMPARISON: [facility], X KNEES TALI, 10/20/2014, 6:35 PM.  INDICATIONS: Chronic pain of right knee. No injury.  TECHNIQUE: 2 views were obtained.   FINDINGS:   Bone mineralization is normal.  There is no acute fracture/dislocation.  There are moderate degenerative changes within the right knee manifested by minimal bony hypertrophy, subarticular sclerosis, and tibial spine sharpening          CONCLUSION: Moderate degenerative changes    Dictated by (CST): Jovanny Becerra MD on 12/23/2024 at 3:45 PM     Finalized by (CST): Jovanny Becerra MD on 12/23/2024 at 3:46 PM             Lab Results   Component Value Date    WBC 4.4 11/30/2023    HGB 13.6 11/30/2023     11/30/2023      Lab Results   Component Value Date    GLU 92 12/07/2024    BUN 10 12/07/2024    CREATSERUM 0.81 12/07/2024    GFRNAA 81 11/30/2023    GFRAA 89 06/26/2020        Assessment and Plan:  Diagnoses and all orders for this visit:    Primary osteoarthritis of right knee  -     Arthrocentesis aspiration and injection major Right joint bursa w/o US  -     triamcinolone acetonide (Kenalog-40) 40 MG/ML injection 40 mg  -     PHYSICAL THERAPY - INTERNAL    Internal derangement of right knee  -     Arthrocentesis aspiration and injection major Right joint bursa w/o US  -     triamcinolone acetonide (Kenalog-40) 40 MG/ML injection 40 mg  -     PHYSICAL THERAPY - INTERNAL    Class 2 severe obesity due to excess calories with serious comorbidity and body mass index (BMI) of 35.0 to 35.9 in adult  (HCC)    Degenerative tear of lateral meniscus, right  -     Arthrocentesis aspiration and injection major Right joint bursa w/o US  -     triamcinolone acetonide (Kenalog-40) 40 MG/ML injection 40 mg  -     PHYSICAL THERAPY - INTERNAL        Assessment: Patellofemoral chondromalacia that is asymptomatic but likely has a degenerative lateral meniscus tear.    Plan: I recommended continued weight loss, cortisone injection, and physical therapy.  She agreed to all of the above.  She said she did not need a dietitian since she is already underweight with the weight loss.  She is not diabetic.  She wished to proceed with cortisone.  Aspiration of the right knee was negative and she tolerated the injection of bupivacaine 0.5% 5 cc and Kenalog 1 cc without issue.    She will do the home exercise program that she learns in therapy.  She will assess her self in a month.  If she is not improved, she will call the office and we will get MRI right knee and follow in office results.  If she is improved, I will see her as needed.    Follow Up: No follow-ups on file.    Frank Mckeon MD         [1] No Known Allergies

## 2025-01-21 ENCOUNTER — HOSPITAL ENCOUNTER (OUTPATIENT)
Dept: ULTRASOUND IMAGING | Age: 50
Discharge: HOME | End: 2025-01-21
Attending: INTERNAL MEDICINE
Payer: COMMERCIAL

## 2025-01-21 DIAGNOSIS — R74.8 ELEVATED ALKALINE PHOSPHATASE LEVEL: ICD-10-CM

## 2025-01-21 DIAGNOSIS — R74.8 ELEVATED LIVER ENZYMES: ICD-10-CM

## 2025-01-21 PROCEDURE — 76981 USE PARENCHYMA: CPT | Performed by: INTERNAL MEDICINE

## 2025-01-21 PROCEDURE — 76705 ECHO EXAM OF ABDOMEN: CPT | Performed by: INTERNAL MEDICINE

## 2025-01-23 ENCOUNTER — PATIENT MESSAGE (OUTPATIENT)
Dept: INTERNAL MEDICINE CLINIC | Facility: CLINIC | Age: 50
End: 2025-01-23

## 2025-01-24 ENCOUNTER — OFFICE VISIT (OUTPATIENT)
Dept: RHEUMATOLOGY | Facility: CLINIC | Age: 50
End: 2025-01-24
Payer: COMMERCIAL

## 2025-01-24 ENCOUNTER — PATIENT MESSAGE (OUTPATIENT)
Dept: INTERNAL MEDICINE CLINIC | Facility: CLINIC | Age: 50
End: 2025-01-24

## 2025-01-24 VITALS
WEIGHT: 234 LBS | HEART RATE: 79 BPM | BODY MASS INDEX: 34.66 KG/M2 | DIASTOLIC BLOOD PRESSURE: 72 MMHG | SYSTOLIC BLOOD PRESSURE: 107 MMHG | HEIGHT: 69 IN

## 2025-01-24 DIAGNOSIS — M19.042 OSTEOARTHRITIS OF BOTH HANDS, UNSPECIFIED OSTEOARTHRITIS TYPE: Primary | ICD-10-CM

## 2025-01-24 DIAGNOSIS — M19.041 OSTEOARTHRITIS OF BOTH HANDS, UNSPECIFIED OSTEOARTHRITIS TYPE: Primary | ICD-10-CM

## 2025-01-24 PROCEDURE — 99214 OFFICE O/P EST MOD 30 MIN: CPT | Performed by: INTERNAL MEDICINE

## 2025-01-24 RX ORDER — HYDROCORTISONE ACETATE 0.5 %
1 CREAM (GRAM) TOPICAL DAILY
COMMUNITY
Start: 2024-11-04

## 2025-01-24 NOTE — TELEPHONE ENCOUNTER
Spoke with patient and results reviewed.  Patient is asking now that the imaging results are back if she needs to complete all the additional labs ordered besides cholesterol. Please advise.    Patient asking to clarify what Stacie from GI ordered for her, advised H pylori testing. Patient asking for number to schedule this and number for surgeon. Patient provided contact numbers in my chart message. Patient states she already has GI's phone number.

## 2025-01-24 NOTE — PROGRESS NOTES
RHEUMATOLOGY CLINIC- NEW PATIENT    Karla Du is a 49 year old female.    ASSESSMENT/PLAN:       ICD-10-CM    1. Osteoarthritis of both hands, unspecified osteoarthritis type  M19.041 Physical Therapy Referral - [facility]    M19.042         DISCUSSION:  Patient presents as a new outpatient referral for bilateral thumb pain with bony hypertrophic changes on current exam.  Prior x-rays also reviewed and suggestive of more degenerative, osteoarthritis.  No synovitis on current exam nor other findings suggestive of an underlying inflammatory arthropathy.Prior lab work reviewed and notable for: Negative RF, CCP.  Discussed with patient conservative management, as below.  Referral also given for physical therapy.    PLAN:  -Referral given for physical therapy versus OT  - For pain relief, patient can take Tylenol-arthritis strength at 2 tablets every 8 hours as needed. May also apply topical Voltaren (diclofenac gel) to their affected areas of pain up to 4 times daily  -Patient to contact office if interested in CSI  - Consult/evaluation communicated with referring physician/provider.    Pt will f/u [facility]    Benjie Olivo DO  1/24/2025   2:20 PM      HPI:     1/24/2025 Initial Consult: I had the pleasure of seeing Karla Du on 1/24/2025 as a new outpatient consultation for bilateral thumb pain. The patient was originally referred by her PCP.     49 year old female w/ PMH breast cancer in remission, HLD, hepatic steatosis, varicose veins, who presents to clinic today.  Last year, started having left thumb pain that she initially attributed to texting.  Symptoms described as \"locking \"but then she was worried because she started to have swelling similar to a \"bump \"towards end of her finger.  Then, her right thumb was also affected.  No pain currently but symptoms come and flares without clear trigger.  ROS otherwise negative for unexplained fever, chills, photosensitive rash, Raynaud's  phenomenon, serositis, uveitis/iritis.  No known family history of autoimmune disease such as SLE, gout, or acute arthritis, psoriasis    Current Medications:  N/A    Medication History:  PRN Ibuprofen- helpful    Interval History:   See Above    HISTORY:  Past Medical History:    Cancer (HCC)    Brreast Cancer    Viral conjunctivitis of both eyes      Social Hx Reviewed   Family Hx Reviewed     Medications (Active prior to today's visit):  Current Outpatient Medications   Medication Sig Dispense Refill    Cholecalciferol (VITAMIN D3) 10 MCG (400 UNIT) Oral Cap Take by mouth.      Lactobacillus (PROBIOTIC ACIDOPHILUS) Oral Cap Take by mouth.      PEG 3350-KCl-Na Bicarb-NaCl (TRILYTE) 420 g Oral Recon Soln Take prep as directed by gastro office. May substitute with Trilyte/generic equivalent if needed. (Patient not taking: Reported on 1/20/2025) 4000 mL 0       Allergies:  Allergies[1]      ROS:   Review of Systems   Constitutional:  Negative for chills and fever.   HENT:  Negative for congestion, hearing loss, mouth sores, nosebleeds and trouble swallowing.    Eyes:  Negative for photophobia, pain, redness and visual disturbance.   Respiratory:  Negative for cough and shortness of breath.    Cardiovascular:  Negative for chest pain, palpitations and leg swelling.   Gastrointestinal:  Negative for abdominal pain, blood in stool, diarrhea and nausea.   Endocrine: Negative for cold intolerance and heat intolerance.   Genitourinary:  Negative for dysuria, frequency and hematuria.   Musculoskeletal:  Positive for arthralgias. Negative for back pain, gait problem, joint swelling, myalgias, neck pain and neck stiffness.   Skin:  Negative for color change and rash.   Neurological:  Negative for dizziness, weakness, numbness and headaches.   Psychiatric/Behavioral:  Negative for confusion and dysphoric mood.         PHYSICAL EXAM:     Constitutional:  Well developed, Well nourished, No acute distress  HENT:  Normocephalic,  Atraumatic, Bilateral external ears normal, Oropharynx moist, No oral exudates.  Neck: Normal range of motion, No tenderness, Supple, No stridor.  Eyes:  PERRL, EOMI, Conjunctiva normal, No discharge.  Respiratory:  Normal breath sounds, No respiratory distress, No wheezing.  Cardiovascular:  Normal heart rate, Normal rhythm, No murmurs, No rubs, No gallops.  GI:  Bowel sounds normal, Soft, No tenderness, No masses, No pulsatile masses.  : No CVA tenderness.  Musculoskeletal:  Bony hypertrophic of b/l thumbs. A comprehensive 28 count joint exam was done and was negative for swelling or tenderness except as noted. Inspections for misalignment, asymmetry, crepitation, defects, tenderness, masses, nodules, effusions, range of motion, and stability in the upper and lower extremities bilaterally are all normal unless noted.           Integument:  Warm, Dry, No erythema, No rash.  Lymphatic:  No lymphadenopathy noted.  Neurologic:  Alert & oriented x 3, Normal motor function, Normal sensory function, No focal deficits noted.  Psychiatric:  Affect normal, Judgment normal, Mood normal.    LABS:     Prior lab work reviewed and notable for:     2024:  CRP 0.5 WNL  Antiribosomal P ANAMARIA and anticentromere negative  Hepatitis A IgM negative, hepatitis A, B, and C profile with reactive hepatitis A virus antibody, hepatitis B testing nonreactive  Ferritin 105 WNL  TSH 1.243 WNL    CMP with BUN 10, CR 0.81, ALT 63 (high), AST 36 (high), alk phos 111 (high), no gamma gap    24  CBC w/ normal WBC, Hg, Plt     24  CRP 0.4 WNL, ESR 35 borderline  RF less than 10 WNL, CCP 1 WNL     Imagin/21/25 Liver US:     Impression   CONCLUSION:  1. Hepatic steatosis versus diffuse hepatocellular disease.  2. Suboptimal elastography for technical reasons due to dense hepatic echotexture.  3. Cholelithiasis with gallbladder lumen filled with gallstones.  Negative Loving sign.        24 R Knee XR:     Impression    CONCLUSION: Moderate degenerative changes     2/29/24 Bilateral Hand XR:     L Impression   CONCLUSION:  1. Mild osteoarthritis 1st CMC joint and minimal degenerative changes DIP joints of index and long finger.       R Impression   CONCLUSION:  1. Mild osteoarthritis as described.     10/20/14 Bilateral Knee XR:   CONCLUSION:   1. Mild bilateral patellofemoral degenerative arthrosis.   2. Mild right and minimal left medial compartment degenerative arthrosis.        [1] No Known Allergies

## 2025-01-27 ENCOUNTER — TELEPHONE (OUTPATIENT)
Dept: PHYSICAL THERAPY | Facility: HOSPITAL | Age: 50
End: 2025-01-27

## 2025-01-27 NOTE — TELEPHONE ENCOUNTER
Poke with pt. about results of Liver US. Taking semaglutide. Upper epigastric pain Off. And on Feels like gas X 5 minutes when gets. Goes to bathroom but still with pain. 4 episodes. Nausea. Taye't with GI 5-25 for EGD and colonoscopy. ER if worsening or change in symptoms. Avoid fatty foods. Focusing eatting . Call GI for sooner taye't.

## 2025-01-28 ENCOUNTER — OFFICE VISIT (OUTPATIENT)
Dept: PHYSICAL THERAPY | Age: 50
End: 2025-01-28
Attending: ORTHOPAEDIC SURGERY
Payer: COMMERCIAL

## 2025-01-28 DIAGNOSIS — M23.300 DEGENERATIVE TEAR OF LATERAL MENISCUS, RIGHT: ICD-10-CM

## 2025-01-28 DIAGNOSIS — M17.11 PRIMARY OSTEOARTHRITIS OF RIGHT KNEE: Primary | ICD-10-CM

## 2025-01-28 DIAGNOSIS — M23.91 INTERNAL DERANGEMENT OF RIGHT KNEE: ICD-10-CM

## 2025-01-28 PROCEDURE — 97110 THERAPEUTIC EXERCISES: CPT | Performed by: PHYSICAL THERAPIST

## 2025-01-28 PROCEDURE — 97161 PT EVAL LOW COMPLEX 20 MIN: CPT | Performed by: PHYSICAL THERAPIST

## 2025-01-28 PROCEDURE — 97112 NEUROMUSCULAR REEDUCATION: CPT | Performed by: PHYSICAL THERAPIST

## 2025-01-28 NOTE — PROGRESS NOTES
LOWER EXTREMITY EVALUATION:     Diagnosis:   Primary osteoarthritis of right knee (M17.11)  Internal derangement of right knee (M23.91)  Degenerative tear of lateral meniscus, right (M23.300) Patient:  Karla Du (49 year old, female)        Referring Provider: Frank Mckeon  Today's Date   1/28/2025    Precautions:  None   Date of Evaluation: 01/28/25  Next MD visit: to be scheduled after PT  Date of Surgery: NA     PATIENT SUMMARY   Summary of chief complaints: (R) knee pain with episodic flare ups  History of current condition: Acute episodes of (R) knee pain especially when working out over the past year   Pain level: current 1 /10, at best 1 /10, at worst 7 /10  Description of symptoms: dull ache to anterolateral (R) knee becoming sharp during acute episodes which can last up to 2 weeks   Occupation: human resources; sedentary   Leisure activities/Hobbies: works out regularly   Prior level of function: (I) and active; lives with family and shares homemaking chores  and describes laundry and groceries as heaviest chore; no stairs  Current limitations: difficulty with walking and negotiating stairs especially during acute painful episodes  Pt goals: pain relief  Past medical history was reviewed with Karla.  Significant findings include: non-contributory  Imaging/Tests: refer to radiographic findings 12/23/2024   Karla  has a past medical history of Cancer (HCC) (March 2019) and Viral conjunctivitis of both eyes (11/03/2021).  She  has a past surgical history that includes colonoscopy (2009); tonsillectomy (1984); LASIK (Bilateral, 2012); mirena, iud (02/13/2019); ir varicose vein endovenous laser ablation(cpt=36478) (Bilateral, 2019); and biopsy of breast, needle core (Left, 04/19/2019).    ASSESSMENT  Karla presents to physical therapy evaluation with primary c/o (R) knee pain with episodic flare ups. The results of the objective tests and measures show symptoms and findings consistent with  impared (R)LE biomechanics resulting in valgus collapse in CKC promoted by tightness of hip flexors, quads and ITB and weakness of core and gluteals producing pain in the (R) knee with excessive lateral patellar shift. Functional deficits include but are not limited to difficulty with walking and negotiating stairs especially during acute painful episodes. Signs and symptoms are consistent with diagnosis of Primary osteoarthritis of right knee (M17.11)  Internal derangement of right knee (M23.91)  Degenerative tear of lateral meniscus, right (M23.300). Pt and PT discussed evaluation findings, pathology, POC and HEP.  Pt voiced understanding and performs HEP correctly without reported pain. Skilled Physical Therapy is medically necessary to address the above impairments and reach functional goals.    OBJECTIVE:   Musculoskeletal  Observation: forward head and rounded shoulders; (B) genu recurvatum; flexed at hips  Palpation: tenderness to anterolateral (R) knee   Accessory Motion: WNL     Edema:     Special Tests:(+) Christin Pinto's (B)     TALI ROM WNL and Strength (5/5) except below:  (* denotes performed with pain)  Hip   ROM MMT (-/5)    R L R L     Flex (L2)     4 4+     Ext      3- 3+     Abd     3 4-     ER     3 4-     IR             ,   Knee   ROM MMT (-/5)    R L R L     Flex WNL with ERP WNL 4 5     Ext (L3) WNL WNL 4+ 5     ,   Ankle/Foot   ROM MMT (-/5)    R L R L     PF             DF (L4)  Mod loss Mod loss         Inversion             Eversion             Grt Toe Ext (L5)               Flexibility:  LE Flexibility R L     Hip Flexor mod loss mod loss     Hamstrings WNL WNL     ITB mod loss mod loss     Piriformis         Quads mod loss min loss     Gastroc-soleus mod loss mod loss     Neurological:  Sensation: WNL     Balance and Functional Mobility:  Gait: pt ambulates on level ground with other (use comment) (knee lock/unlock; increased valgus collapse on (R) compared to (L))   Balance: SLS: R 30  sec,  L 30 sec  Functional Mobility:  5x sit to stand test:     TUG:       Today's Treatment and Response:   Pt education was provided on exam findings, treatment diagnosis, treatment plan, expectations, and prognosis.  Today's Treatment       1/28/2025   Treatment   Therapeutic Exercise Blair hip flexor stretch off edge of table 3 x 30 secs  SL ITB stretch 3 x 30 secs  Prone quad stretch 3 x 30 secs  Prone hip ext 2x10  Prone gluteal raises 2x10  SL clamshells 2x10  SL hip add 2x10     Neuro Re-Ed Quad sets with VMO focus  Education on LE biomechanics and influence of tight and weak structures on poor management of valgus forces in CKC   Therapeutic Exercise Min 15   Neuro Re-Ed Min 8   Evaluation Min 20   Total of Timed Procedures 23   Total of Service Based 20   Total Treatment Time 43         Patient was instructed in and issued a HEP for: Refer to patient instructions    Charges:  PT EVAL: Low Complexity, PT eval x 1  In agreement with evaluation findings and clinical rationale, this evaluation involved LOW COMPLEXITY decision making due to no personal factors/comorbidities, 1-2 body structures involved/activity limitations, and stable symptoms as documented in the evaluation.                                                                                                                PLAN OF CARE:    Goals: (to be met in 12 visits)   Goals       Therapy Goals     Karla will demonstrate consistent (R) quad set to promote eccentric quads control and improve neutral knee posture from recurvatum.  Karla will have improved hip flexor, quads and gastrocsoleus flexibility to allow neutral spine and LE posture in standing and CKC activities.  Karla will have improved LE strength to 4+ to 5/5 to allow control of valgus collapse in CKC.  Karla will be (I) with a home program to allow discharge from PT towards (I) self-recovery and maintenance of function.              Frequency / Duration: Patient will be seen  1-2x/week or a total of 12  visits over a 90 day period. Treatment will include: Manual Therapy; Gait training; Neuromuscular Re-education; Therapeutic Activities; Therapeutic Exercise; Home Exercise Program instruction    Education or treatment limitation: None   Rehab Potential: good     LEFS Score  LEFS Score: (Patient-Rptd) 83.75 % (1/28/2025  9:29 AM)      Karla was advised of these findings, precautions, and treatment options and has agreed to actively participate in planning and for this course of care.    Thank you for your referral. Please co-sign or sign and return this letter via fax as soon as possible to [phone]. If you have any questions, please contact me at Dept: [phone]    Sincerely,  Electronically signed by therapist: Thomas Hassan PT  Physician's certification required: Yes  I certify the need for these services furnished under this plan of treatment and while under my care.    X___________________________________________________ Date____________________    Certification From: 1/28/2025  To:4/28/2025

## 2025-01-28 NOTE — TELEPHONE ENCOUNTER
Noted.       Future Appointments   Date Time Provider Department Center   1/28/2025 11:45 AM Thomas Hassan, PT ADO PT [facility] [place]   1/30/2025 11:45 AM Thomas Hassan, PT ADO PT [facility] [place]   2/4/2025 11:45 AM Thomas Hassan, PT ADO PT [facility] [place]   2/11/2025 11:45 AM Thomas Hassan, PT ADO PT [facility] [place]   2/18/2025 11:45 AM Thomas Hassan, PT ADO PT [facility] [place]   2/20/2025 10:00 AM Karis Ruff MD [id number] [place] [id number]   5/19/2025  2:00 PM KAVITHA, PROCEDURE [id number] None

## 2025-01-29 ENCOUNTER — OFFICE VISIT (OUTPATIENT)
Dept: PHYSICAL THERAPY | Age: 50
End: 2025-01-29
Attending: ORTHOPAEDIC SURGERY
Payer: COMMERCIAL

## 2025-01-29 PROCEDURE — 97110 THERAPEUTIC EXERCISES: CPT | Performed by: PHYSICAL THERAPIST

## 2025-01-29 PROCEDURE — 97140 MANUAL THERAPY 1/> REGIONS: CPT | Performed by: PHYSICAL THERAPIST

## 2025-01-29 PROCEDURE — 97112 NEUROMUSCULAR REEDUCATION: CPT | Performed by: PHYSICAL THERAPIST

## 2025-01-29 NOTE — PROGRESS NOTES
Patient: Karla Du (49 year old, female) Referring Provider:  Insurance:   Diagnosis: Primary osteoarthritis of right knee (M17.11)  Internal derangement of right knee (M23.91)  Degenerative tear of lateral meniscus, right (M23.300) Frank HARRISON   Date of Surgery: NA Next MD visit:  N/A   Precautions:  None to be scheduled after PT Referral Information:    Date of Evaluation: Req: 1, Auth: 1, Exp: 1/20/2026 01/28/25 POC Auth Visits:  12       Today's Date   1/29/2025    Subjective  Reports feeling sore since yesterday's session but has also worked out with her  yesterday.       Pain: 2/10     Objective  Requires moderate cues to maintain core engagement; introduced TA isolated contractions and added to HEP.              Assessment  Likely DOMS from targeted exercises focusing on weak core and proximal LE mm. Will need to monitor for duration of post session soreness and adjust as appropriate.  Treatment today include Manual Therapy, Neuromuscular Re-education, and Therapeutic Exercise focusing on core strengthening and restoring flexibility to ITB and hip flexors and noted improvement in TA activation post session. Residual deficits continue to be noted in poor management of valgus forces in CKC and will be addressed with continued skilled interventions.      Goals (to be met in 12 visits)   Goals       Therapy Goals     Karla will demonstrate consistent (R) quad set to promote eccentric quads control and improve neutral knee posture from recurvatum.  Karla will have improved hip flexor, quads and gastrocsoleus flexibility to allow neutral spine and LE posture in standing and CKC activities.  Karla will have improved LE strength to 4+ to 5/5 to allow control of valgus collapse in CKC.  Karla will be (I) with a home program to allow discharge from PT towards (I) self-recovery and maintenance of function.              Plan  Check consistency with TA bracing and progress global  stabilization as appropriate. Try taping.    Treatment Last 4 Visits       1/28/2025 1/29/2025   Treatment   Treatment Day  2   Therapeutic Exercise Blair hip flexor stretch off edge of table 3 x 30 secs  SL ITB stretch 3 x 30 secs  Prone quad stretch 3 x 30 secs  Prone hip ext 2x10  Prone gluteal raises 2x10  SL clamshells 2x10  SL hip add 2x10   Slantboard stretch L3 (B) 3 x 1 min ea  TM retrowalk 12% grade 1mph x 10 mins  SL ITB stretching passive     Neuro Re-Ed Quad sets with VMO focus  Education on LE biomechanics and influence of tight and weak structures on poor management of valgus forces in CKC Prone heel squeeze 2x15 yoga ball b/w ankles;.2nd set with knee flexion  HL TA bracing with self-biofeedback   Manual Therapy  STM to (R) ITB  (R) patellar medial tilt/glides gr 3-4   Therapeutic Exercise Min 15 15   Neuro Re-Ed Min 8 15   Manual Therapy Min  10   Evaluation Min 20    Total of Timed Procedures 23 40   Total of Service Based 20 0   Total Treatment Time 43 40         HEP  Refer to patient instructions.    Charges           Therapeutic exercise x 1; Neuromuscular re-education x 1; Manual Therapy x 1

## 2025-01-30 ENCOUNTER — OFFICE VISIT (OUTPATIENT)
Dept: PHYSICAL THERAPY | Age: 50
End: 2025-01-30
Attending: ORTHOPAEDIC SURGERY
Payer: COMMERCIAL

## 2025-01-30 PROCEDURE — 97112 NEUROMUSCULAR REEDUCATION: CPT | Performed by: PHYSICAL THERAPIST

## 2025-01-30 PROCEDURE — 97110 THERAPEUTIC EXERCISES: CPT | Performed by: PHYSICAL THERAPIST

## 2025-01-30 PROCEDURE — 97140 MANUAL THERAPY 1/> REGIONS: CPT | Performed by: PHYSICAL THERAPIST

## 2025-01-30 NOTE — PROGRESS NOTES
Patient: Karla Du (49 year old, female) Referring Provider:  Insurance:   Diagnosis: Primary osteoarthritis of right knee (M17.11)  Internal derangement of right knee (M23.91)  Degenerative tear of lateral meniscus, right (M23.300) Frank HARRISON   Date of Surgery: NA Next MD visit:  N/A   Precautions:  None to be scheduled after PT Referral Information:    Date of Evaluation: Req: 0, Auth: 0, Exp:     01/28/25 POC Auth Visits:  12       Today's Date   1/30/2025    Subjective  Painfree upon arrival; pain with step down during pre-test       Pain: 2/10     Objective  pain with step downs abolished with [place] taping medial tilt + glide; instructed on tape wear schedule and monitoring for skin integrity.                Assessment  Likely excessive lateral patellar tracking in CKC provoking knee pain and alleviated with [place] taping.  Treatment today include Manual Therapy, Neuromuscular Re-education, and Therapeutic Exercise focusing on improving patellofemoral mechanics to relieve anterior knee pain and noted improvement in abolishment of painful step downs with taping post session. Residual deficits continue to be noted in core and gluteal weakness limiting ability to manage valgus forces in CKC and will be addressed with continued skilled interventions.      Goals (to be met in 12 visits)   Goals       Therapy Goals     Karla will demonstrate consistent (R) quad set to promote eccentric quads control and improve neutral knee posture from recurvatum.  Karla will have improved hip flexor, quads and gastrocsoleus flexibility to allow neutral spine and LE posture in standing and CKC activities.  Karla will have improved LE strength to 4+ to 5/5 to allow control of valgus collapse in CKC.  Karla will be (I) with a home program to allow discharge from PT towards (I) self-recovery and maintenance of function.              Plan  Check carry-over of pain relief with taping and monitor for skin  reaction to tape    Treatment Last 4 Visits       1/28/2025 1/29/2025 1/30/2025   Treatment   Treatment Day  2 3   Therapeutic Exercise Blair hip flexor stretch off edge of table 3 x 30 secs  SL ITB stretch 3 x 30 secs  Prone quad stretch 3 x 30 secs  Prone hip ext 2x10  Prone gluteal raises 2x10  SL clamshells 2x10  SL hip add 2x10   Slantboard stretch L3 (B) 3 x 1 min ea  TM retrowalk 12% grade 1mph x 10 mins  SL ITB stretching passive   Slantboard stretch L4 (B) 3 x 1 min ea  TM retro walk 12% grade with green tband resistance x 10 mins 1mph  Standing hip flexion stretch with rolling stool 3 x 30 secs ea  SB bridging with UE lifts 3x10  HL alt/opp UE/LE ring squeeze 3x10     Neuro Re-Ed Quad sets with VMO focus  Education on LE biomechanics and influence of tight and weak structures on poor management of valgus forces in CKC Prone heel squeeze 2x15 yoga ball b/w ankles;.2nd set with knee flexion  HL TA bracing with self-biofeedback HL TA bracing with ring press and isometric hip add 3x10  HL isometric hip abd with LE lifts 3x10   Manual Therapy  STM to (R) ITB  (R) patellar medial tilt/glides gr 3-4 Patellar mobilization medial tilt/glide Gr 3-4  [place] taping medial tilt+glide   Therapeutic Exercise Min 15 15 30   Neuro Re-Ed Min 8 15 8   Manual Therapy Min  10 8   Evaluation Min 20     Total of Timed Procedures 23 40 46   Total of Service Based 20 0 0   Total Treatment Time 43 40 46         HEP  Refer to patient instructions.    Charges           Therapeutic exercise x 2; Manual Therapy x 1; Neuromuscular re-education x 1

## 2025-02-04 ENCOUNTER — OFFICE VISIT (OUTPATIENT)
Dept: PHYSICAL THERAPY | Age: 50
End: 2025-02-04
Attending: ORTHOPAEDIC SURGERY
Payer: COMMERCIAL

## 2025-02-04 PROCEDURE — 97110 THERAPEUTIC EXERCISES: CPT | Performed by: PHYSICAL THERAPIST

## 2025-02-04 PROCEDURE — 97112 NEUROMUSCULAR REEDUCATION: CPT | Performed by: PHYSICAL THERAPIST

## 2025-02-04 NOTE — PROGRESS NOTES
Patient: Karla Du (50 year old, female) Referring Provider:  Insurance:   Diagnosis: Primary osteoarthritis of right knee (M17.11)  Internal derangement of right knee (M23.91)  Degenerative tear of lateral meniscus, right (M23.300) Frank HARRISON   Date of Surgery: NA Next MD visit:  N/A   Precautions:  None to be scheduled after PT Referral Information:    Date of Evaluation: Req: 0, Auth: 0, Exp:     01/28/25 POC Auth Visits:  12       Today's Date   2/4/2025    Subjective  Reports feeling sore until the end of her work day after her last session but has now been painfree since then. Continues to avoid deep squats.       Pain: 0/10     Objective  Decreased core stability evident with difficulty maintaining pelvic stability with reciprocal resisted LE movements            Assessment  Good relief of symptoms with stretching program and tolerating progression of core strengthening well.  Treatment today include Neuromuscular Re-education and Therapeutic Exercise focusing on core strengthening and noted improvement in carry-over of symptom relief post session. Residual deficits continue to be noted in core weakness with resisted LE movements and will be addressed with continued skilled interventions.      Goals (to be met in 12 visits)   Goals       Therapy Goals     Karla will demonstrate consistent (R) quad set to promote eccentric quads control and improve neutral knee posture from recurvatum.  Karla will have improved hip flexor, quads and gastrocsoleus flexibility to allow neutral spine and LE posture in standing and CKC activities.  Karla will have improved LE strength to 4+ to 5/5 to allow control of valgus collapse in CKC.  Karla will be (I) with a home program to allow discharge from PT towards (I) self-recovery and maintenance of function.              Plan  Continue to monitor for increased tissue irritability post session and response to updated HEP.    Treatment Last 4 Visits        1/28/2025 1/29/2025 1/30/2025 2/4/2025   PT Treatment   Treatment Day  2 3 4   Therapeutic Exercise Blair hip flexor stretch off edge of table 3 x 30 secs  SL ITB stretch 3 x 30 secs  Prone quad stretch 3 x 30 secs  Prone hip ext 2x10  Prone gluteal raises 2x10  SL clamshells 2x10  SL hip add 2x10   Slantboard stretch L3 (B) 3 x 1 min ea  TM retrowalk 12% grade 1mph x 10 mins  SL ITB stretching passive   Slantboard stretch L4 (B) 3 x 1 min ea  TM retro walk 12% grade with green tband resistance x 10 mins 1mph  Standing hip flexion stretch with rolling stool 3 x 30 secs ea  SB bridging with UE lifts 3x10  HL alt/opp UE/LE ring squeeze 3x10   Slantboard stretch L4 (B) 3 x 1 min ea   TM retro walk 12% grade with green tband resistance x 15 mins 1 to 1.2mph  SL clamshells red tband with yoga ball b/w ankles 3x10 ea     Neuro Re-Ed Quad sets with VMO focus  Education on LE biomechanics and influence of tight and weak structures on poor management of valgus forces in CKC Prone heel squeeze 2x15 yoga ball b/w ankles;.2nd set with knee flexion  HL TA bracing with self-biofeedback HL TA bracing with ring press and isometric hip add 3x10  HL isometric hip abd with LE lifts 3x10 HL TA bracing with ring press and isometric hip add abd LE lifts 3x10   HL isometric hip abd with LE lifts 3x10  HL alt/opp UE/LE ring press 3x10   Standing 3 way hip on airex 3x10 red tband around knees  HL bent knee fall out red tband around knees 3x10       Manual Therapy  STM to (R) ITB  (R) patellar medial tilt/glides gr 3-4 Patellar mobilization medial tilt/glide Gr 3-4  [place] taping medial tilt+glide    Therapeutic Exercise Min 15 15 30 23   Neuro Re-Ed Min 8 15 8 23   Manual Therapy Min  10 8    Evaluation Min 20      Total of Timed Procedures 23 40 46 46   Total of Service Based 20 0 0 0   Total Treatment Time 43 40 46 46         HEP  Refer to patient instructions    Charges           Therapeutic exercise x 2; Neuromuscular re-education x  2

## 2025-02-05 ENCOUNTER — NURSE ONLY (OUTPATIENT)
Dept: LAB | Facility: HOSPITAL | Age: 50
End: 2025-02-05
Attending: NURSE PRACTITIONER
Payer: COMMERCIAL

## 2025-02-05 DIAGNOSIS — R10.84 GENERALIZED ABDOMINAL PAIN: ICD-10-CM

## 2025-02-05 DIAGNOSIS — R14.2 BELCHING: ICD-10-CM

## 2025-02-05 PROCEDURE — 83013 H PYLORI (C-13) BREATH: CPT

## 2025-02-06 ENCOUNTER — APPOINTMENT (OUTPATIENT)
Dept: PHYSICAL THERAPY | Age: 50
End: 2025-02-06
Attending: ORTHOPAEDIC SURGERY
Payer: COMMERCIAL

## 2025-02-06 LAB — H PYLORI BREATH TEST: NEGATIVE

## 2025-02-11 ENCOUNTER — APPOINTMENT (OUTPATIENT)
Dept: PHYSICAL THERAPY | Age: 50
End: 2025-02-11
Attending: ORTHOPAEDIC SURGERY
Payer: COMMERCIAL

## 2025-02-12 ENCOUNTER — APPOINTMENT (OUTPATIENT)
Dept: PHYSICAL THERAPY | Age: 50
End: 2025-02-12
Attending: ORTHOPAEDIC SURGERY
Payer: COMMERCIAL

## 2025-02-18 ENCOUNTER — OFFICE VISIT (OUTPATIENT)
Dept: PHYSICAL THERAPY | Age: 50
End: 2025-02-18
Attending: ORTHOPAEDIC SURGERY
Payer: COMMERCIAL

## 2025-02-18 PROBLEM — M85.89 OSTEOPENIA OF MULTIPLE SITES: Status: ACTIVE | Noted: 2025-02-18

## 2025-02-18 PROBLEM — I83.10 VARICOSE VEINS OF LOWER EXTREMITY WITH INFLAMMATION: Status: RESOLVED | Noted: 2020-06-25 | Resolved: 2025-02-18

## 2025-02-18 PROCEDURE — 97140 MANUAL THERAPY 1/> REGIONS: CPT | Performed by: PHYSICAL THERAPIST

## 2025-02-18 PROCEDURE — 97110 THERAPEUTIC EXERCISES: CPT | Performed by: PHYSICAL THERAPIST

## 2025-02-18 NOTE — PROGRESS NOTES
Patient: Karla Du (50 year old, female) Referring Provider:  Insurance:   Diagnosis: Primary osteoarthritis of right knee (M17.11)  Internal derangement of right knee (M23.91)  Degenerative tear of lateral meniscus, right (M23.300) Frank HARRISON   Date of Surgery: NA Next MD visit:  N/A   Precautions:  None to be scheduled after PT Referral Information:    Date of Evaluation: Req: 0, Auth: 0, Exp:     01/28/25 POC Auth Visits:  12       Today's Date   2/18/2025    Subjective  Reports getting pain with squats over anteromedial knee       Pain: 0/10     Objective  (B) valgus collapse with squats and able to achieve painfree range with knees not going past ankles            Assessment  Likely patellofemoral pain from squatting and corrected with proper mechanics  Treatment today include Manual Therapy and Therapeutic Exercise focusing on correcting patellofemoral mechanics and core strengthening and noted improvement in awareness of avoiding increased patellofemoral forces post session. Residual deficits continue to be noted in reproduction of anterior knee pain with squatting and will be addressed with continued skilled interventions.      Goals (to be met in 12 visits)   Goals       Therapy Goals     Karla will demonstrate consistent (R) quad set to promote eccentric quads control and improve neutral knee posture from recurvatum.  Karla will have improved hip flexor, quads and gastrocsoleus flexibility to allow neutral spine and LE posture in standing and CKC activities.  Karla will have improved LE strength to 4+ to 5/5 to allow control of valgus collapse in CKC.  Karla will be (I) with a home program to allow discharge from PT towards (I) self-recovery and maintenance of function.              Plan  Check ability to avoid knee pain with squats with corrections in knee mechanics.    Treatment Last 4 Visits       1/29/2025 1/30/2025 2/4/2025 2/18/2025   PT Treatment   Treatment Day 2 3 4 5    Therapeutic Exercise Slantboard stretch L3 (B) 3 x 1 min ea  TM retrowalk 12% grade 1mph x 10 mins  SL ITB stretching passive   Slantboard stretch L4 (B) 3 x 1 min ea  TM retro walk 12% grade with green tband resistance x 10 mins 1mph  Standing hip flexion stretch with rolling stool 3 x 30 secs ea  SB bridging with UE lifts 3x10  HL alt/opp UE/LE ring squeeze 3x10   Slantboard stretch L4 (B) 3 x 1 min ea   TM retro walk 12% grade with green tband resistance x 15 mins 1 to 1.2mph  SL clamshells red tband with yoga ball b/w ankles 3x10 ea   (R) ITB passive stretching  Slantboard stretch (B) L4 3 x 1 min ea  SB bridging alt with SB hams curls RSB 3x10  HL SB pick ups with pilates 100's 3x10  HL alt UE/LE ring squeeze 3x10  HL UE ring squeeze with bent knee fall outs green tband 3x10 ea  SB wall squats with UE ring squeeze focus on knees not past ankles 3x10   Neuro Re-Ed Prone heel squeeze 2x15 yoga ball b/w ankles;.2nd set with knee flexion  HL TA bracing with self-biofeedback HL TA bracing with ring press and isometric hip add 3x10  HL isometric hip abd with LE lifts 3x10 HL TA bracing with ring press and isometric hip add abd LE lifts 3x10   HL isometric hip abd with LE lifts 3x10  HL alt/opp UE/LE ring press 3x10   Standing 3 way hip on airex 3x10 red tband around knees  HL bent knee fall out red tband around knees 3x10        Manual Therapy STM to (R) ITB  (R) patellar medial tilt/glides gr 3-4 Patellar mobilization medial tilt/glide Gr 3-4  [place] taping medial tilt+glide  (R) patellar mobilization A-P tilt + medial glide   Therapeutic Exercise Min 15 30 23 38   Neuro Re-Ed Min 15 8 23 8   Manual Therapy Min 10 8     Total of Timed Procedures 40 46 46 46   Total of Service Based 0 0 0 0   Total Treatment Time 40 46 46 46         HEP  Refer to patient instructions.    Charges     Therapeutic exercise x 3; Mnaual therapy x 1

## 2025-02-20 ENCOUNTER — APPOINTMENT (OUTPATIENT)
Dept: PHYSICAL THERAPY | Age: 50
End: 2025-02-20
Attending: ORTHOPAEDIC SURGERY
Payer: COMMERCIAL

## 2025-02-20 ENCOUNTER — OFFICE VISIT (OUTPATIENT)
Dept: INTERNAL MEDICINE CLINIC | Facility: CLINIC | Age: 50
End: 2025-02-20
Payer: COMMERCIAL

## 2025-02-20 VITALS
HEART RATE: 67 BPM | BODY MASS INDEX: 34.18 KG/M2 | TEMPERATURE: 98 F | HEIGHT: 69 IN | SYSTOLIC BLOOD PRESSURE: 97 MMHG | DIASTOLIC BLOOD PRESSURE: 64 MMHG | OXYGEN SATURATION: 100 % | WEIGHT: 230.81 LBS

## 2025-02-20 DIAGNOSIS — H43.393 FLOATER, VITREOUS, BILATERAL: ICD-10-CM

## 2025-02-20 DIAGNOSIS — C50.412 MALIGNANT NEOPLASM OF UPPER-OUTER QUADRANT OF LEFT BREAST IN FEMALE, ESTROGEN RECEPTOR POSITIVE (HCC): ICD-10-CM

## 2025-02-20 DIAGNOSIS — H52.203 MYOPIA OF BOTH EYES WITH ASTIGMATISM AND PRESBYOPIA: ICD-10-CM

## 2025-02-20 DIAGNOSIS — R74.8 ELEVATED ALKALINE PHOSPHATASE LEVEL: ICD-10-CM

## 2025-02-20 DIAGNOSIS — H52.4 ASTIGMATISM WITH PRESBYOPIA, BILATERAL: ICD-10-CM

## 2025-02-20 DIAGNOSIS — N91.1 AMENORRHEA, SECONDARY: ICD-10-CM

## 2025-02-20 DIAGNOSIS — H52.4 MYOPIA OF BOTH EYES WITH ASTIGMATISM AND PRESBYOPIA: ICD-10-CM

## 2025-02-20 DIAGNOSIS — Z71.85 IMMUNIZATION COUNSELING: ICD-10-CM

## 2025-02-20 DIAGNOSIS — R74.8 ELEVATED LIVER ENZYMES: ICD-10-CM

## 2025-02-20 DIAGNOSIS — H02.883 MEIBOMIAN GLAND DYSFUNCTION (MGD) OF BOTH EYES: ICD-10-CM

## 2025-02-20 DIAGNOSIS — M19.041 PRIMARY OSTEOARTHRITIS OF BOTH HANDS: ICD-10-CM

## 2025-02-20 DIAGNOSIS — Z12.4 PAP SMEAR FOR CERVICAL CANCER SCREENING: ICD-10-CM

## 2025-02-20 DIAGNOSIS — Z00.00 ADULT GENERAL MEDICAL EXAMINATION: Primary | ICD-10-CM

## 2025-02-20 DIAGNOSIS — H02.886 MEIBOMIAN GLAND DYSFUNCTION (MGD) OF BOTH EYES: ICD-10-CM

## 2025-02-20 DIAGNOSIS — D50.0 IRON DEFICIENCY ANEMIA DUE TO CHRONIC BLOOD LOSS: ICD-10-CM

## 2025-02-20 DIAGNOSIS — M85.89 OSTEOPENIA OF MULTIPLE SITES: ICD-10-CM

## 2025-02-20 DIAGNOSIS — Z98.890 HX OF LASIK: ICD-10-CM

## 2025-02-20 DIAGNOSIS — H52.203 ASTIGMATISM WITH PRESBYOPIA, BILATERAL: ICD-10-CM

## 2025-02-20 DIAGNOSIS — Z12.11 COLON CANCER SCREENING: ICD-10-CM

## 2025-02-20 DIAGNOSIS — Z17.0 MALIGNANT NEOPLASM OF UPPER-OUTER QUADRANT OF LEFT BREAST IN FEMALE, ESTROGEN RECEPTOR POSITIVE (HCC): ICD-10-CM

## 2025-02-20 DIAGNOSIS — E21.3 HYPERPARATHYROIDISM (HCC): ICD-10-CM

## 2025-02-20 DIAGNOSIS — Z12.83 SKIN EXAM, SCREENING FOR CANCER: ICD-10-CM

## 2025-02-20 DIAGNOSIS — E78.00 HIGH CHOLESTEROL: ICD-10-CM

## 2025-02-20 DIAGNOSIS — R06.7 SNEEZING: ICD-10-CM

## 2025-02-20 DIAGNOSIS — H52.13 MYOPIA OF BOTH EYES WITH ASTIGMATISM AND PRESBYOPIA: ICD-10-CM

## 2025-02-20 DIAGNOSIS — M19.042 PRIMARY OSTEOARTHRITIS OF BOTH HANDS: ICD-10-CM

## 2025-02-20 DIAGNOSIS — R63.4 WEIGHT LOSS: ICD-10-CM

## 2025-02-20 LAB
APPEARANCE: CLEAR
BASOPHILS # BLD AUTO: 0.03 X10(3) UL (ref 0–0.2)
BASOPHILS NFR BLD AUTO: 0.7 %
BILIRUBIN: NEGATIVE
DEPRECATED HBV CORE AB SER IA-ACNC: 126 NG/ML
DEPRECATED RDW RBC AUTO: 46.5 FL (ref 35.1–46.3)
EOSINOPHIL # BLD AUTO: 0.1 X10(3) UL (ref 0–0.7)
EOSINOPHIL NFR BLD AUTO: 2.2 %
ERYTHROCYTE [DISTWIDTH] IN BLOOD BY AUTOMATED COUNT: 13.2 % (ref 11–15)
ERYTHROCYTE [SEDIMENTATION RATE] IN BLOOD: 59 MM/HR
FSH SERPL-ACNC: 8.7 MIU/ML
GLUCOSE (URINE DIPSTICK): NEGATIVE MG/DL
HCT VFR BLD AUTO: 43.1 %
HGB BLD-MCNC: 13.9 G/DL
IMM GRANULOCYTES # BLD AUTO: 0 X10(3) UL (ref 0–1)
IMM GRANULOCYTES NFR BLD: 0 %
IRON SATN MFR SERPL: 25 %
IRON SERPL-MCNC: 81 UG/DL
KETONES (URINE DIPSTICK): NEGATIVE MG/DL
LEUKOCYTES: NEGATIVE
LH SERPL-ACNC: 2.9 MIU/ML
LYMPHOCYTES # BLD AUTO: 2.12 X10(3) UL (ref 1–4)
LYMPHOCYTES NFR BLD AUTO: 46 %
MCH RBC QN AUTO: 31 PG (ref 26–34)
MCHC RBC AUTO-ENTMCNC: 32.3 G/DL (ref 31–37)
MCV RBC AUTO: 96.2 FL
MONOCYTES # BLD AUTO: 0.29 X10(3) UL (ref 0.1–1)
MONOCYTES NFR BLD AUTO: 6.3 %
MULTISTIX LOT#: NORMAL NUMERIC
NEUTROPHILS # BLD AUTO: 2.07 X10 (3) UL (ref 1.5–7.7)
NEUTROPHILS # BLD AUTO: 2.07 X10(3) UL (ref 1.5–7.7)
NEUTROPHILS NFR BLD AUTO: 44.8 %
NITRITE, URINE: NEGATIVE
OCCULT BLOOD: NEGATIVE
PH, URINE: 6 (ref 4.5–8)
PLATELET # BLD AUTO: 220 10(3)UL (ref 150–450)
PROTEIN (URINE DIPSTICK): NEGATIVE MG/DL
RBC # BLD AUTO: 4.48 X10(6)UL
SPECIFIC GRAVITY: 1.02 (ref 1–1.03)
TOTAL IRON BINDING CAPACITY: 321 UG/DL (ref 250–425)
TRANSFERRIN SERPL-MCNC: 238 MG/DL (ref 250–380)
URINE-COLOR: YELLOW
UROBILINOGEN,SEMI-QN: 0.2 MG/DL (ref 0–1.9)
WBC # BLD AUTO: 4.6 X10(3) UL (ref 4–11)

## 2025-02-20 PROCEDURE — 99396 PREV VISIT EST AGE 40-64: CPT | Performed by: INTERNAL MEDICINE

## 2025-02-20 PROCEDURE — 81003 URINALYSIS AUTO W/O SCOPE: CPT | Performed by: INTERNAL MEDICINE

## 2025-02-20 PROCEDURE — 36415 COLL VENOUS BLD VENIPUNCTURE: CPT | Performed by: INTERNAL MEDICINE

## 2025-02-20 PROCEDURE — 99213 OFFICE O/P EST LOW 20 MIN: CPT | Performed by: INTERNAL MEDICINE

## 2025-02-20 NOTE — PROGRESS NOTES
HPI:    Patient ID: Karla Du is a 50 year old female.    Karla Du is a 50 year old female who presents for a complete physical exam.   HPI:     Chief Complaint   Patient presents with    Physical     Patient states she is here for an Annual Physical Examination.         No LMP recorded. (Menstrual status: Other).      BP 97/64 (BP Location: Right arm, Patient Position: Sitting, Cuff Size: large)   Pulse 67   Temp 97.5 °F (36.4 °C) (Temporal)   Ht 5' 9\" (1.753 m)   Wt 230 lb 12.8 oz (104.7 kg)   SpO2 100%   BMI 34.08 kg/m²    Wt Readings from Last 4 Encounters:   02/20/25 230 lb 12.8 oz (104.7 kg)   01/24/25 234 lb (106.1 kg)   12/16/24 239 lb (108.4 kg)   12/04/24 245 lb (111.1 kg)     Body mass index is 34.08 kg/m².     Labs:   Lab Results   Component Value Date/Time    WBC 4.4 11/30/2023 12:00 AM    HGB 13.6 11/30/2023 12:00 AM     11/30/2023 12:00 AM      Lab Results   Component Value Date/Time    GLU 92 12/07/2024 10:02 AM     12/07/2024 10:02 AM    K 4.1 12/07/2024 10:02 AM     12/07/2024 10:02 AM    CO2 25.0 12/07/2024 10:02 AM    CREATSERUM 0.81 12/07/2024 10:02 AM    CA 9.7 12/07/2024 10:02 AM    ALB 4.4 12/07/2024 10:02 AM    TP 7.1 12/07/2024 10:02 AM    ALKPHO 111 (H) 12/07/2024 10:02 AM    AST 36 (H) 12/07/2024 10:02 AM    ALT 63 (H) 12/07/2024 10:02 AM    BILT 0.4 12/07/2024 10:02 AM    TSH 1.243 12/07/2024 10:02 AM        Lab Results   Component Value Date/Time    CHOLEST 180 12/07/2024 10:02 AM    HDL 40 12/07/2024 10:02 AM    TRIG 101 12/07/2024 10:02 AM     (H) 12/07/2024 10:02 AM    NONHDLC 140 (H) 12/07/2024 10:02 AM       No results found for: \"A1C\"   Lab Results   Component Value Date    VITD 37.5 12/07/2024         Health Maintenance   Topic Date Due    Mammogram  03/05/2025       Current Outpatient Medications   Medication Sig Dispense Refill    Cholecalciferol (VITAMIN D3) 10 MCG (400 UNIT) Oral Cap Take by mouth.      Lactobacillus  (PROBIOTIC ACIDOPHILUS) Oral Cap Take by mouth.        Past Medical History:    Cancer (HCC)    Brreast Cancer    Viral conjunctivitis of both eyes      Past Surgical History:   Procedure Laterality Date    Biopsy of breast, needle core Left 2019    Colonoscopy  2009    Ir varicose vein endovenous laser ablation(cpt=36478) Bilateral 2019    Lasik Bilateral 2012    Mirena, iud  2019    Tonsillectomy  1984      Family History   Problem Relation Age of Onset    Other (Other) Father         unsure if cancer.    Glaucoma Maternal Grandmother     Heart Disorder Maternal Grandmother     Colon Cancer Maternal Grandmother 65    Cancer Maternal Grandmother         colon cancer    Prostate Cancer Paternal Grandfather     Other (apnea) Brother     Diabetes Neg     Macular degeneration Neg       Social History:  Social History     Socioeconomic History    Marital status:     Number of children: 1   Occupational History    Occupation:    Tobacco Use    Smoking status: Never    Smokeless tobacco: Never   Vaping Use    Vaping status: Never Used   Substance and Sexual Activity    Alcohol use: Yes     Alcohol/week: 2.0 standard drinks of alcohol     Types: 2 Glasses of wine per week    Drug use: Never    Sexual activity: Yes     Partners: Male           GYNE HISTORY:  OB History    Para Term  AB Living   1 1 1 0 0 1   SAB IAB Ectopic Multiple Live Births   0 0 0 0 1        Hx of Pap: all Paps normal          Labs:   Lab Results   Component Value Date/Time    GLU 92 2024 10:02 AM     2024 10:02 AM    K 4.1 2024 10:02 AM     2024 10:02 AM    CO2 25.0 2024 10:02 AM    CREATSERUM 0.81 2024 10:02 AM    CA 9.7 2024 10:02 AM    AST 36 (H) 2024 10:02 AM    ALT 63 (H) 2024 10:02 AM    TSH 1.243 2024 10:02 AM        Lab Results   Component Value Date/Time    CHOLEST 180 2024 10:02 AM    HDL 40 2024 10:02 AM    TRIG  101 12/07/2024 10:02 AM     (H) 12/07/2024 10:02 AM    NONHDLC 140 (H) 12/07/2024 10:02 AM           Doing metamucil 1 q12 hrs. and doing good with regular bowel movements even with the semaglutide.  He is getting compounded semaglutide to pharmacy which pays $400 a month out of pocket.  Not doing through the insurance.  Would like to see a bariatrics doctor.    No LMNP: 10-19. Stopped lupron 10-24. Mild hot flashes.  Hot flashes not as bad as was on Arimidex therapy and Lupron therapy.        Wt Readings from Last 3 Encounters:   02/20/25 230 lb 12.8 oz (104.7 kg)   01/24/25 234 lb (106.1 kg)   12/16/24 239 lb (108.4 kg)     BP Readings from Last 3 Encounters:   02/20/25 97/64   01/24/25 107/72   01/20/25 93/63     Labs:   Lab Results   Component Value Date/Time    GLU 92 12/07/2024 10:02 AM     12/07/2024 10:02 AM    K 4.1 12/07/2024 10:02 AM     12/07/2024 10:02 AM    CO2 25.0 12/07/2024 10:02 AM    CREATSERUM 0.81 12/07/2024 10:02 AM    CA 9.7 12/07/2024 10:02 AM    AST 36 (H) 12/07/2024 10:02 AM    ALT 63 (H) 12/07/2024 10:02 AM    TSH 1.243 12/07/2024 10:02 AM        Lab Results   Component Value Date/Time    CHOLEST 180 12/07/2024 10:02 AM    HDL 40 12/07/2024 10:02 AM    TRIG 101 12/07/2024 10:02 AM     (H) 12/07/2024 10:02 AM    NONHDLC 140 (H) 12/07/2024 10:02 AM            Review of Systems   Constitutional:  Positive for activity change. Negative for appetite change, chills, diaphoresis, fatigue, fever and unexpected weight change.   HENT:  Positive for congestion, sinus pressure and sneezing. Negative for dental problem, drooling, ear discharge, ear pain, facial swelling, hearing loss, mouth sores, nosebleeds, postnasal drip, rhinorrhea, sinus pain, sore throat, tinnitus, trouble swallowing and voice change.         No pets.  Does not like pets.  No construction in the home.  Does not change furnace filter.  But has been sneezing more recently.   Eyes:  Negative for  photophobia, pain, discharge, redness, itching and visual disturbance.   Respiratory:  Negative for apnea, cough, choking, chest tightness, shortness of breath, wheezing and stridor.    Cardiovascular:  Positive for leg swelling (When travels and more s=during summer. Same for yrs. Off and on.). Negative for chest pain and palpitations.   Gastrointestinal:  Negative for abdominal distention, abdominal pain, anal bleeding, blood in stool, constipation, diarrhea, nausea, rectal pain and vomiting.   Endocrine: Negative for cold intolerance, heat intolerance, polydipsia, polyphagia and polyuria.   Genitourinary:  Negative for decreased urine volume, difficulty urinating, dysuria, flank pain, frequency, hematuria, menstrual problem, pelvic pain, urgency, vaginal bleeding, vaginal discharge and vaginal pain.   Skin:  Negative for rash.   Neurological:  Negative for dizziness, tremors, seizures, syncope, facial asymmetry, speech difficulty, weakness, light-headedness, numbness and headaches.   Psychiatric/Behavioral:  Negative for agitation, behavioral problems, confusion, decreased concentration, dysphoric mood, hallucinations, self-injury, sleep disturbance and suicidal ideas. The patient is not nervous/anxious and is not hyperactive.    All other systems reviewed and are negative.        Current Outpatient Medications   Medication Sig Dispense Refill    Cholecalciferol (VITAMIN D3) 10 MCG (400 UNIT) Oral Cap Take by mouth.      Lactobacillus (PROBIOTIC ACIDOPHILUS) Oral Cap Take by mouth.       Allergies:Allergies[1]    HISTORY:  Past Medical History:    Cancer (HCC)    Brreast Cancer    Viral conjunctivitis of both eyes      Past Surgical History:   Procedure Laterality Date    Biopsy of breast, needle core Left 04/19/2019    Colonoscopy  2009    Ir varicose vein endovenous laser ablation(cpt=36478) Bilateral 2019    Lasik Bilateral 2012    Mirena, iud  02/13/2019    Tonsillectomy  1984      Family History   Problem  Relation Age of Onset    Other (Other) Father         unsure if cancer.    Glaucoma Maternal Grandmother     Heart Disorder Maternal Grandmother     Colon Cancer Maternal Grandmother 65    Cancer Maternal Grandmother         colon cancer    Prostate Cancer Paternal Grandfather     Other (apnea) Brother     Diabetes Neg     Macular degeneration Neg       Social History:   Social History     Socioeconomic History    Marital status:     Number of children: 1   Occupational History    Occupation:    Tobacco Use    Smoking status: Never    Smokeless tobacco: Never   Vaping Use    Vaping status: Never Used   Substance and Sexual Activity    Alcohol use: Yes     Alcohol/week: 2.0 standard drinks of alcohol     Types: 2 Glasses of wine per week    Drug use: Never    Sexual activity: Yes     Partners: Male        Exercise level: exercises 3 times a  week (weight training and acrdio. X 1 hr.) and has been following low salt diet.  Weight has been stable. Semaglutide. Signed up for . Cut back on wine. Prep. for week. Eatting .   Wt Readings from Last 3 Encounters:   02/20/25 230 lb 12.8 oz (104.7 kg)   01/24/25 234 lb (106.1 kg)   12/16/24 239 lb (108.4 kg)     BP Readings from Last 3 Encounters:   02/20/25 97/64   01/24/25 107/72   01/20/25 93/63       Labs:   Lab Results   Component Value Date/Time    GLU 92 12/07/2024 10:02 AM     12/07/2024 10:02 AM    K 4.1 12/07/2024 10:02 AM     12/07/2024 10:02 AM    CO2 25.0 12/07/2024 10:02 AM    CREATSERUM 0.81 12/07/2024 10:02 AM    CA 9.7 12/07/2024 10:02 AM    AST 36 (H) 12/07/2024 10:02 AM    ALT 63 (H) 12/07/2024 10:02 AM    TSH 1.243 12/07/2024 10:02 AM        Lab Results   Component Value Date/Time    CHOLEST 180 12/07/2024 10:02 AM    HDL 40 12/07/2024 10:02 AM    TRIG 101 12/07/2024 10:02 AM     (H) 12/07/2024 10:02 AM    NONHDLC 140 (H) 12/07/2024 10:02 AM          PHYSICAL EXAM:   BP 97/64 (BP Location: Right  arm, Patient Position: Sitting, Cuff Size: large)   Pulse 67   Temp 97.5 °F (36.4 °C) (Temporal)   Ht 5' 9\" (1.753 m)   Wt 230 lb 12.8 oz (104.7 kg)   SpO2 100%   BMI 34.08 kg/m²   BP Readings from Last 3 Encounters:   02/20/25 97/64   01/24/25 107/72   01/20/25 93/63     Wt Readings from Last 3 Encounters:   02/20/25 230 lb 12.8 oz (104.7 kg)   01/24/25 234 lb (106.1 kg)   12/16/24 239 lb (108.4 kg)       Physical Exam  Vitals and nursing note reviewed.   Constitutional:       General: She is not in acute distress.     Appearance: Normal appearance. She is well-developed and well-groomed. She is not ill-appearing, toxic-appearing or diaphoretic.      Interventions: She is not intubated.  HENT:      Head: Normocephalic and atraumatic.      Right Ear: Tympanic membrane, ear canal and external ear normal. No decreased hearing noted. No laceration, drainage, swelling or tenderness. No middle ear effusion. There is no impacted cerumen. No foreign body. No mastoid tenderness. No PE tube. No hemotympanum. Tympanic membrane is not injected, scarred, perforated, erythematous, retracted or bulging. Tympanic membrane has normal mobility.      Left Ear: Tympanic membrane, ear canal and external ear normal. No decreased hearing noted. No laceration, drainage, swelling or tenderness.  No middle ear effusion. There is no impacted cerumen. No foreign body. No mastoid tenderness. No PE tube. No hemotympanum. Tympanic membrane is not injected, scarred, perforated, erythematous, retracted or bulging. Tympanic membrane has normal mobility.      Nose:      Right Sinus: No maxillary sinus tenderness or frontal sinus tenderness.      Left Sinus: No maxillary sinus tenderness or frontal sinus tenderness.      Mouth/Throat:      Lips: Pink. No lesions.      Mouth: Mucous membranes are moist. No injury, lacerations, oral lesions or angioedema.      Dentition: Does not have dentures. No dental tenderness, gingival swelling, dental  caries, dental abscesses or gum lesions.      Tongue: No lesions. Tongue does not deviate from midline.      Palate: No mass and lesions.      Pharynx: Oropharynx is clear. Uvula midline. No pharyngeal swelling, oropharyngeal exudate, posterior oropharyngeal erythema, uvula swelling or postnasal drip.      Tonsils: No tonsillar exudate or tonsillar abscesses.   Eyes:      General: Lids are normal. Gaze aligned appropriately. No scleral icterus.        Right eye: No foreign body, discharge or hordeolum.         Left eye: No foreign body, discharge or hordeolum.      Extraocular Movements: Extraocular movements intact.      Right eye: Normal extraocular motion and no nystagmus.      Left eye: Normal extraocular motion and no nystagmus.      Conjunctiva/sclera: Conjunctivae normal.      Right eye: Right conjunctiva is not injected. No chemosis, exudate or hemorrhage.     Left eye: Left conjunctiva is not injected. No chemosis, exudate or hemorrhage.     Pupils: Pupils are equal, round, and reactive to light.   Neck:      Thyroid: No thyroid mass, thyromegaly or thyroid tenderness.      Vascular: Normal carotid pulses. No carotid bruit, hepatojugular reflux or JVD.      Trachea: Trachea and phonation normal. No tracheal tenderness, tracheostomy, abnormal tracheal secretions or tracheal deviation.   Cardiovascular:      Rate and Rhythm: Normal rate and regular rhythm.      Pulses: Normal pulses.           Carotid pulses are 2+ on the right side and 2+ on the left side.       Radial pulses are 2+ on the right side and 2+ on the left side.        Dorsalis pedis pulses are 2+ on the right side and 2+ on the left side.        Posterior tibial pulses are 2+ on the right side and 2+ on the left side.      Heart sounds: Normal heart sounds, S1 normal and S2 normal.   Pulmonary:      Effort: Pulmonary effort is normal. No tachypnea, bradypnea, accessory muscle usage, prolonged expiration, respiratory distress or retractions. She  is not intubated.      Breath sounds: Normal breath sounds and air entry. No stridor, decreased air movement or transmitted upper airway sounds. No decreased breath sounds, wheezing, rhonchi or rales.       Chest:      Chest wall: No tenderness.   Breasts:     Breasts are symmetrical.      Right: No swelling, bleeding, inverted nipple, mass, nipple discharge, skin change or tenderness.      Left: No swelling, bleeding, inverted nipple, mass, nipple discharge, skin change or tenderness.       Abdominal:      General: Bowel sounds are normal. There is no distension.      Palpations: Abdomen is soft. Abdomen is not rigid. There is no fluid wave, hepatomegaly, splenomegaly or mass.      Tenderness: There is no abdominal tenderness. There is no right CVA tenderness, left CVA tenderness, guarding or rebound.   Genitourinary:     Exam position: Supine.   Musculoskeletal:      Cervical back: Neck supple. No tenderness.      Right lower leg: No edema.      Left lower leg: No edema.   Lymphadenopathy:      Head:      Right side of head: No submental, submandibular, preauricular, posterior auricular or occipital adenopathy.      Left side of head: No submental, submandibular, preauricular, posterior auricular or occipital adenopathy.      Cervical: No cervical adenopathy.      Right cervical: No superficial, deep or posterior cervical adenopathy.     Left cervical: No superficial, deep or posterior cervical adenopathy.      Upper Body:      Right upper body: No supraclavicular, axillary or pectoral adenopathy.      Left upper body: No supraclavicular, axillary or pectoral adenopathy.   Skin:     General: Skin is warm and dry.      Coloration: Skin is not pale.      Findings: No erythema or rash.   Neurological:      Mental Status: She is alert and oriented to person, place, and time.   Psychiatric:         Mood and Affect: Mood normal.         Speech: Speech normal.         Behavior: Behavior normal. Behavior is cooperative.               ASSESSMENT/PLAN:     Encounter Diagnoses   Name Primary?    Adult general medical examination Check urine. Check blood.    Yes    Astigmatism with presbyopia, bilateral Stable.  No new vision changes.  Follow-up with ophthalmology.       Colon cancer screening Scheduled for colonoscopy.        Floater, vitreous, bilateral Stable.  No new vision changes.  Follow-up with ophthalmology.       High cholesterol Check blood.        Hx of LASIK no new vision changes.  Follow-up with ophthalmology.       Hyperparathyroidism (HCC) Check blood.  Check scan.       Immunization counseling Holding covid booster. Recommend shingles vaccine.  There is 2 doses of the vaccine  by 2 months 6 months apart.  Check on insurance coverage on shingles vaccine.  May be covered better at the pharmacy.  Separate shingles vaccine from all of the vaccines by at least 1 to 2 weeks.  Discussed about side effects of vaccine.        Iron deficiency anemia due to chronic blood loss Check blood.        Malignant neoplasm of upper-outer quadrant of left breast in female, estrogen receptor positive (HCC) Normal mammogram. Continue self breast exam every month.  Follows up with cancer treatment centers.       Meibomian gland dysfunction (MGD) of both eyes Stable.  No new vision changes.       Myopia of both eyes with astigmatism and presbyopia stable.       Primary osteoarthritis of both hands Stable.  Will check with insurance about physical therapy for hands.       Pap smear for cervical cancer screening Up to date.        Osteopenia of multiple sites Check dexa in 9-5-26. Take calcium 600 every 12 hrs. With vitamin D 400 IU every  12 hrs. Excerise at least 30 minutes 3-4 times a week. May use calcium citrate as opposed to calcium carbonate which may be better absorbed in the setting of PPI use.  The preferred calcium supplement for people at risk of stone formation is calcium citrate because it helps to increase urinary citrate  excretion. We recommend a dose of 200-400 mg if dietary calcium cannot be increased. Calcium and Vit d intake: Yes.   Steroid use,  PPI use, chronic pain medication use, Anti-epileptics use :  RA: No.   Exercise: Yes.   She is postmenopausal Yes?   Maternal history of osteoporosis Yes.   History of falls/ fractures:  No.   History of kidney stones No.     Current Smoking No.   Excess alcohol No.   History of thyroid disease  No.   History of calcium problems:  No.   History of Malabsorption/ bariatric surgery: No.      No extensive dental work in the past: root canals, bridges. She also has TMJ No.   No h/o radiation No.   H/o heart burn: not on a regular basis Yes.   lifelong physical activity at all ages is strongly endorsed by the National Osteopor[employer] Foundation. Exercise recommendations generally should include weight-bearing, muscle-strengthening, and balance training exercises for 30 minutes 5 days per week or 75 minutes twice weekly, often consistent with other general health recommendations.   Weight Bearing  There are two types of osteoporosis exercises that are important for building and maintaining bone density: weight-bearing and muscle-strengthening exercises.  Weight-bearing Exercises  These exercises include activities that make you move against gravity while staying upright. Weight-bearing exercises can be high-impact or low-impact.  High-impact weight-bearing exercises help build bones and keep them strong. If you have broken a bone due to osteoporosis or are at risk of breaking a bone, you may need to avoid high-impact exercises. If you’re not sure, you should check with your healthcare provider.  Examples of high-impact weight-bearing exercises are:  Dancing   Doing high-impact aerobics   Hiking   Jogging/running   Jumping Rope   Stair climbing   Tennis  Low-impact weight-bearing exercises can also help keep bones strong and are a safe alternative if you cannot do high-impact exercises. Examples  of low-impact weight-bearing exercises are:  Using elliptical training machines   Doing low-impact aerobics   Using stair-step machines   Fast walking on a treadmill or outside            Elevated alkaline phosphatase level Check blood.       Weight loss desired. FU bariatrics.     Ameorrhhea. Check blood.  May be more postmenopausal.  Probably aggravated with Arimidex and Lupron injections.    Sneezing. Check blood. use of steroidal nasal spray as advised (Flonase, Rhinocort)-this is now available as a generic, over the counter spray if your insurance doesn't cover it      used every morning faithfully each morning during the allergy season is the BEST treatment; they are very safe for use over a period of 6-7 months (April - October)     -over the counter allergy eye drops-Zaditor (ketotifen) 1 drop twice a day works very well for eye symptoms     -keep windows closed at night     -do not allow pets to sleep in room     -use allergen covers on pillows, mattress and box spring to reduce exposure to dust mites     -shower after outdoor activities, especially when allergy counts are high     -Use of allergen filters for furnace; consider air purifier if allergies are significant at night     -Call  If symptoms do not improve, worsen or with other questions or concerns   ALLERGIC RHINITIS    Some tips to help allergy symptoms:  Treatment:  Flonase or Nasacort used every morning faithfully each morning during the allergy season is the BEST treatment; they are very safe for use over a period of 6-7 months (April - October)  Claritin or Zyrtec or Allegra can be used in conjunction with the nasal spray; take these in the evening  If eyes are involved significantly, use eye drops as needed in addition to above (prescription is best but if not covered, try Zaditor (for age 3 or greater)  General:  Bathe and rinse hair at night after being outside - this rinses allergens off the body so they don't contaminate pillows and  sheets  Keep animals out of the bedroom and off of the bed  Keep windows shut and air conditioning on in the pollen/ragweed season  Use an air purifier for the bedroom  Use higher grade furnace filters to remove more pollen/allergens  If never done or done >5 years ago, consider having your HVAC ducts cleaned professionally  Remove any mold from the home  For dust and dust mite allergies:  Keep indoor humidity at ~30-40%; higher levels can breed dust mites  Keep stuffed animals to a minimum; wash in hot water every month if that is possible. If not, tie them up in an airtight bag for 3 days or place them in a fabric bag and put them in the drier on high for 10 minutes.   Mattress and pillow allergen covers can reduce dust and thus dust mites  Wash bed linens in hot water weekly    Call if symptoms do not improve within 2 weeks or if symptoms worsen     History of gallbladder stones.  Asymptomatic.  Discussed with patient of signs and symptoms to watch for.  Follow-up with general surgery and GI.    Nevi upper back.  Looks benign.  Follow-up with Derm for full skin check once a year.    Orders Placed This Encounter   Procedures    PTH, Intact    URINALYSIS, AUTO, W/O SCOPE    FSH    LH (Luteinizing Hormone)    Testosterone,Total and Weakly Bound w/ SHBG    Estrogens Fractionated, S [E]    Allergen [facility]    [facility]       Meds This Visit:  Requested Prescriptions      No prescriptions requested or ordered in this encounter       Imaging & Referrals:  SURGERY - INTERNAL  BARIATRICS - INTERNAL  DERM - INTERNAL      RTC 6 months for FU.          [1] No Known Allergies

## 2025-02-20 NOTE — PATIENT INSTRUCTIONS
ASSESSMENT/PLAN:     Encounter Diagnoses   Name Primary?    Adult general medical examination Check urine. Check blood.    Yes    Astigmatism with presbyopia, bilateral Stable.  No new vision changes.  Follow-up with ophthalmology.       Colon cancer screening Scheduled for colonoscopy.        Floater, vitreous, bilateral Stable.  No new vision changes.  Follow-up with ophthalmology.       High cholesterol Check blood.        Hx of LASIK no new vision changes.  Follow-up with ophthalmology.       Hyperparathyroidism (HCC) Check blood.  Check scan.       Immunization counseling Holding covid booster. Recommend shingles vaccine.  There is 2 doses of the vaccine  by 2 months 6 months apart.  Check on insurance coverage on shingles vaccine.  May be covered better at the pharmacy.  Separate shingles vaccine from all of the vaccines by at least 1 to 2 weeks.  Discussed about side effects of vaccine.        Iron deficiency anemia due to chronic blood loss Check blood.        Malignant neoplasm of upper-outer quadrant of left breast in female, estrogen receptor positive (HCC) Normal mammogram. Continue self breast exam every month.  Follows up with cancer treatment centers.       Meibomian gland dysfunction (MGD) of both eyes Stable.  No new vision changes.       Myopia of both eyes with astigmatism and presbyopia stable.       Primary osteoarthritis of both hands Stable.  Will check with insurance about physical therapy for hands.       Pap smear for cervical cancer screening Up to date.        Osteopenia of multiple sites Check dexa in 9-5-26. Take calcium 600 every 12 hrs. With vitamin D 400 IU every  12 hrs. Excerise at least 30 minutes 3-4 times a week. May use calcium citrate as opposed to calcium carbonate which may be better absorbed in the setting of PPI use.  The preferred calcium supplement for people at risk of stone formation is calcium citrate because it helps to increase urinary citrate excretion. We  recommend a dose of 200-400 mg if dietary calcium cannot be increased. Calcium and Vit d intake: Yes.   Steroid use,  PPI use, chronic pain medication use, Anti-epileptics use :  RA: No.   Exercise: Yes.   She is postmenopausal Yes?   Maternal history of osteoporosis Yes.   History of falls/ fractures:  No.   History of kidney stones No.     Current Smoking No.   Excess alcohol No.   History of thyroid disease  No.   History of calcium problems:  No.   History of Malabsorption/ bariatric surgery: No.      No extensive dental work in the past: root canals, bridges. She also has TMJ No.   No h/o radiation No.   H/o heart burn: not on a regular basis Yes.   lifelong physical activity at all ages is strongly endorsed by the National Osteopor[employer]. Exercise recommendations generally should include weight-bearing, muscle-strengthening, and balance training exercises for 30 minutes 5 days per week or 75 minutes twice weekly, often consistent with other general health recommendations.   Weight Bearing  There are two types of osteoporosis exercises that are important for building and maintaining bone density: weight-bearing and muscle-strengthening exercises.  Weight-bearing Exercises  These exercises include activities that make you move against gravity while staying upright. Weight-bearing exercises can be high-impact or low-impact.  High-impact weight-bearing exercises help build bones and keep them strong. If you have broken a bone due to osteoporosis or are at risk of breaking a bone, you may need to avoid high-impact exercises. If you’re not sure, you should check with your healthcare provider.  Examples of high-impact weight-bearing exercises are:  Dancing   Doing high-impact aerobics   Hiking   Jogging/running   Jumping Rope   Stair climbing   Tennis  Low-impact weight-bearing exercises can also help keep bones strong and are a safe alternative if you cannot do high-impact exercises. Examples of low-impact  weight-bearing exercises are:  Using elliptical training machines   Doing low-impact aerobics   Using stair-step machines   Fast walking on a treadmill or outside            Elevated alkaline phosphatase level Check blood.       Weight loss desired. FU bariatrics.     Ameorrhhea. Check blood.  May be more postmenopausal.  Probably aggravated with Arimidex and Lupron injections.    Sneezing. Check blood. use of steroidal nasal spray as advised (Flonase, Rhinocort)-this is now available as a generic, over the counter spray if your insurance doesn't cover it      used every morning faithfully each morning during the allergy season is the BEST treatment; they are very safe for use over a period of 6-7 months (April - October)     -over the counter allergy eye drops-Zaditor (ketotifen) 1 drop twice a day works very well for eye symptoms     -keep windows closed at night     -do not allow pets to sleep in room     -use allergen covers on pillows, mattress and box spring to reduce exposure to dust mites     -shower after outdoor activities, especially when allergy counts are high     -Use of allergen filters for furnace; consider air purifier if allergies are significant at night     -Call  If symptoms do not improve, worsen or with other questions or concerns   ALLERGIC RHINITIS    Some tips to help allergy symptoms:  Treatment:  Flonase or Nasacort used every morning faithfully each morning during the allergy season is the BEST treatment; they are very safe for use over a period of 6-7 months (April - October)  Claritin or Zyrtec or Allegra can be used in conjunction with the nasal spray; take these in the evening  If eyes are involved significantly, use eye drops as needed in addition to above (prescription is best but if not covered, try Zaditor (for age 3 or greater)  General:  Bathe and rinse hair at night after being outside - this rinses allergens off the body so they don't contaminate pillows and sheets  Keep  animals out of the bedroom and off of the bed  Keep windows shut and air conditioning on in the pollen/ragweed season  Use an air purifier for the bedroom  Use higher grade furnace filters to remove more pollen/allergens  If never done or done >5 years ago, consider having your HVAC ducts cleaned professionally  Remove any mold from the home  For dust and dust mite allergies:  Keep indoor humidity at ~30-40%; higher levels can breed dust mites  Keep stuffed animals to a minimum; wash in hot water every month if that is possible. If not, tie them up in an airtight bag for 3 days or place them in a fabric bag and put them in the drier on high for 10 minutes.   Mattress and pillow allergen covers can reduce dust and thus dust mites  Wash bed linens in hot water weekly    Call if symptoms do not improve within 2 weeks or if symptoms worsen     History of gallbladder stones.  Asymptomatic.  Discussed with patient of signs and symptoms to watch for.  Follow-up with general surgery and GI.    Nevi upper back.  Looks benign.  Follow-up with Derm for full skin check once a year.    Orders Placed This Encounter   Procedures    PTH, Intact    URINALYSIS, AUTO, W/O SCOPE    FSH    LH (Luteinizing Hormone)    Testosterone,Total and Weakly Bound w/ SHBG    Estrogens Fractionated, S [E]    Allergen [facility]    Allergy Region 8       Meds This Visit:  Requested Prescriptions      No prescriptions requested or ordered in this encounter       Imaging & Referrals:  SURGERY - INTERNAL  BARIATRICS - INTERNAL  DERM - INTERNAL      RTC 6 months for FU.

## 2025-02-21 ENCOUNTER — PATIENT MESSAGE (OUTPATIENT)
Dept: INTERNAL MEDICINE CLINIC | Facility: CLINIC | Age: 50
End: 2025-02-21

## 2025-02-21 LAB
A ALTERNATA IGE QN: <0.1 KUA/L (ref ?–0.1)
A ALTERNATA IGE QN: <0.1 KUA/L (ref ?–0.1)
A FUMIGATUS IGE QN: <0.1 KUA/L (ref ?–0.1)
AMER SYCAMORE IGE QN: <0.1 KUA/L (ref ?–0.1)
BERMUDA GRASS IGE QN: <0.1 KUA/L (ref ?–0.1)
BOXELDER IGE QN: <0.1 KUA/L (ref ?–0.1)
C HERBARUM IGE QN: <0.1 KUA/L (ref ?–0.1)
C HERBARUM IGE QN: <0.1 KUA/L (ref ?–0.1)
CALIF WALNUT IGE QN: <0.1 KUA/L (ref ?–0.1)
CAT DANDER IGE QN: <0.1 KUA/L (ref ?–0.1)
CAT DANDER IGE QN: <0.1 KUA/L (ref ?–0.1)
CMN PIGWEED IGE QN: <0.1 KUA/L (ref ?–0.1)
COMMON RAGWEED IGE QN: <0.1 KUA/L (ref ?–0.1)
COMMON RAGWEED IGE QN: <0.1 KUA/L (ref ?–0.1)
COTTONWOOD IGE QN: <0.1 KUA/L (ref ?–0.1)
D FARINAE IGE QN: <0.1 KUA/L (ref ?–0.1)
D FARINAE IGE QN: <0.1 KUA/L (ref ?–0.1)
D PTERONYSS IGE QN: <0.1 KUA/L (ref ?–0.1)
DOG DANDER IGE QN: <0.1 KUA/L (ref ?–0.1)
DOG DANDER IGE QN: <0.1 KUA/L (ref ?–0.1)
GOOSEFOOT IGE QN: <0.1 KUA/L (ref ?–0.1)
HOUSE DUST HS IGE QN: <0.1 KUA/L (ref ?–0.1)
IGE SERPL-ACNC: 18 KU/L (ref 2–214)
IGE SERPL-ACNC: 18.9 KU/L (ref 2–214)
KENT BLUE GRASS IGE QN: <0.1 KUA/L (ref ?–0.1)
M RACEMOSUS IGE QN: <0.1 KUA/L (ref ?–0.1)
MARSH ELDER IGE QN: <0.1 KUA/L (ref ?–0.1)
MOUSE EPITH IGE QN: <0.1 KUA/L (ref ?–0.1)
MT JUNIPER IGE QN: <0.1 KUA/L (ref ?–0.1)
P NOTATUM IGE QN: <0.1 KUA/L (ref ?–0.1)
PECAN/HICK TREE IGE QN: <0.1 KUA/L (ref ?–0.1)
PER RYE GRASS IGE QN: <0.1 KUA/L (ref ?–0.1)
ROACH IGE QN: <0.1 KUA/L (ref ?–0.1)
SALTWORT IGE QN: <0.1 KUA/L (ref ?–0.1)
SILVER BIRCH IGE QN: <0.1 KUA/L (ref ?–0.1)
TIMOTHY IGE QN: <0.1 KUA/L (ref ?–0.1)
WHITE ASH IGE QN: <0.1 KUA/L (ref ?–0.1)
WHITE ELM IGE QN: <0.1 KUA/L (ref ?–0.1)
WHITE ELM IGE QN: <0.1 KUA/L (ref ?–0.1)
WHITE MULBERRY IGE QN: <0.1 KUA/L (ref ?–0.1)
WHITE OAK IGE QN: <0.1 KUA/L (ref ?–0.1)
WHITE OAK IGE QN: <0.1 KUA/L (ref ?–0.1)

## 2025-02-21 NOTE — PROGRESS NOTES
CBC Normal (white blood cells and red blood cells and platelets), iron levels and iron storage are normal.  Hormone levels look okay.  Sed rate which is non specific marker of inflammmation is slightly elevated. Recheck in 2 weeks.

## 2025-02-24 ENCOUNTER — APPOINTMENT (OUTPATIENT)
Dept: PHYSICAL THERAPY | Age: 50
End: 2025-02-24
Attending: ORTHOPAEDIC SURGERY
Payer: COMMERCIAL

## 2025-02-24 LAB
ESTRADIOL: 15.4 PG/ML
ESTRONE: 19 PG/ML

## 2025-02-24 NOTE — TELEPHONE ENCOUNTER
I called and spoke with patient  Reviewed info and gave phone numbers and details on where to call to schedule Surgery visit and Parathyroid Scan testing.   Patient also wanted to review lab work further and needed better understanding of when to go have these tests done.Some ordered back in December to do after 3/8 2025 and another test in about 2 weeks from last (sed rate)    All questions answered, patient with good understanding and no other needs at this time.     Will call if needs something or further questions/concerns.

## 2025-02-26 ENCOUNTER — APPOINTMENT (OUTPATIENT)
Dept: PHYSICAL THERAPY | Age: 50
End: 2025-02-26
Attending: ORTHOPAEDIC SURGERY
Payer: COMMERCIAL

## 2025-02-28 LAB
SEX HORM BIND GLOB: 37.6 NMOL/L
TESTOST % FREE+WEAK BND: 14.5 %
TESTOST FREE+WEAK BND: 1.8 NG/DL
TESTOSTERONE TOT /MS: 12.1 NG/DL

## 2025-03-04 ENCOUNTER — APPOINTMENT (OUTPATIENT)
Dept: PHYSICAL THERAPY | Age: 50
End: 2025-03-04
Attending: ORTHOPAEDIC SURGERY
Payer: COMMERCIAL

## 2025-03-06 ENCOUNTER — TELEPHONE (OUTPATIENT)
Dept: GASTROENTEROLOGY | Facility: CLINIC | Age: 50
End: 2025-03-06

## 2025-03-06 ENCOUNTER — OFFICE VISIT (OUTPATIENT)
Dept: GASTROENTEROLOGY | Facility: CLINIC | Age: 50
End: 2025-03-06
Payer: COMMERCIAL

## 2025-03-06 VITALS
SYSTOLIC BLOOD PRESSURE: 116 MMHG | WEIGHT: 233 LBS | BODY MASS INDEX: 34.51 KG/M2 | DIASTOLIC BLOOD PRESSURE: 66 MMHG | HEIGHT: 69 IN

## 2025-03-06 DIAGNOSIS — R79.89 ELEVATED LFTS: Primary | ICD-10-CM

## 2025-03-06 DIAGNOSIS — D50.9 IRON DEFICIENCY ANEMIA, UNSPECIFIED IRON DEFICIENCY ANEMIA TYPE: ICD-10-CM

## 2025-03-06 NOTE — H&P
[facility] - Gastroenterology                                                                                                               Reason for consult: Elevated LFTs    Requesting physician or provider: Karis Ruff MD    Chief Complaint   Patient presents with    Consult     Elevated liver enzymes;        HPI:   Karla Du is a 50 year old year-old female with h/o breast cancer here for the following:    Pt finished lupron and anastrozole October 2024.  Outside of these medications, pt wasn't taking anything.  Pt takes probiotics, folic acid, and vitamin D. Denies taking any herbal supplements, teas, or anything else.     LFTs were elevated 12/2024. Remainder of CBC and CMP were unremarkable.     Pt had hepatitis A as a child. No other h/o hepatitis. Denies family h/o liver disease.     Pt is on her health journey and pt has been intentionally been losing weight.   Pt was ~250 4/2024 and is currently 228. Pt is minimizing alcohol as well.      Maternal grandmother had CRC, unknown age (maybe 60s)    Pt has been on semaglutide in 10/2024 and has been tolerating it without issue.    Denies any other symptoms.      US liver with elastography 1/2025  Impression   CONCLUSION:  1. Hepatic steatosis versus diffuse hepatocellular disease.  2. Suboptimal elastography for technical reasons due to dense hepatic echotexture.  3. Cholelithiasis with gallbladder lumen filled with gallstones.  Negative Loving sign.         LFTs today:  AST 18  ALT 38 (H)    (H)       Latest Reference Range & Units 12/07/24 10:02   ALKALINE PHOSPHATASE 39 - 100 U/L 111 (H)   AST (SGOT) <34 U/L 36 (H)   ALT (SGPT) 10 - 49 U/L 63 (H)   Total Bilirubin 0.3 - 1.2 mg/dL 0.4   (H): Data is abnormally high      Prior endoscopies:  CLN in 2009 for family history     Soc:  -denies smoking  -denies heavy Etoh - rare  -no illicit drug  use    Wt Readings from Last 6 Encounters:   03/06/25 233 lb (105.7 kg)   02/20/25 230 lb 12.8 oz (104.7 kg)   01/24/25 234 lb (106.1 kg)   12/16/24 239 lb (108.4 kg)   12/04/24 245 lb (111.1 kg)   04/01/24 249 lb (112.9 kg)        History, Medications, Allergies, ROS:      Past Medical History:    Cancer (HCC)    Brreast Cancer    Viral conjunctivitis of both eyes      Past Surgical History:   Procedure Laterality Date    Biopsy of breast, needle core Left 04/19/2019    Colonoscopy  2009    Ir varicose vein endovenous laser ablation(cpt=36478) Bilateral 2019    Lasik Bilateral 2012    Mirena, iud  02/13/2019    Tonsillectomy  1984      Family Hx:   Family History   Problem Relation Age of Onset    Other (Other) Father         unsure if cancer.    Glaucoma Maternal Grandmother     Heart Disorder Maternal Grandmother     Colon Cancer Maternal Grandmother 65    Cancer Maternal Grandmother         colon cancer    Prostate Cancer Paternal Grandfather     Other (apnea) Brother     Diabetes Neg     Macular degeneration Neg       Social History:   Social History     Socioeconomic History    Marital status:     Number of children: 1   Occupational History    Occupation:    Tobacco Use    Smoking status: Never    Smokeless tobacco: Never   Vaping Use    Vaping status: Never Used   Substance and Sexual Activity    Alcohol use: Yes     Alcohol/week: 2.0 standard drinks of alcohol     Types: 2 Glasses of wine per week    Drug use: Never    Sexual activity: Yes     Partners: Male        Medications (Active prior to today's visit):  Current Outpatient Medications   Medication Sig Dispense Refill    semaglutide 8 MG/3ML Subcutaneous Solution Pen-injector Inject 2 mg into the skin once a week.      Cholecalciferol (VITAMIN D3) 10 MCG (400 UNIT) Oral Cap Take by mouth. (Patient not taking: Reported on 3/6/2025)      Lactobacillus (PROBIOTIC ACIDOPHILUS) Oral Cap Take by mouth. (Patient not taking: Reported on  3/6/2025)         Allergies:  Allergies[1]    ROS:   CONSTITUTIONAL:  negative for fevers, rigors  EYES:  negative for diplopia   RESPIRATORY:  negative for severe shortness of breath  CARDIOVASCULAR:  negative for crushing sub-sternal chest pain  GASTROINTESTINAL:  see HPI  GENITOURINARY:  negative for dysuria or gross hematuria  INTEGUMENT/BREAST:  SKIN:  negative for jaundice   ALLERGIC/IMMUNOLOGIC:  negative for hay fever  ENDOCRINE:  negative for cold intolerance and heat intolerance  MUSCULOSKELETAL:  negative for joint effusion/severe erythema  BEHAVIOR/PSYCH:  negative for psychotic behavior      PHYSICAL EXAM:   Blood pressure 116/66, height 5' 9\" (1.753 m), weight 233 lb (105.7 kg), not currently breastfeeding.  Body mass index is 34.41 kg/m².      GEN - Patient appears comfortable and in no acute discomfort  ENT - MMM  EYES - the sclera appears anicteric  CV - no edema  RESP -  No increased work of breathing  ABDOMEN - soft, non-tender exam in all quadrants without rigidity or guarding, non-distended, no abnormal bowel sounds noted, no masses are palpated  SKIN - No jaundice  NEURO - Alert and appropriate, and gross movements of extremities normal  PSYCH - normal affect, non-agitated    Labs/Imaging:     Patient's pertinent labs and imaging were reviewed and discussed with patient today.      .  ASSESSMENT/PLAN:   Karla Du is a 50 year old year-old female with h/o breast cancer here for the following:    Elevated LFTs - LFTs are coming down on the blood work she brought today. Recommend completing work-up for chronic liver disease with A1AT, ASMA, and QI. BMI is 34 and US showing e/o hepatic steatosis. MASH is the most likely etiology.  She has been losing weight steadily with Ozempic and lifestyle modifications. Recommend continuing these efforts and repeat LFTs in 4 months.     Iron deficiency - EGD and CLN are already scheduled in May. Most recent hgb is wnl.     CRC screening - CLN already  scheduled as above.     Recommend    - Keep appts for EGD and CLN with MAC   - Hold Ozempic for 1 week prior to the procedures    - Complete chronic liver disease work-up - A1AT, ASMA, QI    - Continue weight reduction efforts    - Follow up in 4 months or sooner    EGD and Colonoscopy consent: I have discussed the risks, benefits, and alternatives to colonoscopy and EGD with the patient/primary decision maker [who demonstrated understanding], including but not limited to the risks of bleeding, infection, pain, death, as well as the risks of anesthesia and perforation all leading to prolonged hospitalization, surgical intervention, or even death. I also mentioned the miss rate of EGD and colonoscopy of 5-10% in the best of all circumstances. The patient has agreed to sign an informed consent and elected to proceed with the procedures with possible intervention [i.e. polypectomy, stent placement, etc.] as indicated.      Orders This Visit:  Orders Placed This Encounter   Procedures    Actin (Smooth Muscle) Antibody    Alpha-1-antirypsin, serum    Immunoglobulin A/G/M, Quant       Meds This Visit:  Requested Prescriptions      No prescriptions requested or ordered in this encounter       Imaging & Referrals:  None         Felipa Sanchez MD          This note was partially prepared using Dragon Medical voice recognition dictation software. As a result, errors may occur. When identified, these errors have been corrected. While every attempt is made to correct errors during dictation, discrepancies may still exist.          [1] No Known Allergies

## 2025-03-06 NOTE — PATIENT INSTRUCTIONS
PLAN    - Have the other blood work done this weekend    - Please have the liver enzymes done in ~4 months and follow up with Dr. Sanchez     - Hold semaglutide for 1 week before the EGD and colonoscopy

## 2025-03-25 ENCOUNTER — LAB ENCOUNTER (OUTPATIENT)
Dept: LAB | Facility: HOSPITAL | Age: 50
End: 2025-03-25
Attending: INTERNAL MEDICINE
Payer: COMMERCIAL

## 2025-03-25 ENCOUNTER — HOSPITAL ENCOUNTER (OUTPATIENT)
Dept: NUCLEAR MEDICINE | Facility: HOSPITAL | Age: 50
Discharge: HOME | End: 2025-03-25
Attending: INTERNAL MEDICINE
Payer: COMMERCIAL

## 2025-03-25 DIAGNOSIS — E21.3 HYPERPARATHYROIDISM (HCC): ICD-10-CM

## 2025-03-25 DIAGNOSIS — R74.8 ACID PHOSPHATASE ELEVATED: Primary | ICD-10-CM

## 2025-03-25 DIAGNOSIS — E78.2 MIXED HYPERLIPIDEMIA: ICD-10-CM

## 2025-03-25 DIAGNOSIS — R70.0 ELEVATED SED RATE: ICD-10-CM

## 2025-03-25 DIAGNOSIS — R74.8 ELEVATED LIVER ENZYMES: ICD-10-CM

## 2025-03-25 LAB
ALBUMIN SERPL-MCNC: 4.1 G/DL (ref 3.2–4.8)
ALBUMIN/GLOB SERPL: 1.5 {RATIO} (ref 1–2)
ALP LIVER SERPL-CCNC: 116 U/L
ALT SERPL-CCNC: 111 U/L
ANION GAP SERPL CALC-SCNC: 7 MMOL/L (ref 0–18)
AST SERPL-CCNC: 107 U/L (ref ?–34)
BILIRUB SERPL-MCNC: 0.6 MG/DL (ref 0.3–1.2)
BUN BLD-MCNC: 14 MG/DL (ref 9–23)
BUN/CREAT SERPL: 16.5 (ref 10–20)
CALCIUM BLD-MCNC: 8.9 MG/DL (ref 8.7–10.4)
CHLORIDE SERPL-SCNC: 109 MMOL/L (ref 98–112)
CHOLEST SERPL-MCNC: 154 MG/DL (ref ?–200)
CO2 SERPL-SCNC: 26 MMOL/L (ref 21–32)
CREAT BLD-MCNC: 0.85 MG/DL
EGFRCR SERPLBLD CKD-EPI 2021: 83 ML/MIN/1.73M2 (ref 60–?)
ERYTHROCYTE [SEDIMENTATION RATE] IN BLOOD: 31 MM/HR
FASTING PATIENT LIPID ANSWER: YES
FASTING STATUS PATIENT QL REPORTED: YES
GLOBULIN PLAS-MCNC: 2.8 G/DL (ref 2–3.5)
GLUCOSE BLD-MCNC: 88 MG/DL (ref 70–99)
HDLC SERPL-MCNC: 42 MG/DL (ref 40–59)
LDLC SERPL CALC-MCNC: 96 MG/DL (ref ?–100)
NONHDLC SERPL-MCNC: 112 MG/DL (ref ?–130)
OSMOLALITY SERPL CALC.SUM OF ELEC: 294 MOSM/KG (ref 275–295)
POTASSIUM SERPL-SCNC: 3.7 MMOL/L (ref 3.5–5.1)
PROT SERPL-MCNC: 6.9 G/DL (ref 5.7–8.2)
PTH-INTACT SERPL-MCNC: 82.9 PG/ML (ref 18.5–88)
SODIUM SERPL-SCNC: 142 MMOL/L (ref 136–145)
TRIGL SERPL-MCNC: 87 MG/DL (ref 30–149)
VLDLC SERPL CALC-MCNC: 14 MG/DL (ref 0–30)

## 2025-03-25 PROCEDURE — 86038 ANTINUCLEAR ANTIBODIES: CPT

## 2025-03-25 PROCEDURE — 78071 PARATHYRD PLANAR W/WO SUBTRJ: CPT | Performed by: INTERNAL MEDICINE

## 2025-03-25 PROCEDURE — 85652 RBC SED RATE AUTOMATED: CPT

## 2025-03-25 PROCEDURE — 36415 COLL VENOUS BLD VENIPUNCTURE: CPT

## 2025-03-25 PROCEDURE — 80053 COMPREHEN METABOLIC PANEL: CPT

## 2025-03-25 PROCEDURE — 80061 LIPID PANEL: CPT

## 2025-03-25 PROCEDURE — 86225 DNA ANTIBODY NATIVE: CPT

## 2025-03-25 PROCEDURE — 83970 ASSAY OF PARATHORMONE: CPT

## 2025-03-25 PROCEDURE — 82525 ASSAY OF COPPER: CPT

## 2025-03-25 NOTE — PROGRESS NOTES
CMP Normal (electrolytes, sugar, kidney and liver functions), liver enzymes are more elevated than prior.  Awaiting similar blood work.  Lipid (choilesterol) is good,   Sed rate is slightly elevated but better than prior.  Awaiting some more blood work.  Parathyroid hormone levels are back to normal.

## 2025-03-25 NOTE — PROGRESS NOTES
CMP Normal (electrolytes, sugar, kidney and liver functions), the liver enzymes are more elevated than prior.  Parathyroid hormone levels are normal.  Lipid (choilesterol) is good,   Sed rate is slightly elevated but better than prior which is a nonspecific marker of inflammation.

## 2025-03-26 ENCOUNTER — PATIENT MESSAGE (OUTPATIENT)
Dept: INTERNAL MEDICINE CLINIC | Facility: CLINIC | Age: 50
End: 2025-03-26

## 2025-03-26 LAB
DSDNA IGG SERPL IA-ACNC: <0.6 IU/ML
ENA AB SER QL IA: <0.09 UG/L
ENA AB SER QL IA: NEGATIVE

## 2025-03-29 LAB — COPPER: 118 UG/DL

## 2025-03-31 NOTE — TELEPHONE ENCOUNTER
Please review liver elastography done in January.  There is there is also some other blood work that was ordered by GI that needs to be done from March 6 to 20, 2025.  Liver elastography showed fatty liver.  Weight loss will help that.  And low-fat diet.

## 2025-04-10 ENCOUNTER — PATIENT MESSAGE (OUTPATIENT)
Dept: INTERNAL MEDICINE CLINIC | Facility: CLINIC | Age: 50
End: 2025-04-10

## 2025-04-11 NOTE — TELEPHONE ENCOUNTER
Please note there are some other blood work that needs to be done.  From GI.  If all of that is negative then we will recheck the liver enzymes sometimes it takes a while for liver enzymes to go down with a low-fat diet and maintaining exercise and low-carb diet.

## 2025-05-09 ENCOUNTER — TELEPHONE (OUTPATIENT)
Dept: SURGERY | Facility: CLINIC | Age: 50
End: 2025-05-09

## 2025-05-09 ENCOUNTER — OFFICE VISIT (OUTPATIENT)
Dept: SURGERY | Facility: CLINIC | Age: 50
End: 2025-05-09
Payer: COMMERCIAL

## 2025-05-09 VITALS
HEIGHT: 67.8 IN | HEART RATE: 79 BPM | WEIGHT: 228.69 LBS | BODY MASS INDEX: 35.06 KG/M2 | SYSTOLIC BLOOD PRESSURE: 120 MMHG | OXYGEN SATURATION: 96 % | DIASTOLIC BLOOD PRESSURE: 70 MMHG

## 2025-05-09 DIAGNOSIS — Z51.81 ENCOUNTER FOR THERAPEUTIC DRUG MONITORING: Primary | ICD-10-CM

## 2025-05-09 DIAGNOSIS — K76.0 FATTY LIVER: ICD-10-CM

## 2025-05-09 DIAGNOSIS — E66.9 OBESITY (BMI 30-39.9): ICD-10-CM

## 2025-05-09 RX ORDER — TIRZEPATIDE 5 MG/.5ML
5 INJECTION, SOLUTION SUBCUTANEOUS WEEKLY
Qty: 2 ML | Refills: 3 | Status: SHIPPED | OUTPATIENT
Start: 2025-05-09

## 2025-05-09 RX ORDER — TIRZEPATIDE 5 MG/.5ML
5 INJECTION, SOLUTION SUBCUTANEOUS WEEKLY
Qty: 2 ML | Refills: 1 | Status: SHIPPED | OUTPATIENT
Start: 2025-05-09

## 2025-05-09 NOTE — TELEPHONE ENCOUNTER
Patient would like to change zepbound dose to 7.5 she was on the 5 mg and felt it was not working for her. Please advise thank you

## 2025-05-09 NOTE — PROGRESS NOTES
[facility] Consultation Note       Date of Consult:  2025    Patient:  Karla Du  :      1975  MRN:      [id number]    Referring Provider: Dr. Ruff     Chief Complaint:    Chief Complaint   Patient presents with    Consult     Wants to know her options, programs, medication   Patient of ozempic with weight loss program has been on it since October.  Has seen some weight loss since then but feels stuck with weight now    Weight Management    Obesity       SUBJECTIVE     History of Present Illness:  Karla Du has been referred to me for evaluation and treatment.       51 yo female.  Presents to clinic for assistance with weight loss/maintenance.     Reports she has been going to an o/s weight loss clinic since October.   She is currently taking compound semaglutide.    lbs.  lbs. (-24 lbs)  Current dose 2 mg, 40 units.    Hx breast CA s/p lumpectomy and anastrozole/lupron.     Patient is considering medications for weight loss.    Patient denies any history of eating disorder(s).    Patient is employed: [employer].  Patient lives with daughter.    Patient's goal weight: Under 200 lbs (last weighed 10 years ago)  Biggest weight loss in the past: 40 lbs  How weight loss was achieved: clean eating, exercising  Heaviest weight ever: 252 lbs   Previous use of medical weight loss medications: phentermine, topiramate (did not tolerate well)    Physical activity: 3-4 days/week strength training, cardio    Sleep: adequate hours/night    Sleep screening:       Past Medical History: Past Medical History[1]    OBJECTIVE     Vitals: /70 (BP Location: Right arm, Patient Position: Sitting, Cuff Size: large)   Pulse 79   Ht 5' 7.8\" (1.722 m)   Wt 228 lb 11.2 oz (103.7 kg)   SpO2 96%   BMI 34.98 kg/m²        Wt Readings from Last 6 Encounters:   25 228 lb 11.2 oz (103.7 kg)   25 233 lb (105.7 kg)   25 230 lb 12.8 oz (104.7 kg)   25 234 lb  (106.1 kg)   12/16/24 239 lb (108.4 kg)   12/04/24 245 lb (111.1 kg)        Patient Medications:  Current Medications[2]    Allergies:  Patient has no known allergies.     Comorbidities:      Social History:  Reviewed     Surgical History:  Past Surgical History[3]    Family History:  Family History[4]      Typical Dietary Intake:  Breakfast AM Snack Lunch PM Snack Dinner   Protein shake  Chicken sausage  Egg   None  Meal preps Sundays- Quinoa power vegetable, chicken bowl  None  Chickpea protein pasta, cottage cheese, vegetables, sauce      Aims for good protein rex currently  Describes poor eating habits majority of her life   After dinner behavior: -  Night eating: -   Portion sizes: - currently, + in the past   Binge: -  Emotional: -  Depression: -  Grazing: -  Sweet tooth: + in the past   Crunchy/salty: + in the past   Etoh: Rare   Good water intake   Soda Drinker: No    Sports Drinks:  No    Juice:  No      Number of restaurant or fast food meals/week:  rare meals/week    Nutritional Goals Reviewed and Discussed:     Eat 3-4 cups of fresh fruit or vegetables daily    Behavior Modifications Reviewed and Discussed:    Eat breakfast, Eat 3 meals per day, Plan meals in advance, Read nutrition labels, Drink 64oz of water per day, Maintain a daily food journal, Utilize portion control strategies to reduce calorie intake, Identify triggers for eating and manage cues, and Eat slowly and take 20 to 30 minutes to complete each meal      ROS:  Constitutional: negative  Respiratory: negative  Cardiovascular: negative  Gastrointestinal: negative  Integument/breast: negative  Hematologic/lymphatic: negative  Musculoskeletal:negative  Neurological: negative  Behavioral/Psych: negative  Endocrine: negative    Physical Exam:  General appearance: alert, appears stated age, cooperative and obese  Head: Normocephalic, without obvious abnormality, atraumatic  Neck: no adenopathy, no carotid bruit, no JVD, supple, symmetrical,  trachea midline and thyroid not enlarged, symmetric, no tenderness/mass/nodules  Lungs: clear to auscultation bilaterally  Heart: S1, S2 normal, no murmur, click, rub or gallop, regular rate and rhythm  Abdomen: soft, non-tender; bowel sounds normal; no masses,  no organomegaly and abdomen obese   Extremities: intact, no edema   Pulses: 2+ and symmetric  Skin: intact   Neurologic: Grossly normal      ASSESSMENT         Encounter Diagnosis(ses):   1. Encounter for therapeutic drug monitoring    2. Fatty liver    3. Obesity (BMI 30-39.9)        PLAN         Diagnoses and all orders for this visit:    Encounter for therapeutic drug monitoring    Fatty liver    Obesity (BMI 30-39.9)  -     Tirzepatide-Weight Management (ZEPBOUND) 5 MG/0.5ML Subcutaneous Solution; Inject 5 mg into the skin once a week.  -     Tirzepatide-Weight Management (ZEPBOUND) 5 MG/0.5ML Subcutaneous Solution Auto-injector; Inject 5 mg into the skin once a week.        OBESITY/WEIGHT GAIN:  Fatty Liver:    Recommended intensive lifestyle and behavioral modifications at this time for weight loss.    Educated patient on lifestyle modifications: Whole Food/Plant Strong/Low Glycemic Index diet, moderate alcohol consumption, reduced sodium intake to no more than 2,400 mg/day, and at least 150 minutes of moderate physical activity per week.   Avoid processed, poor quality carbohydrates, refined grains, flour, sugar.    Goals for next month:  1. Keep a food log..   2. Drink 64 ounces of non-caloric beverages per day. No fruit juices or regular soda.  3. Aim for 150 minutes moderate exercise per week.    4. Increase fruit and vegetable servings to 5-6 per day.    5. Improve sleep and stress.     Reviewed labs:  3/6/25- CBC in range.   Vitamin D in range.   Ferritin, iron, CBC in range.   3/25/25- Elevated LFTs. Cholesterol in range.     Discussed medication options for weight loss in detail with patient.     Reports she has been going to an o/s weight loss  clinic since October.   She is currently taking compound semaglutide.    lbs.  lbs. (-24 lbs)  Current dose 2 mg, 40 units.    Denies personal or family hx medullary thyroid CA, endocrine neoplasia syndrome, pancreatitis hx, suicidal ideation. No renal impairment, severe GI disease, diabetes, pancreatitis risks noted.    Reportedly does not have weight loss medication coverage.    Stop compound semaglutide.   Switch to Zepbound 5 mg weekly OOP vial.   Increase dose monthly as tolerated.  May switch back to compound semaglutide with Dr. Robles as needed.     SQ administration teaching provided to patient.   Discussed risks, benefits, and side effects of medication. Avoid pregnancy during use. Contraindications for medication discussed at length. Patient states understanding.     Healthy Plate Method.  Metabolic Endeavors Program.  AICR Lifestyle Modifications.     I spent 45 minutes preparing chart, obtaining/reviewing pertinent history, performing medically appropriate examination/evaluations, counseling/educating on assessment and plan, ordering and reviewing tests/medications as needed, referring/communicating with other health care professionals as needed, documenting clinical information, interpreting results, communicating results, and/or coordinating patient care.     RTC 6 weeks virtual.    CLARIBEL Gresham            [1]   Past Medical History:   Cancer (HCC)    Brreast Cancer    Viral conjunctivitis of both eyes   [2]   Current Outpatient Medications   Medication Sig Dispense Refill    Tirzepatide-Weight Management (ZEPBOUND) 5 MG/0.5ML Subcutaneous Solution Inject 5 mg into the skin once a week. 2 mL 3    Tirzepatide-Weight Management (ZEPBOUND) 5 MG/0.5ML Subcutaneous Solution Auto-injector Inject 5 mg into the skin once a week. 2 mL 1    semaglutide 8 MG/3ML Subcutaneous Solution Pen-injector Inject 2 mg into the skin once a week.      Cholecalciferol (VITAMIN D3) 10 MCG (400 UNIT) Oral Cap  Take by mouth. (Patient not taking: Reported on 3/6/2025)      Lactobacillus (PROBIOTIC ACIDOPHILUS) Oral Cap Take by mouth. (Patient not taking: Reported on 3/6/2025)     [3]   Past Surgical History:  Procedure Laterality Date    Biopsy of breast, needle core Left 04/19/2019    Colonoscopy  2009    Ir varicose vein endovenous laser ablation(cpt=36478) Bilateral 2019    Lasik Bilateral 2012    Mirena, iud  02/13/2019    Tonsillectomy  1984   [4]   Family History  Problem Relation Age of Onset    Other (Other) Father         unsure if cancer.    Glaucoma Maternal Grandmother     Heart Disorder Maternal Grandmother     Colon Cancer Maternal Grandmother 65    Cancer Maternal Grandmother         colon cancer    Prostate Cancer Paternal Grandfather     Other (apnea) Brother     Diabetes Neg     Macular degeneration Neg

## 2025-05-09 NOTE — PATIENT INSTRUCTIONS
Aim for:   5 fruits and vegetable servings each day off of the American [place] for Cancer Research list.     30 minute walk, 5-6 days/week as tolerated. Exercise in ways you love.     2-3 days of strength training per week.     The New American or Healthy Plate Methods.     Keep meals between 7 am and 7 pm.     Track food and beverage intake using My Fitness Pal or Lose It.     Protein: 85 grams/day.    Carbs:  grams/day.    Aim for 2 healthy fats/day.     Take 250-500 mg magnesium glycinate each evening to improve sleep as needed. Now, Doctor's Best, Pure Encapsulations, Life Extension are good brands.     Magnesium salt baths weekly. 1 cup epsom salt; 1/2 cup baking soda.    Write 3 things great things that happened and that you're grateful for each night before bed.    A diet filled with a variety of vegetables, fruits, whole grains, beans, and other plant foods helps lower risk for many cancers. These include: apples, asparagus, blueberries, broccoli/crucifers, brussels sprouts, carrots, cauliflower, cherries, coffee, cranberries, flaxseed, garlic, grapefruit, grapes, kale, pulses (beans, peas, lentils), raspberries, *soy, spinach, squash, strawberries, tea tomatoes, walnuts, whole grains (AICR, 2020).   Other recommendations:   I recommend a whole food, plant powered diet with low glycemic index:     Aim for 3 meals a day and 1-2 snacks as needed.    Aim for a protein + produce at each meal time.      Breakfast ideas:  1. Fruit and nuts/seeds.  2. Eggs scrambled with vegetables.  3. Oatmeal (stovetop), cinnamon, 2 tbsp flaxseed, berries, nuts.  4. Protein shake + fruit. (1 scoop with water/ice). Garden of Life Fit, Nutiva, Novoa, and Orgain are good brands.      Snacks ideas:  Raw vegetables and hummus, apples and peanut butter, nuts, seeds, fruit, pecans drizzled with organic honey.      Use the Healthy Plate method for lunch and dinner:  1/2 right side of plate non-starchy vegetables.  Bottom left  1/4 plate protein.  Top left 1/4 starch as desired.     Or Use the new American Plate Method for lunch and dinner:  1/3 animal protein/product  2/3 plants- fruits, vegetables, whole grains, legumes, nuts, seeds      Aim for a total of:  2 fruits a day (avocado, tomato, citrus/oranges, apples, berries)  1/2 cup or medium size    4 non-starchy vegetables (handful) (greens, peppers, onions, garlic, broccoli, cauliflower, etc.)    0-2 starches (oatmeal, sweet potato, carrots, brown rice, etc).    3 protein per day (fish, seafood, meat, or plants: salmon, nuts, seeds, shrimp, chicken, turkey, beans, lentils, chickpeas, etc).    2 healthy fats (avocado, avocado oil, olives, olive oil, salmon, nuts, seeds)    References     [employer] [place] [employer] (AICR).  (2020). AICR's foods that fight cancer. Retrieved from https://www.aicr.org/cancer-prevention/food-facts/

## 2025-06-05 DIAGNOSIS — E66.9 OBESITY (BMI 30-39.9): Primary | ICD-10-CM

## 2025-06-05 RX ORDER — TIRZEPATIDE 7.5 MG/.5ML
7.5 INJECTION, SOLUTION SUBCUTANEOUS WEEKLY
Qty: 2 ML | Refills: 3 | Status: SHIPPED | OUTPATIENT
Start: 2025-06-05

## 2025-07-03 ENCOUNTER — TELEPHONE (OUTPATIENT)
Facility: CLINIC | Age: 50
End: 2025-07-03

## 2025-07-03 NOTE — TELEPHONE ENCOUNTER
Patient outreach message received:    Patient outreach updated for patient to repeat liver enzymes in 4 months per Dr. Sanchez

## 2025-07-07 NOTE — TELEPHONE ENCOUNTER
I spoke to the patient    Patient is aware she is due to repeat liver enzymes    Patient verbalized understanding and has no further questions at this time

## 2025-07-09 NOTE — TELEPHONE ENCOUNTER
Patient was advice to return in 3 months  no opening until July please advice    Resume home lantus tonight    Be sure to keep your urology appointment tomorrow

## 2025-07-15 ENCOUNTER — PATIENT MESSAGE (OUTPATIENT)
Dept: INTERNAL MEDICINE CLINIC | Facility: CLINIC | Age: 50
End: 2025-07-15

## 2025-07-16 ENCOUNTER — NURSE TRIAGE (OUTPATIENT)
Dept: INTERNAL MEDICINE CLINIC | Facility: CLINIC | Age: 50
End: 2025-07-16

## 2025-07-16 NOTE — TELEPHONE ENCOUNTER
Action Requested: Summary for Provider     []  Critical Lab, Recommendations Needed  [x] Need Additional Advice  []   FYI    []   Need Orders  [] Need Medications Sent to Pharmacy  []  Other     SUMMARY: Per protocol, patient should be seen in the office within 3 days. No appointment available.     Dr. Ruff please advise if okay to use Res 24 for followup, or other recommendation.     Reason for call: Vaginal Problem  Onset: Past Week    Patient reports she has been spotting for the past week now, noticing it when wiping. States she has not had a menstrual period for 6 years now. Diagnosed with breast cancer 5 years ago and treatment stopped her period. States the last time her labs were drawn, it came back consistent with being postmenopausal so she thought she was done having periods.    She is concerned about the spotting and wants to get a better understanding about what is happening with her body. Care advice given per protocol. Patient verbalized understanding and agreed with plan of care.     Reason for Disposition   Bleeding or spotting after procedure (e.g., biopsy) or pelvic examination (e.g., pap smear) that lasts > 7 days    Protocols used: Vaginal Bleeding - Dctizlme-P-UG

## 2025-07-16 NOTE — TELEPHONE ENCOUNTER
She needs to see gyne ASAP and see Dr. Doshi or Dr. Atkins. they can do pelvic US in office an dother testing.

## 2025-07-16 NOTE — TELEPHONE ENCOUNTER
Patient was seen on 2/20/25.         Estrogen levels are lower.  Consistent with postmenopausal.  Testosterone level is okay.   Written by Karis Ruff MD on 3/2/2025  4:51 PM CST  Seen by patient Karla Du on 3/5/2025 12:49 PM    
No

## 2025-07-23 ENCOUNTER — TELEMEDICINE (OUTPATIENT)
Dept: SURGERY | Facility: CLINIC | Age: 50
End: 2025-07-23
Payer: COMMERCIAL

## 2025-07-23 VITALS — WEIGHT: 220 LBS | BODY MASS INDEX: 34 KG/M2

## 2025-07-23 DIAGNOSIS — Z51.81 ENCOUNTER FOR THERAPEUTIC DRUG MONITORING: Primary | ICD-10-CM

## 2025-07-23 DIAGNOSIS — K76.0 FATTY LIVER: ICD-10-CM

## 2025-07-23 DIAGNOSIS — E66.9 OBESITY (BMI 30-39.9): ICD-10-CM

## 2025-07-23 NOTE — PROGRESS NOTES
Virtual Video & Audio Check-In    Karla Du verbally consents to a Virtual Check-In visit on 25.  Patient has been referred to the [facility] website at [email or web address] to review the yearly Consent to Treat document.    Patient understands and accepts financial responsibility for any deductible, co-insurance and/or co-pays associated with this service.    Summary of topics discussed: Obesity/weight management, Lifestyle and behavior modifications, Medication management.      [facility]  [address]  [facility] [address]  Dept: [phone]       Patient:  Karla Du  :      1975  MRN:      [id number]    Chief Complaint:    Chief Complaint   Patient presents with    Follow - Up    Obesity    Weight Management       SUBJECTIVE     History of Present Illness:  Karla is being seen today for a follow-up for weight management.    Doing well on OOP Zepbound.  Mild constipation.     Initial HPI:  49 yo female.  Presents to clinic for assistance with weight loss/maintenance.     Reports she has been going to an o/s weight loss clinic since October.   She is currently taking compound semaglutide.    lbs.  lbs. (-24 lbs)  Current dose 2 mg, 40 units.    Hx breast CA s/p lumpectomy and anastrozole/lupron.     Patient is considering medications for weight loss.    Patient denies any history of eating disorder(s).    Patient is employed: [employer].  Patient lives with daughter.    Patient's goal weight: Under 200 lbs (last weighed 10 years ago)  Biggest weight loss in the past: 40 lbs  How weight loss was achieved: clean eating, exercising  Heaviest weight ever: 252 lbs   Previous use of medical weight loss medications: phentermine, topiramate (did not tolerate well)    Physical activity: 3-4 days/week strength training, cardio    Sleep: adequate hours/night    Sleep screening:     Past Medical History: Past Medical  History[1]     Comorbidities:      OBJECTIVE     Vitals: Wt 220 lb (99.8 kg)   BMI 33.65 kg/m²     Initial weight loss: -08   Total weight loss:  -08   Start weight: 228    Wt Readings from Last 6 Encounters:   07/23/25 220 lb (99.8 kg)   05/09/25 228 lb 11.2 oz (103.7 kg)   03/06/25 233 lb (105.7 kg)   02/20/25 230 lb 12.8 oz (104.7 kg)   01/24/25 234 lb (106.1 kg)   12/16/24 239 lb (108.4 kg)       Patient Medications:  Current Medications[2]  Allergies:  Patient has no known allergies.     Social History:  Reviewed     Surgical History:  Past Surgical History[3]  Family History:  Family History[4]    Initial Intake:  Aims for good protein inake currently  Describes poor eating habits majority of her life   After dinner behavior: -  Night eating: -   Portion sizes: - currently, + in the past   Binge: -  Emotional: -  Depression: -  Grazing: -  Sweet tooth: + in the past   Crunchy/salty: + in the past   Etoh: Rare   Good water intake   Soda Drinker: No                     Sports Drinks:  No                  Juice:  No                     Number of restaurant or fast food meals/week:  rare meals/week    Food Journal  Reviewed and Discussed:       Patient has a Food Journal?: yes meal preps   Patient is reading nutrition labels?  yes  Average Caloric Intake:     Average CHO Intake:   Is patient exercising? yes  Type of exercise? 3-4 classes/week- includes strength training & cardio     Eating Habits  Patient states the following:  Eats 3 meal(s) per day  Length of time it takes to consume a meal:    # of snacks per day:  Type of snacks:    Amount of soda consumption per day:    Amount of water (in ounces) per day:  adequate   Toughest challenge:  work travel     Meal preps   B: egg bites- eggs, cottage cheese, chicken sausage   L: sweet potato, ground beef, cottage cheese   D: The Najma's plate- vegetables, fruit, protein   1 treat day/meal    Nutritional Goals  Eat 3-4 cups of fresh fruits or vegetables  daily    Behavior Modifications Reviewed and Discussed  Eat breakfast, Eat 3 meals per day, Plan meals in advance, Read nutrition labels, Drink 64 oz of water per day, Maintain a daily food journal, Utlize portion control strategies to reduce calorie intake, Identify triggers for eating and manage cues, and Eat slowly and take 20 to 30 minutes to complete each meal    Exercise Goals Reviewed and Discussed    Aim for 150 minutes moderate level exercise weekly with 2-3 days strength training as tolerated     ROS:    Constitutional: negative  Respiratory: negative  Cardiovascular: negative  Gastrointestinal: negative  Integument/breast: negative  Hematologic/lymphatic: negative  Musculoskeletal:negative  Neurological: negative  Behavioral/Psych: negative  Endocrine: negative  All other systems were reviewed and are negative    Physical Exam:  General: alert, oriented x 3, cooperative, speaking in full sentences, appears stated age and cooperative, obese   Head: Normocephalic, without obvious abnormality, atraumatic  Neck: symmetrical, trachea midline   Lungs: No increased work of breathing   Extremities: extremities normal  Skin: Upper body skin color and texture appear intact       ASSESSMENT       Encounter Diagnosis(ses):   Encounter Diagnoses   Name Primary?    Encounter for therapeutic drug monitoring Yes    Obesity (BMI 30-39.9)     Fatty liver        PLAN         Diagnoses and all orders for this visit:    Encounter for therapeutic drug monitoring    Obesity (BMI 30-39.9)    Fatty liver      OBESITY/WEIGHT GAIN:  Fatty Liver:     Recommended patient continue intensive lifestyle and behavioral modifications at this time for weight loss.     Reviewed lifestyle modifications: Whole Food/Plant Strong/Low Glycemic Index diet, moderate alcohol consumption, reduced sodium intake to no more than 2,400 mg/day, and at least 150 minutes of moderate physical activity per week.   Avoid processed, poor quality carbohydrates,  refined grains, flour, sugar.     Goals for next month:  1. Keep a food log..   2. Drink 64 ounces of non-caloric beverages per day. No fruit juices or regular soda.  3. Aim for 150 minutes moderate exercise per week.    4. Increase fruit and vegetable servings to 5-6 per day.    5. Improve sleep and stress.      Reviewed labs:  3/6/25- CBC in range.   Vitamin D in range.   Ferritin, iron, CBC in range.   3/25/25- Elevated LFTs. Cholesterol in range.      Discussed medication options for weight loss in detail with patient.      Presented to clinic while taking compound semaglutide ordered by o/s provider.   Achieved weight loss of -24 lbs.  Current dose was 2 mg, 40 units.     Denies personal or family hx medullary thyroid CA, endocrine neoplasia syndrome, pancreatitis hx, suicidal ideation. No renal impairment, severe GI disease, diabetes, pancreatitis risks noted.     Stopped compound semaglutide.   Switched to Zepbound OOP vial- current dose 7.5 mg weekly.   Increase dose at least monthly as tolerated.  Mild constipation. Lifestyle/medication management discussed at length.      SQ administration teaching provided to patient.   Discussed risks, benefits, and side effects of medication. Avoid pregnancy during use. Contraindications for medication discussed at length. Patient states understanding.      Healthy Plate Method.  Metabolic Endeavors Program.  AICR Lifestyle Modifications.      RTC 2 months.      CLARIBEL Gresham             [1]   Past Medical History:   Cancer (HCC)    Brreast Cancer    Viral conjunctivitis of both eyes   [2]   Current Outpatient Medications   Medication Sig Dispense Refill    Tirzepatide-Weight Management (ZEPBOUND) 7.5 MG/0.5ML Subcutaneous Solution Inject 7.5 mg into the skin once a week. 2 mL 3    Tirzepatide-Weight Management (ZEPBOUND) 5 MG/0.5ML Subcutaneous Solution Inject 5 mg into the skin once a week. 2 mL 3    Cholecalciferol (VITAMIN D3) 10 MCG (400 UNIT) Oral Cap Take by  mouth. (Patient not taking: Reported on 3/6/2025)      Lactobacillus (PROBIOTIC ACIDOPHILUS) Oral Cap Take by mouth. (Patient not taking: Reported on 3/6/2025)     [3]   Past Surgical History:  Procedure Laterality Date    Biopsy of breast, needle core Left 04/19/2019    Colonoscopy  2009    Ir varicose vein endovenous laser ablation(cpt=36478) Bilateral 2019    Lasik Bilateral 2012    Mirena, iud  02/13/2019    Tonsillectomy  1984   [4]   Family History  Problem Relation Age of Onset    Other (Other) Father         unsure if cancer.    Glaucoma Maternal Grandmother     Heart Disorder Maternal Grandmother     Colon Cancer Maternal Grandmother 65    Cancer Maternal Grandmother         colon cancer    Prostate Cancer Paternal Grandfather     Other (apnea) Brother     Diabetes Neg     Macular degeneration Neg

## 2025-07-23 NOTE — PATIENT INSTRUCTIONS
For your constipation:    Make sure you are getting 64 oz of water daily.     Make sure to walk daily as much as possible. Regular movement keeps the bowels moving well.     Please concentrate on getting adequate fiber in your diet (25-35 grams is recommended per day), which is found in fruits, vegetables, whole grains, and legumes (lentils, beans).     You can try adding prunes, apples, and/or kiwi for fruit options as these often get the bowels moving and try to eat oatmeal in the morning with 2 tbs flaxseed, berries, and cinnamon a few times/week.     If you are still struggling after you try these lifestyle measures you may consider:    Miralax every 24 hours until a bowel movement is achieved. Miralax functions by drawing water into the colon, which helps hydrate and soften the stool, making it easier to pass. Take 17 grams of powder mixed in to 4-8 oz of water per serving. A bowel movement will usually be observed within a few days of use.     Magnesium citrate: Magnesium citrate typically causes a bowel movement within 30 minutes to 6 hours after consumption. The typical adult dose for constipation is 6.5 to 10 fluid ounces (fl oz) of the oral solution, not exceeding 10 fl oz in 24 hours. This is a great option if you are having multiple days in between bowel movements and abdominal pain is worsening. It's important to drink plenty of fluids when taking magnesium citrate to avoid dehydration.    Stool softener (colace) to soften stool as needed.    If no relief, take senokot (must be taken less than 2x/week) if all other methods fail. This will stimulate the bowel to contract.    Consider a daily magnesium glycinate supplement (200-500 mg daily) if indicated to keep the bowels regular thereafter. Good supplement brands are NOW, Pure Encapsulations, Desi, Life Extension, Garden of Life, and Doctor's Best.       Please reach out if you do not experience relief of symptoms in a reasonable timeframe.

## 2025-08-11 DIAGNOSIS — E66.9 OBESITY (BMI 30-39.9): Primary | ICD-10-CM

## 2025-08-11 RX ORDER — TIRZEPATIDE 10 MG/.5ML
10 INJECTION, SOLUTION SUBCUTANEOUS WEEKLY
Qty: 2 ML | Refills: 3 | Status: SHIPPED | OUTPATIENT
Start: 2025-08-11

## 2025-08-14 ENCOUNTER — PATIENT MESSAGE (OUTPATIENT)
Dept: INTERNAL MEDICINE CLINIC | Facility: CLINIC | Age: 50
End: 2025-08-14

## 2025-08-25 ENCOUNTER — TELEPHONE (OUTPATIENT)
Facility: CLINIC | Age: 50
End: 2025-08-25

## (undated) NOTE — LETTER
AUTHORIZATION FOR SURGICAL OPERATION OR OTHER PROCEDURE    1. I hereby authorize Dr. Comfort Cadet, and [address] [place] [address] staff assigned to my case to perform the following operation and/or procedure at the [address] [place] [address]:         Endometrial Biopsy    2. Relationship to Patient:           []  Parent    Responsible person                          []  Spouse  In case of minor or                    [] Other  _____________   Incompetent name:  __________________________________________________

## (undated) NOTE — LETTER
[address], [place]  Authorization for Invasive Procedures  1.  I hereby authorize Dr. Sheila Kamara , my physician and whomever may be designated as the doctor's assistant, to perform the following operation and/or procedure:  Stereot performed for the purposes of advancing medicine, science, and/or education, provided my identity is not revealed. If the procedure has been videotaped, the physician/surgeon will obtain the original videotape.  The hospital will not be responsible for stor My signature below affirms that prior to the time of the procedure, I have explained to the patient and/or her legal representative, the risks and benefits involved in the proposed treatment and any reasonable alternative to the proposed treatment.  I have

## (undated) NOTE — LETTER
3/12/2020          To Whom It May Concern:    Lakeshia Alvarez is currently under my medical care and may not return to work at this time. Please excuse Karla for 2 days. She may return to work on 03/16/2020. Activity is restricted as follows: none.

## (undated) NOTE — LETTER
No referring provider defined for this encounter. 04/25/19        Patient: Samantha Randolph   YOB: 1975   Date of Visit: 4/24/2019       Dear  Dr. Lambert De Jesus MD,      Thank you for referring Samantha Randolph to my practice.   Please find

## (undated) NOTE — LETTER
AUTHORIZATION FOR SURGICAL OPERATION OR OTHER PROCEDURE    1.  I hereby [facility]. William Duvall, [facility] assigned to my case to perform the following operation and/or procedure at [facility]:    _______IUD REMOVAL____ Patient signature:  ___________________________________________________             Relationship to Patient:           []  Parent    Responsible person                          []  Spouse  In case of minor or                    [] Other  _____________   In

## (undated) NOTE — MR AVS SNAPSHOT
[address] [facility] [address]  [phone]               Thank you for choosing us for your health care visit with Casandra Kwong MD.  We are glad to serve you and happy to provide you with this summary of your v Rocio Chavez MD   [address] [place] [address]   Phone:  [phone]   Fax:  [phone]    Diagnoses:  Blurring of vision   Order:  Ophthalmology - Internal    Sean Morales MD   [facility]   [address] [facility] [address]   Phon Travel to [facility].        Orders Placed This Encounter  POC Urinalysis, Automated Dip without microscopy ([facility] and [facility] ONLY) [59708]  TdaP (Boostrix/Adacel) Vaccine (> 7 Y)  Immunization Admin Counseling, 1st Component, 18 years and older    Meds This Visi URINE-COLOR Yellow Yellow    Multistix Lot# [id number] Numeric    Multistix Expiration Date 3/31/18 Date                  MyChart     Visit MyChart  You can access your MyChart to more actively manage your health care and view more details from this visit by

## (undated) NOTE — LETTER
11/3/2021          To Whom It May Concern:    Felicita Mejia is currently under my medical care and may not return to the office at this time. She can work from home in [address] until 11/10/21.    If you require additional information please contact our

## (undated) NOTE — LETTER
AUTHORIZATION FOR SURGICAL OPERATION OR OTHER PROCEDURE    1.  I hereby authorize Dr. Abhijeet Pichardo, and [facility] assigned to my case to perform the following operation and/or procedure [facility]:    _________IUD INSERTION________ Time:  ________ A. M.  P.M.        Patient Name:  ______________________________________________________  (please print)      Patient signature:  ___________________________________________________             Relationship to Patient:

## (undated) NOTE — LETTER
November 3, 2021    [address] [facility] Raven Duarte, [address] [address]     Patient: Galilea Mars   YOB: 1975   Date of Visit: 11/3/2021       Dear Dr. Raven Duarte MD:    Thank you for referring Michelle Horn to me for evaluatio degeneration Neg        Social History: Social History    Tobacco Use      Smoking status: Never Smoker      Smokeless tobacco: Never Used    Alcohol use:  Yes      Alcohol/week: 8.0 standard drinks      Types: 6 Cans of beer, 2 Glasses of wine per week flare  Deep and quiet- no cell or flare     Iris Normal Normal    Lens Clear Clear    Vitreous Clear Vitreous floaters          Fundus Exam       Right Left    Disc Good rim Good rim    C/D Ratio 0.4 0.4    Macula Normal Normal    Vessels Normal Normal

## (undated) NOTE — LETTER
4/6/2018          To Whom It May Concern:    Doc Huang is currently under my medical care and may not return to work at this time. Please excuse Karla for 3 days. She may return to work on 4-9-18. Activity is restricted as follows: none.     If y

## (undated) NOTE — MR AVS SNAPSHOT
After Visit Summary   2/17/2024    Karla Du   MRN: [id number]           Visit Information     Date & Time  2/17/2024  9:00 AM Provider  Karis Ruff MD [facility] Dept. Phone  [phone]      Your Vitals Were  Most recent update: 2/17/2024  9:14 AM    BP   125/77 (BP Location: Right arm, Patient Position: Sitting, Cuff Size: adult)          Pulse   61          Temp   98.4 °F (36.9 °C) (Oral)          Ht   69\"          Wt   247 lb 9.6 oz             BMI   36.56 kg/m²         Allergies as of 2/17/2024  Review status set to Review Complete on 2/17/2024   No Known Allergies     Your Current Medications        Dosage    amoxicillin 500 MG Oral Tab Take 1 tablet (500 mg total) by mouth 2 (two) times daily.    topiramate 25 MG Oral Tab Take 2 tablets (50 mg total) by mouth every evening.    folic acid 1 MG Oral Tab     Leuprolide Acetate, 3 Month, (LUPRON DEPOT, 3-MONTH, IM) Inject into the muscle.    anastrozole 1 MG Oral Tab tab TK 1 T PO ONE TIME D      Diagnoses for This Visit    Colon cancer screening   [779292]  -  Primary  Astigmatism with presbyopia, bilateral   [3331213]    Adult general medical examination   [089787]    Floater, vitreous, bilateral   [7255720]    High cholesterol   [747224]    Iron deficiency anemia due to chronic blood loss   [843702]    Malignant neoplasm of upper-outer quadrant of left breast in female, estrogen receptor positive   [0244177]    Meibomian gland dysfunction (MGD) of both eyes   [7771450]    Myopia of both eyes with astigmatism and presbyopia   [9301835]    Varicose veins of right lower extremity with inflammation   [900510]    Crackling sound in both ears   [4558959]    Pap smear for cervical cancer screening   [821874]    Weight loss   [273962]    Bilateral hand pain   [492879]             Follow-up    Return in about 3 months (around 5/17/2024), or if symptoms worsen or fail to improve.     We Ordered  the Following     Normal Orders This Visit    C-Reactive Protein [E] [8497195 CUSTOM]     Cyclic Citrullinate Pep. IGG [8301532 CUSTOM]     Gastro Referral - In Network [14547497 CUSTOM]     Prevnar 20 (PCV20) [38191] [94579 CPT(R)]     Rheumatoid Arthritis Factor [2001695 CUSTOM]     Sed Rate, Guan[place]itoren (Automated) [2005215 CUSTOM]     THINPREP PAP SMEAR ONLY [VVD0881 CUSTOM]     ThinPrep PAP Smear [E] [LJM0182 CUSTOM]     URINALYSIS, AUTO, W/O SCOPE [77499 CPT(R)]     Future Labs/Procedures Expected by Expires    C-Reactive Protein [E] [4818211 CUSTOM]  2/17/2024 (Approximate) 2/17/2025    Cyclic Citrullinate Pep. IGG [8529467 CUSTOM]  2/17/2024 (Approximate) 2/17/2025    XR HAND (2 VIEWS), LEFT (CPT=73120) [01372 CPT(R)]  2/17/2024 (Approximate) 1/29/2025    XR HAND (2 VIEWS), RIGHT (CPT=73120) [47973 CPT(R)]  2/17/2024 (Approximate) 1/29/2025    Rheumatoid Arthritis Factor [9153561 CUSTOM]  2/20/2024 2/17/2026    Sed Rate, Nery (Automated) [2005215 CUSTOM]  3/2/2024 (Approximate) 2/17/2026      Future Appointments        Provider Department    2/27/2024 5:30 PM [facility] XR [address] X-ray [facility]    2/27/2024 5:45 PM [facility] XR [address] X-ray [facility]    6/7/2024 12:00 PM Karis Ruff [facility]    2/20/2025 10:00 AM Karis Ruff [facility]      Follow-up Instructions    Return in about 3 months (around 5/17/2024), or if symptoms worsen or fail to improve.     Imaging Scheduling Instructions     Around February 17, 2024   Imaging:   XR HAND (2 VIEWS), LEFT (CPT=73120)    Instructions: To schedule a test at any [facility] call Central Scheduling at   [phone].      Around February 17, 2024   Imaging:   XR HAND (2 VIEWS), RIGHT (CPT=73120)    Instructions: To schedule a test at any [facility] call Central  Scheduling at   [phone].        Instructions    ASSESSMENT/PLAN:     Encounter Diagnoses   Name Primary?    Colon cancer screening FU GI colonoscopy.    Yes    Astigmatism with presbyopia, bilateral Stable. FU optho.        Adult general medical examination Check urine.        Floater, vitreous, bilateral Stable.        High cholesterol Check blood.        Iron deficiency anemia due to chronic blood loss Stable.        Malignant neoplasm of upper-o acute uter quadrant of left breast in female, estrogen receptor positive  (HCC) Check mammogram. Continue self breast exam every month.         Meibomian gland dysfunction (MGD) of both eyes Stable.        Myopia of both eyes with astigmatism and presbyopia Stable.        Varicose veins of right lower extremity with inflammation Stable.        Crackling sound in both ears Stable.        Pap smear for cervical cancer screening Check pap and HPV.        Weight loss DW pt. of options. Try topamax 25 mg at night. Can  increase in 2 week to 50 mg at night. Discussed with patient of side effects and use of these medications.        Hand pain. Check Xrays and blood.     Vaccine counseling. PCV 20 today. Recommend shingles vaccine.  There is 2 doses of the vaccine  by 2 months 6 months apart.  Check on insurance coverage on shingles vaccine.  May be covered better at the pharmacy.  Separate shingles vaccine from all of the vaccines by at least 1 to 2 weeks.  Discussed about side effects of vaccine.   Shingles (Herpes Zoster)     Talk to your healthcare provider about the shingles vaccine.   Shingles is also called herpes zoster. It's a painful skin rash caused by the herpes zoster virus. This is the same virus that causes chickenpox. After a person has chickenpox, the virus stays inactive in the nerve cells. Years later, the virus can become active again and travel along the nerve to the skin. Most people have shingles only once. But it's possible to have it more  than once.  Who is at risk for shingles?  Anyone who has ever had chickenpox can get shingles. But your risk is greater if you:  Are age 50 or older  Have an illness that weakens your immune system, such as HIV/AIDS  Have cancer, especially Hodgkin disease or lymphoma  Take medicines that weaken your immune system  What are the symptoms of shingles?  The first sign of shingles is often pain, burning, tingling, or itching on one part of your face or body. You may also feel as if you have the flu, with fever and chills.  A red rash with small blisters appears in a few days. The rash may look as follows:   The blisters can occur anywhere, but they’re most common on the back, chest, or belly (abdomen).  They usually appear on only one side of the body, spreading along the nerve pathway where the virus is reactivating.   The rash can also form around an eye, along one side of the face or neck, or in the mouth.  In a few people, often those with a weak immune system, shingles appear on more than one part of the body at once.  After a few days, the blisters become dry and form a crust. The crust falls off in days to weeks. The blisters generally don't leave scars. But they can in severe cases. Or if someone has a weak immune system.    How is shingles treated?  For most people, shingles heals on its own in a few days or weeks. But treatment is advised to help ease pain, speed healing, and reduce the risk of complications. Antiviral medicines are most often only prescribed if you are seen by a healthcare provider within the first 72 hours of having the rash. But antiviral medicines may be prescribed even after 72 hours if someone's immune system is weak. Or if the infection is extensive, severe, or isn't going away. To reduce symptoms:  Apply ice packs or cool compresses, or soak in a cool bath. To make an ice pack, put ice cubes in a plastic bag that seals at the top. Wrap the bag in a clean, thin towel or cloth. Never put  ice or an ice pack directly on the skin.  Use calamine lotion to calm itchy skin.  Ask your provider about over-the-counter pain relievers. If your pain is severe, your provider may prescribe stronger pain medicines.  What are possible complications of shingles?  Shingles often goes away with no lasting effects. But some people have complications during or after the infection comes out:  Postherpetic neuralgia. This is the most common complication. It's more likely as people age, especially after age 60. It' is nerve pain at the place where the rash used to be. It can range from mild to severe. It can last for only a few days, or for months or even years after you have had shingles. Antiviral medicines given during the first 72 hours of the rash can reduce the chance of postherpetic neuralgia. Other medicines can be prescribed to help ease the pain and improve quality of life.  Bacterial infection. Shingles blisters may get infected with bacteria. Depending on the severity of the infection, topical, oral or IV (intravenous) antibiotic medicine is used to treat the infection.  Eye problems. If you have shingles on the face, see your healthcare provider right away. Shingles can cause serious problems with vision, and even blindness.  In very rare cases, shingles can also lead to pneumonia, hearing problems, brain inflammation, or even death.   When to get medical care  Call your healthcare provider if you have any of these:  New symptoms or symptoms that don’t go away with treatment  A rash or blisters near your eye  More drainage, fever, or rash after treatment  Severe pain that doesn’t go away  How can shingles be prevented?  You can only get shingles if you have had chickenpox in the past. Someone who never had chickenpox can get the virus from you. But instead of have shingles, the person may get chickenpox. Until your blisters form scabs, don't have any contact with others, especially the following:  Pregnant  women who have never had chickenpox or the vaccine  Babies who were born early (premature) or who had low weight at birth  People with weak immune systems (such as people getting chemotherapy for cancer, people who have had organ transplants, or people with HIV infections), particularly if they have never had chicken pox.  The shingles vaccine  Two shingles vaccines are available to help prevent shingles or make it less painful:  Zoster vaccine live (ZVL)  Recombinant zoster vaccine (RZV). This is a newer vaccine that has been available since 2017.  You should get the shingles vaccine if you are healthy and age 50 or older, even if you've had shingles in the past. Two shots of the RZV vaccine are recommended. You should get the second RZV shot 2 to 6 months after the first. The vaccine makes it less likely that you will develop shingles. If you do develop shingles, your symptoms will likely be milder than if you hadn’t been vaccinated. RZV is also advised even if you had the older shingles vaccine in the past. That's because the RZV vaccine works better and protects you from shingles longer.  Talk with your healthcare provider about the best time to get vaccinated. Ask which vaccine is best for you based on your age and health conditions.  [place] last reviewed this educational content on 6/1/2019  © 2449-7991 [employer]. All rights reserved. This information is not intended as a substitute for professional medical care. Always follow your healthcare professional's instructions.        Text SUPPORT1 to 08256 to learn if you may be eligible for financial support with your medication(s).    Msg & Data Rates May Apply. Msg freq varies. Terms apply. Text HELP for help. Text STOP to end.   Zoster Vaccine, Recombinant injection  Brand Name: SHINGRIX  What is this medicine?  ZOSTER VACCINE (ZOS ter vak SEEN) is used to prevent shingles in adults 50 years old and over. This vaccine is not used to treat  shingles or nerve pain from shingles.  How should I use this medicine?  This vaccine is for injection in a muscle. It is given by a health care professional.  Talk to your pediatrician regarding the use of this medicine in children. This medicine is not approved for use in children.  What side effects may I notice from receiving this medicine?  Side effects that you should report to your doctor or health care professional as soon as possible:  allergic reactions like skin rash, itching or hives, swelling of the face, lips, or tongue  breathing problems  Side effects that usually do not require medical attention (report these to your doctor or health care professional if they continue or are bothersome):  chills  headache  fever  nausea, vomiting  redness, warmth, pain, swelling or itching at site where injected  tiredness  What may interact with this medicine?    medicines that suppress your immune system  medicines to treat cancer  steroid medicines like prednisone or cortisone  What if I miss a dose?  Keep appointments for follow-up (booster) doses as directed. It is important not to miss your dose. Call your doctor or health care professional if you are unable to keep an appointment.  Where should I keep my medicine?  This vaccine is only given in a clinic, pharmacy, doctor's office, or other health care setting and will not be stored at home.  What should I tell my health care provider before I take this medicine?  They need to know if you have any of these conditions:  blood disorders or disease  cancer like leukemia or lymphoma  immune system problems or therapy  an unusual or allergic reaction to vaccines, other medications, foods, dyes, or preservatives  pregnant or trying to get pregnant  breast-feeding  What should I watch for while using this medicine?  Visit your doctor for regular check ups.  This vaccine, like all vaccines, may not fully protect everyone.  NOTE:This sheet is a summary. It may not cover  all possible information. If you have questions about this medicine, talk to your doctor, pharmacist, or health care provider. Copyright© 2020 [employer]         Orders Placed This Encounter   Procedures    Rheumatoid Arthritis Factor    Sed Rate, [place]gren (Automated)    Cyclic Citrullinate Pep. IGG    C-Reactive Protein [E]    URINALYSIS, AUTO, W/O SCOPE    Prevnar 20 (PCV20) [24436]    ThinPrep PAP Smear [E]       Meds This Visit:  Requested Prescriptions     Signed Prescriptions Disp Refills    topiramate 25 MG Oral Tab 60 tablet 3     Sig: Take 2 tablets (50 mg total) by mouth every evening.       Imaging & Referrals:  GASTRO - INTERNAL  PCV20 VACCINE FOR INTRAMUSCULAR USE  XR HAND (2 VIEWS), RIGHT (CPT=73120)  XR HAND (2 VIEWS), LEFT (CPT=73120)      RTC 3 months for FU weight loss.                      Did you know that [facility] primary care physicians now offer Video Visits through [employer] for adult patients for a variety of conditions such as allergies, back pain and cold symptoms? Skip the drive and waiting room and online chat with a doctor face-to-face using your web-cam enabled computer or mobile device wherever you are. Video Visits cost $50 and can be paid hassle-free using a credit, debit, or health savings card.  Not active on [employer]? Ask us how to get signed up today!          If you receive a survey from Victorina Browning, please take a few minutes to complete it and provide feedback. We strive to deliver the best patient experience and are looking for ways to make improvements. Your feedback will help us do so. For more information on [employer] [employer], please visit [email or web address]           No text in SmartText           No text in SmartText

## (undated) NOTE — MR AVS SNAPSHOT
[facility]  [address] [facility] [address] [phone]               Thank you for choosing us for your health care visit with Kendall Vargas MD.  We are glad to serve you and happy to provide you with this summary of your Commonly known as:  ZITHROMAX           clotrimazole-betamethasone 1-0.05 % Crea   Use to affected area BID and 1 cm beyond area for 4 weeks.    Commonly known as:  LOTRISONE           Montelukast Sodium 10 MG Tabs   Take 1 tablet (10 mg total) by mouth nig Start activities slowly and build up over time Do what you like   Get your heart pumping – brisk walking, biking, swimming Even 10 minute increments are effective and add up over the week   2 ½ hours per week – spread out over time Use a tee to keep you

## (undated) NOTE — LETTER
4/1/2024          To Whom It May Concern:    Karla Du is currently under my medical care and due to her current medical condition she is unable to drive so it will be in the patient best intereset if she is allowed to work from home from 04/01/2024-04/05/2024 and return to office on 04/08/2024.    If you require additional information please contact our office.        Sincerely,    Gregg Poe MD          Document generated by:  Gregg Poe MD

## (undated) NOTE — LETTER
[address], [place], [place]     AUTHORIZATION FOR SURGICAL OPERATION OR PROCEDURE    I hereby authorize                                    , my Physician(s) and whomever may be designated as the doctor's Assistant, to 4. I consent to the photographing of procedure(s) to be performed for the purposes of advancing medicine, science and/or education, provided my identity is not revealed.  If the procedure has been videotaped, the physician/surgeon will obtain the original v (Witness signature)                                                                                                  (Date)                                (Time)  STATEMENT OF PHYSICIAN My signature below affirms that prior to the time of the procedure;  I

## (undated) NOTE — LETTER
November 16, 2017    [address] [facility] [place] [address]     Patient: Doc Huang   YOB: 1975   Date of Visit: 11/16/2017       Dear Dr. [place]fort [place] MD:    Thank you for referring Hermann Sandifer to me for evaluation.  H Positive for: Eyes    Negative for: Constitutional, Gastrointestinal, Neurological, Skin, Genitourinary, Musculoskeletal, HENT, Endocrine, Cardiovascular, Respiratory, Psychiatric, Allergic/Imm, Heme/Lymph    Last edited by Ellen King on 11/16/2017  9:1 RX written for progressives, but patient can try +1.50 over the counter for reading                 ASSESSMENT/PLAN:     Diagnoses and Plan:     Myopia of both eyes with astigmatism and presbyopia  Recommend glasses; patient can try progressives, or she w

## (undated) NOTE — LETTER
[address], [place]  Authorization for Surgical Operation or Procedure          1.  I hereby authorize Laureano Guzman , my physician and the assistant, to perform the following operation and/or procedure:  ULTRASOUND GUIDED LEFT Ela Dorman performed for the purposes of advancing medicine, science, and/or education, provided my identity is not revealed. If the procedure has been videotaped, the physician/surgeon will obtain the original videotape.  The hospital will not be responsible for stor alternative to the proposed treatment. I have also explained the risks and benefits involved in the refusal of the proposed treatment and have answered the patient's questions.  If I have a significant financial interest in a co-management agreement or a si

## (undated) NOTE — LETTER
AUTHORIZATION FOR SURGICAL OPERATION OR OTHER PROCEDURE    1. I hereby authorize Dr. Mckeon/DOLORES Panda, and [facility] staff assigned to my case to perform the following operation and/or procedure at the [facility] site:    _______________Cortisone injection right knee___________________________      _______________________________________________________________________________________________    2.  My physician has explained the nature and purpose of the operation or other procedure, possible alternative methods of treatment, the risks involved, and the possibility of complication to me.  I acknowledge that no guarantee has been made as to the result that may be obtained.  3.  I recognize that, during the course of this operation, or other procedure, unforseen conditions may necessitate additional or different procedure than those listed above.  I, therefore, further authorize and request that the above named physician, his/her physician assistants or designees perform such procedures as are, in his/her professional opinion, necessary and desirable.  4.  Any tissue or organs removed in the operation or other procedure may be disposed of by and at the discretion of the [facility] and [facility].  5.  I understand that in the event of a medical emergency, I will be transported by local paramedics to [facility] or other hospital emergency department.  6.  I certify that I have read and fully understand the above consent to operation and/or other procedure.    7.  I acknowledge that my physician has explained sedation/analgesia administration to me including the risks and benefits.  I consent to the administration of sedation/analgesia as may be necessary or desirable in the judgement of my physician.    Witness signature: ___________________________________________________ Date:  ______/______/_____                    Time:  ________ MCKENNA TAPIA        Patient Name:  ______________________________________________________  (please print)      Patient signature:  ___________________________________________________             Relationship to Patient:           []  Parent    Responsible person                          []  Spouse  In case of minor or                    [] Other  _____________   Incompetent name:  __________________________________________________                               (please print)      _____________      Responsible person  In case of minor or  Incompetent signature:  _______________________________________________    Statement of Physician  My signature below affirms that prior to the time of the procedure, I have explained to the patient and/or his/her guardian, the risks and benefits involved in the proposed treatment and any reasonable alternative to the proposed treatment.  I have also explained the risks and benefits involved in the refusal of the proposed treatment and have answered the patient's questions.                        Date:  ______/______/_______  Provider                      Signature:  __________________________________________________________       Time:  ___________ A.M    P.M.

## (undated) NOTE — Clinical Note
Rosalinda, I met with Karla in clinic today for weight loss/management. I have recommended intensive lifestyle/behavioral modifications for weight loss. In addition, I have recommended she switch to Zepbound vial for weight management, complete our Metabolic Endeavors Program, and follow up routinely for ongoing support.  Please let me know if you have any questions or concerns. Thank you very much for referring your patient to our clinic.   Take good care, CLARIBEL Croft